# Patient Record
Sex: FEMALE | Race: WHITE | NOT HISPANIC OR LATINO | Employment: UNEMPLOYED | URBAN - METROPOLITAN AREA
[De-identification: names, ages, dates, MRNs, and addresses within clinical notes are randomized per-mention and may not be internally consistent; named-entity substitution may affect disease eponyms.]

---

## 2023-03-06 DIAGNOSIS — R91.1 PULMONARY NODULE: Primary | ICD-10-CM

## 2023-03-06 DIAGNOSIS — F17.210 CIGARETTE NICOTINE DEPENDENCE WITHOUT COMPLICATION: ICD-10-CM

## 2023-03-06 PROBLEM — F41.9 ANXIETY: Status: ACTIVE | Noted: 2023-03-06

## 2023-03-06 PROBLEM — M16.12 OSTEOARTHRITIS OF LEFT HIP: Status: ACTIVE | Noted: 2023-03-06

## 2023-03-06 PROBLEM — K58.0 IRRITABLE BOWEL SYNDROME WITH DIARRHEA: Status: ACTIVE | Noted: 2023-03-06

## 2023-03-06 PROBLEM — I10 HTN (HYPERTENSION): Status: ACTIVE | Noted: 2023-03-06

## 2023-03-06 PROBLEM — G43.909 MIGRAINE HEADACHE: Status: ACTIVE | Noted: 2023-03-06

## 2023-03-06 PROBLEM — F32.A DEPRESSION: Status: ACTIVE | Noted: 2023-03-06

## 2023-03-06 PROBLEM — E78.5 HYPERLIPIDEMIA: Status: ACTIVE | Noted: 2023-03-06

## 2023-03-06 PROBLEM — M16.11 OSTEOARTHRITIS OF RIGHT HIP: Status: ACTIVE | Noted: 2023-03-06

## 2023-03-06 PROBLEM — G62.9 PERIPHERAL NEUROPATHY: Status: ACTIVE | Noted: 2023-03-06

## 2023-03-06 PROBLEM — Z96.642 STATUS POST LEFT HIP REPLACEMENT: Status: ACTIVE | Noted: 2023-03-06

## 2023-03-06 PROBLEM — A60.00 GENITAL HERPES: Status: ACTIVE | Noted: 2023-03-06

## 2023-03-06 PROBLEM — E55.9 MILD VITAMIN D DEFICIENCY: Status: ACTIVE | Noted: 2023-03-06

## 2023-03-06 PROBLEM — M54.50 LOW BACK PAIN: Status: ACTIVE | Noted: 2023-03-06

## 2023-04-02 DIAGNOSIS — I10 PRIMARY HYPERTENSION: Primary | ICD-10-CM

## 2023-04-02 DIAGNOSIS — E55.9 MILD VITAMIN D DEFICIENCY: ICD-10-CM

## 2023-04-03 RX ORDER — RIFAXIMIN 550 MG/1
1 TABLET ORAL
COMMUNITY
Start: 2023-01-06 | End: 2023-10-30 | Stop reason: SDUPTHER

## 2023-04-03 RX ORDER — ERYTHROMYCIN 5 MG/G
OINTMENT OPHTHALMIC
COMMUNITY
Start: 2022-08-03 | End: 2023-11-15 | Stop reason: WASHOUT

## 2023-04-03 RX ORDER — HYOSCYAMINE SULFATE 0.125 MG
TABLET ORAL 4 TIMES DAILY PRN
COMMUNITY
End: 2023-11-15 | Stop reason: WASHOUT

## 2023-04-03 RX ORDER — ACETAMINOPHEN 500 MG
1000 TABLET ORAL EVERY 6 HOURS PRN
COMMUNITY
Start: 2016-03-31

## 2023-04-03 RX ORDER — ATORVASTATIN CALCIUM 80 MG/1
1 TABLET, FILM COATED ORAL DAILY
COMMUNITY
Start: 2017-09-29 | End: 2024-01-16

## 2023-04-03 RX ORDER — PROPRANOLOL HYDROCHLORIDE 120 MG/1
1 CAPSULE, EXTENDED RELEASE ORAL DAILY
COMMUNITY
Start: 2023-02-08 | End: 2023-05-08 | Stop reason: SDUPTHER

## 2023-04-03 RX ORDER — TRAMADOL HYDROCHLORIDE 50 MG/1
1 TABLET ORAL
COMMUNITY
Start: 2023-03-23 | End: 2023-10-02 | Stop reason: ALTCHOICE

## 2023-04-03 RX ORDER — LISINOPRIL AND HYDROCHLOROTHIAZIDE 12.5; 2 MG/1; MG/1
TABLET ORAL
Qty: 30 TABLET | Refills: 0 | Status: SHIPPED | OUTPATIENT
Start: 2023-04-03 | End: 2023-05-07

## 2023-04-03 RX ORDER — IBUPROFEN 800 MG/1
TABLET ORAL
COMMUNITY
Start: 2022-10-27 | End: 2023-10-16 | Stop reason: ALTCHOICE

## 2023-04-03 RX ORDER — NICOTINE 11MG/24HR
PATCH, TRANSDERMAL 24 HOURS TRANSDERMAL
Qty: 30 CAPSULE | Refills: 0 | Status: SHIPPED | OUTPATIENT
Start: 2023-04-03 | End: 2023-05-16

## 2023-04-03 RX ORDER — DICLOFENAC SODIUM 1 MG/ML
SOLUTION/ DROPS OPHTHALMIC
COMMUNITY
Start: 2023-01-23 | End: 2023-11-15 | Stop reason: WASHOUT

## 2023-04-03 RX ORDER — NICOTINE 11MG/24HR
50 PATCH, TRANSDERMAL 24 HOURS TRANSDERMAL DAILY
COMMUNITY
End: 2023-05-06 | Stop reason: SDUPTHER

## 2023-04-03 RX ORDER — RIZATRIPTAN BENZOATE 10 MG/1
TABLET ORAL
COMMUNITY
End: 2023-08-17

## 2023-04-03 RX ORDER — OMEPRAZOLE 40 MG/1
1 CAPSULE, DELAYED RELEASE ORAL DAILY
COMMUNITY
Start: 2023-01-08 | End: 2023-11-15 | Stop reason: WASHOUT

## 2023-04-03 RX ORDER — OXYCODONE AND ACETAMINOPHEN 5; 325 MG/1; MG/1
TABLET ORAL
COMMUNITY
Start: 2023-01-23 | End: 2023-10-02 | Stop reason: ALTCHOICE

## 2023-04-03 RX ORDER — TOPIRAMATE 25 MG/1
25 TABLET ORAL 2 TIMES DAILY
COMMUNITY

## 2023-04-03 RX ORDER — HYDROCODONE BITARTRATE AND ACETAMINOPHEN 5; 325 MG/1; MG/1
TABLET ORAL
COMMUNITY
Start: 2022-06-17 | End: 2023-10-02 | Stop reason: ALTCHOICE

## 2023-04-03 RX ORDER — LISINOPRIL AND HYDROCHLOROTHIAZIDE 12.5; 2 MG/1; MG/1
1 TABLET ORAL DAILY
COMMUNITY
End: 2023-05-07 | Stop reason: SDUPTHER

## 2023-04-03 RX ORDER — VALACYCLOVIR HYDROCHLORIDE 500 MG/1
500 TABLET, FILM COATED ORAL DAILY
COMMUNITY
End: 2023-06-12

## 2023-04-03 RX ORDER — CHLORHEXIDINE GLUCONATE ORAL RINSE 1.2 MG/ML
SOLUTION DENTAL
COMMUNITY
Start: 2023-01-20 | End: 2023-11-15 | Stop reason: WASHOUT

## 2023-04-03 RX ORDER — METRONIDAZOLE 500 MG/1
TABLET ORAL
COMMUNITY
Start: 2022-11-15 | End: 2023-11-15 | Stop reason: WASHOUT

## 2023-04-03 RX ORDER — AMITRIPTYLINE HYDROCHLORIDE 50 MG/1
50 TABLET, FILM COATED ORAL NIGHTLY
COMMUNITY
End: 2023-06-20

## 2023-04-03 RX ORDER — AMLODIPINE BESYLATE 5 MG/1
5 TABLET ORAL DAILY
COMMUNITY
End: 2024-01-18 | Stop reason: ALTCHOICE

## 2023-05-06 DIAGNOSIS — I10 PRIMARY HYPERTENSION: ICD-10-CM

## 2023-05-07 RX ORDER — LISINOPRIL AND HYDROCHLOROTHIAZIDE 12.5; 2 MG/1; MG/1
TABLET ORAL
Qty: 30 TABLET | Refills: 0 | Status: SHIPPED | OUTPATIENT
Start: 2023-05-07 | End: 2023-05-08 | Stop reason: SDUPTHER

## 2023-05-08 DIAGNOSIS — I10 PRIMARY HYPERTENSION: ICD-10-CM

## 2023-05-08 DIAGNOSIS — Z12.31 ENCOUNTER FOR SCREENING MAMMOGRAM FOR MALIGNANT NEOPLASM OF BREAST: Primary | ICD-10-CM

## 2023-05-08 RX ORDER — LISINOPRIL AND HYDROCHLOROTHIAZIDE 12.5; 2 MG/1; MG/1
1 TABLET ORAL DAILY
Qty: 90 TABLET | Refills: 3 | Status: SHIPPED | OUTPATIENT
Start: 2023-05-08 | End: 2024-05-02

## 2023-05-08 RX ORDER — PROPRANOLOL HYDROCHLORIDE 120 MG/1
120 CAPSULE, EXTENDED RELEASE ORAL DAILY
Qty: 90 CAPSULE | Refills: 3 | Status: SHIPPED | OUTPATIENT
Start: 2023-05-08 | End: 2024-04-25

## 2023-05-15 DIAGNOSIS — E55.9 MILD VITAMIN D DEFICIENCY: ICD-10-CM

## 2023-05-16 RX ORDER — ACETAMINOPHEN 500 MG
TABLET ORAL
Qty: 30 CAPSULE | Refills: 0 | Status: SHIPPED | OUTPATIENT
Start: 2023-05-16 | End: 2023-06-28

## 2023-06-10 DIAGNOSIS — A60.04 HERPES SIMPLEX VULVOVAGINITIS: Primary | ICD-10-CM

## 2023-06-12 RX ORDER — VALACYCLOVIR HYDROCHLORIDE 500 MG/1
TABLET, FILM COATED ORAL
Qty: 30 TABLET | Refills: 0 | Status: SHIPPED | OUTPATIENT
Start: 2023-06-12 | End: 2023-07-14

## 2023-06-19 DIAGNOSIS — G43.709 CHRONIC MIGRAINE WITHOUT AURA WITHOUT STATUS MIGRAINOSUS, NOT INTRACTABLE: Primary | ICD-10-CM

## 2023-06-20 RX ORDER — AMITRIPTYLINE HYDROCHLORIDE 50 MG/1
50 TABLET, FILM COATED ORAL NIGHTLY
Qty: 90 TABLET | Refills: 0 | Status: SHIPPED | OUTPATIENT
Start: 2023-06-20 | End: 2023-09-05

## 2023-06-20 RX ORDER — IBUPROFEN 600 MG/1
600 TABLET ORAL EVERY 6 HOURS PRN
COMMUNITY
Start: 2023-05-28 | End: 2023-10-16 | Stop reason: ALTCHOICE

## 2023-06-28 DIAGNOSIS — E55.9 MILD VITAMIN D DEFICIENCY: ICD-10-CM

## 2023-06-28 RX ORDER — ACETAMINOPHEN 500 MG
TABLET ORAL
Qty: 90 CAPSULE | Refills: 3 | Status: SHIPPED | OUTPATIENT
Start: 2023-06-28

## 2023-07-03 ENCOUNTER — TELEPHONE (OUTPATIENT)
Dept: PRIMARY CARE | Facility: CLINIC | Age: 58
End: 2023-07-03
Payer: COMMERCIAL

## 2023-07-03 NOTE — TELEPHONE ENCOUNTER
Results and recommendation left on pts vm and advised to call the office if she has any questions.

## 2023-07-03 NOTE — TELEPHONE ENCOUNTER
----- Message from Lise Jeff PA-C sent at 7/3/2023  3:03 PM EDT -----  Please call her with mammogram results.  It is negative.  Her breast tissue is dense which may hide masses and I would recommend her to have MRI breast which she is self-pay.  It is $250 out-of-pocket.  Let me know if she is interested

## 2023-07-13 DIAGNOSIS — A60.04 HERPES SIMPLEX VULVOVAGINITIS: ICD-10-CM

## 2023-07-14 RX ORDER — VALACYCLOVIR HYDROCHLORIDE 500 MG/1
TABLET, FILM COATED ORAL
Qty: 30 TABLET | Refills: 0 | Status: SHIPPED | OUTPATIENT
Start: 2023-07-14 | End: 2023-08-14

## 2023-08-14 DIAGNOSIS — A60.04 HERPES SIMPLEX VULVOVAGINITIS: ICD-10-CM

## 2023-08-14 RX ORDER — VALACYCLOVIR HYDROCHLORIDE 500 MG/1
TABLET, FILM COATED ORAL
Qty: 30 TABLET | Refills: 0 | Status: SHIPPED | OUTPATIENT
Start: 2023-08-14 | End: 2023-09-07

## 2023-08-16 DIAGNOSIS — G43.701 CHRONIC MIGRAINE WITHOUT AURA WITH STATUS MIGRAINOSUS, NOT INTRACTABLE: Primary | ICD-10-CM

## 2023-08-17 RX ORDER — RIZATRIPTAN BENZOATE 10 MG/1
TABLET ORAL
Qty: 30 TABLET | Refills: 0 | Status: SHIPPED | OUTPATIENT
Start: 2023-08-17 | End: 2023-12-05

## 2023-09-04 DIAGNOSIS — G43.709 CHRONIC MIGRAINE WITHOUT AURA WITHOUT STATUS MIGRAINOSUS, NOT INTRACTABLE: ICD-10-CM

## 2023-09-05 RX ORDER — AMITRIPTYLINE HYDROCHLORIDE 50 MG/1
50 TABLET, FILM COATED ORAL NIGHTLY
Qty: 90 TABLET | Refills: 1 | Status: SHIPPED | OUTPATIENT
Start: 2023-09-05 | End: 2024-03-05

## 2023-09-06 DIAGNOSIS — A60.04 HERPES SIMPLEX VULVOVAGINITIS: ICD-10-CM

## 2023-09-07 ENCOUNTER — HOSPITAL ENCOUNTER (OUTPATIENT)
Dept: DATA CONVERSION | Facility: HOSPITAL | Age: 58
End: 2023-09-07
Attending: ORTHOPAEDIC SURGERY | Admitting: ORTHOPAEDIC SURGERY
Payer: COMMERCIAL

## 2023-09-07 DIAGNOSIS — M18.0 BILATERAL PRIMARY OSTEOARTHRITIS OF FIRST CARPOMETACARPAL JOINTS: ICD-10-CM

## 2023-09-07 DIAGNOSIS — M19.049 PRIMARY OSTEOARTHRITIS, UNSPECIFIED HAND: ICD-10-CM

## 2023-09-07 RX ORDER — VALACYCLOVIR HYDROCHLORIDE 500 MG/1
TABLET, FILM COATED ORAL
Qty: 30 TABLET | Refills: 11 | Status: SHIPPED | OUTPATIENT
Start: 2023-09-07

## 2023-09-21 PROBLEM — L91.8 CUTANEOUS SKIN TAGS: Status: ACTIVE | Noted: 2023-09-21
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
## 2023-09-21 PROBLEM — E11.42 DIABETIC PERIPHERAL NEUROPATHY (MULTI): Status: ACTIVE | Noted: 2023-09-21

## 2023-09-21 PROBLEM — L81.1 MELASMA: Status: ACTIVE | Noted: 2023-09-21

## 2023-09-21 PROBLEM — M77.8 LEFT WRIST TENDINITIS: Status: ACTIVE | Noted: 2023-09-21

## 2023-09-21 PROBLEM — R09.89 VASOMOTOR PHENOMENON: Status: ACTIVE | Noted: 2023-09-21

## 2023-09-21 PROBLEM — M54.41 CHRONIC RIGHT-SIDED LOW BACK PAIN WITH RIGHT-SIDED SCIATICA: Status: ACTIVE | Noted: 2023-09-21

## 2023-09-21 PROBLEM — G89.29 CHRONIC RIGHT-SIDED LOW BACK PAIN WITH RIGHT-SIDED SCIATICA: Status: ACTIVE | Noted: 2023-09-21

## 2023-09-21 PROBLEM — M19.011 ARTHRITIS OF SHOULDER REGION, RIGHT: Status: ACTIVE | Noted: 2023-09-21

## 2023-09-21 PROBLEM — M51.36 DDD (DEGENERATIVE DISC DISEASE), LUMBAR: Status: ACTIVE | Noted: 2023-09-21

## 2023-09-21 PROBLEM — D22.60 MELANOCYTIC NEVI OF UNSPECIFIED UPPER LIMB, INCLUDING SHOULDER: Status: ACTIVE | Noted: 2021-09-29

## 2023-09-21 PROBLEM — D22.70 MELANOCYTIC NEVI OF UNSPECIFIED LOWER LIMB, INCLUDING HIP: Status: ACTIVE | Noted: 2021-09-29

## 2023-09-21 PROBLEM — M16.9 DEGENERATIVE JOINT DISEASE (DJD) OF HIP: Status: ACTIVE | Noted: 2023-09-21

## 2023-09-21 PROBLEM — R20.2 PARESTHESIA: Status: ACTIVE | Noted: 2023-09-21

## 2023-09-21 PROBLEM — H25.10 NUCLEAR SENILE CATARACT: Status: ACTIVE | Noted: 2023-09-21

## 2023-09-21 PROBLEM — M25.859 FEMORAL ACETABULAR IMPINGEMENT: Status: ACTIVE | Noted: 2023-09-21

## 2023-09-21 PROBLEM — I73.9 PAD (PERIPHERAL ARTERY DISEASE) (CMS-HCC): Status: ACTIVE | Noted: 2023-09-21

## 2023-09-21 PROBLEM — S43.431A TEAR OF RIGHT GLENOID LABRUM: Status: ACTIVE | Noted: 2023-09-21

## 2023-09-21 PROBLEM — L81.1 CHLOASMA: Status: ACTIVE | Noted: 2021-09-29

## 2023-09-21 PROBLEM — M54.31 RIGHT SIDED SCIATICA: Status: ACTIVE | Noted: 2023-09-21

## 2023-09-21 PROBLEM — M19.049 ARTHRITIS OF CARPOMETACARPAL JOINT: Status: ACTIVE | Noted: 2023-09-21

## 2023-09-21 PROBLEM — L81.4 OTHER MELANIN HYPERPIGMENTATION: Status: ACTIVE | Noted: 2021-09-29

## 2023-09-21 PROBLEM — I73.00 RAYNAUD'S DISEASE WITHOUT GANGRENE: Status: ACTIVE | Noted: 2023-09-21

## 2023-09-21 PROBLEM — K21.9 GERD (GASTROESOPHAGEAL REFLUX DISEASE): Status: ACTIVE | Noted: 2023-09-21

## 2023-09-21 PROBLEM — H04.129 TEAR FILM INSUFFICIENCY: Status: ACTIVE | Noted: 2023-09-21

## 2023-09-21 PROBLEM — E83.52 HYPERCALCEMIA: Status: ACTIVE | Noted: 2023-09-21

## 2023-09-21 PROBLEM — M25.50 ARTHRALGIA: Status: ACTIVE | Noted: 2023-09-21

## 2023-09-21 PROBLEM — M25.50 POLYARTHRALGIA: Status: ACTIVE | Noted: 2023-09-21

## 2023-09-21 PROBLEM — L91.8 OTHER HYPERTROPHIC DISORDERS OF THE SKIN: Status: ACTIVE | Noted: 2021-09-29

## 2023-09-21 PROBLEM — R92.8 ABNORMAL MAMMOGRAM: Status: ACTIVE | Noted: 2023-09-21

## 2023-09-21 PROBLEM — M67.911 DYSFUNCTION OF RIGHT ROTATOR CUFF: Status: ACTIVE | Noted: 2023-09-21

## 2023-09-21 PROBLEM — D22.5 MELANOCYTIC NEVI OF TRUNK: Status: ACTIVE | Noted: 2021-09-29

## 2023-09-21 PROBLEM — M62.838 MUSCLE SPASMS OF BOTH LOWER EXTREMITIES: Status: ACTIVE | Noted: 2023-09-21

## 2023-09-21 PROBLEM — M70.61 TROCHANTERIC BURSITIS OF RIGHT HIP: Status: ACTIVE | Noted: 2023-09-21

## 2023-09-21 PROBLEM — R79.89 ABNORMAL TSH: Status: ACTIVE | Noted: 2023-09-21

## 2023-09-21 PROBLEM — M46.1 SACROILIITIS (CMS-HCC): Status: ACTIVE | Noted: 2023-09-21

## 2023-09-21 PROBLEM — H52.31 ANISOMETROPIA: Status: ACTIVE | Noted: 2023-09-21

## 2023-09-21 PROBLEM — H40.003 PREGLAUCOMA, UNSPECIFIED, BILATERAL: Status: ACTIVE | Noted: 2023-09-21

## 2023-09-21 PROBLEM — R26.2 DIFFICULTY WALKING: Status: ACTIVE | Noted: 2023-09-21

## 2023-09-21 PROBLEM — H61.23 BILATERAL IMPACTED CERUMEN: Status: ACTIVE | Noted: 2023-09-21

## 2023-09-21 PROBLEM — D18.01 HEMANGIOMA OF SKIN AND SUBCUTANEOUS TISSUE: Status: ACTIVE | Noted: 2021-09-29

## 2023-09-21 PROBLEM — M54.16 LUMBAR RADICULITIS: Status: ACTIVE | Noted: 2023-09-21

## 2023-09-21 PROBLEM — B35.1 ONYCHOMYCOSIS OF TOENAIL: Status: ACTIVE | Noted: 2023-09-21

## 2023-09-21 PROBLEM — M20.5X1 HALLUX LIMITUS, RIGHT: Status: ACTIVE | Noted: 2023-09-21

## 2023-09-21 PROBLEM — N93.9 ABNORMAL VAGINAL BLEEDING: Status: ACTIVE | Noted: 2023-09-21

## 2023-09-21 PROBLEM — M51.369 DDD (DEGENERATIVE DISC DISEASE), LUMBAR: Status: ACTIVE | Noted: 2023-09-21

## 2023-09-21 PROBLEM — G60.9 IDIOPATHIC PERIPHERAL NEUROPATHY: Status: ACTIVE | Noted: 2023-09-21

## 2023-09-21 PROBLEM — L82.0 INFLAMED SEBORRHEIC KERATOSIS: Status: ACTIVE | Noted: 2021-09-29

## 2023-09-21 PROBLEM — H52.7 DISORDER OF REFRACTION: Status: ACTIVE | Noted: 2023-09-21

## 2023-09-21 PROBLEM — M16.0 OSTEOARTHRITIS, HIP, BILATERAL: Status: ACTIVE | Noted: 2023-09-21

## 2023-09-21 PROBLEM — L21.9 SEBORRHEIC DERMATITIS: Status: ACTIVE | Noted: 2023-09-21

## 2023-09-21 PROBLEM — M75.21 BICEPS TENDINITIS OF RIGHT SHOULDER: Status: ACTIVE | Noted: 2023-09-21

## 2023-09-21 PROBLEM — S29.019A STRAIN OF THORACIC REGION: Status: ACTIVE | Noted: 2023-09-21

## 2023-09-21 RX ORDER — TRETINOIN 0.5 MG/G
CREAM TOPICAL
COMMUNITY
Start: 2021-04-27 | End: 2023-11-15 | Stop reason: WASHOUT

## 2023-09-21 RX ORDER — TRETINOIN 1 MG/G
CREAM TOPICAL
COMMUNITY
Start: 2021-06-30 | End: 2023-11-15 | Stop reason: WASHOUT

## 2023-09-21 RX ORDER — AZELAIC ACID 0.15 G/G
1 GEL TOPICAL
COMMUNITY
Start: 2020-12-16 | End: 2023-11-15 | Stop reason: WASHOUT

## 2023-09-22 DIAGNOSIS — F32.5 MAJOR DEPRESSIVE DISORDER WITH SINGLE EPISODE, IN FULL REMISSION (CMS-HCC): Primary | ICD-10-CM

## 2023-09-22 RX ORDER — DOCUSATE SODIUM 100 MG/1
100 CAPSULE, LIQUID FILLED ORAL 2 TIMES DAILY PRN
COMMUNITY
Start: 2023-09-07 | End: 2023-11-15 | Stop reason: WASHOUT

## 2023-09-25 DIAGNOSIS — L65.9 HAIR LOSS: Primary | ICD-10-CM

## 2023-09-25 RX ORDER — CITALOPRAM 40 MG/1
40 TABLET, FILM COATED ORAL DAILY
Qty: 30 TABLET | Refills: 0 | Status: SHIPPED | OUTPATIENT
Start: 2023-09-25 | End: 2023-10-27

## 2023-09-29 VITALS — WEIGHT: 145.94 LBS | BODY MASS INDEX: 26.86 KG/M2 | HEIGHT: 62 IN

## 2023-09-30 NOTE — H&P
History of Present Illness:   Pregnant/Lactating:  ·  Are You Pregnant unable to answer   ·  Order Pregnancy Test order urine test   ·  Are You Currently Breastfeeding unable to answer     History Present Illness:  Reason for surgery: R CMC OA   HPI:    Pt with longstanding R CMC OA refractory to conservative management.     Allergies:        Allergies:  ·  predniSONE : Facial Swelling  ·  gabapentin : Psychosis  ·  Cymbalta : Psychosis, Other  ·  indomethacin : Unknown, Other  ·  sulfa  drugs: Rash    Home Medication Review:   Home Medications Reviewed: yes     Impression/Procedure:   ·  Impression and Planned Procedure: R CMC OA; plan for R CMC arthroplasty       ERAS (Enhanced Recovery After Surgery):  ·  ERAS Patient: no       Physical Exam Narrative:  ·  Physical Exam:    - Constitutional: No acute distress, cooperative  - Eyes: EOM grossly intact  - Head/Neck: Trachea midline  - Respiratory/Thorax: NWOB  - Cardiovascular: RRR on peripheral palpation  - Gastrointestinal: Nondistended  - Psychological: Appropriate mood/behavior  - Skin: Warm and dry. Additional findings in musculoskeletal evaluation  - Musculoskeletal: moves extremities     Physical Exam by System:    Respiratory/Thorax: - Respiratory/Thorax: NWOB   Cardiovascular: - Cardiovascular: RRR on peripheral  palpation     Consent:   COVID-19 Consent:  ·  COVID-19 Risk Consent Surgeon has reviewed key risks related to the risk of sharon COVID-19 and if they contract COVID-19 what the risks are.     Attestation:   Note Completion:  I am a:  Resident/Fellow   Attending Attestation I saw and evaluated the patient.  I personally obtained the key and critical portions of the history and physical exam or was physically present for key and  critical portions performed by the resident/fellow. I reviewed the resident/fellow?s documentation and discussed the patient with the resident/fellow.  I agree with the resident/fellow?s medical decision making as  documented in the note.     I personally evaluated the patient on 07-Sep-2023         Electronic Signatures:  Raghav Elizabeth (Resident))  (Signed 07-Sep-2023 07:58)   Authored: History of Present Illness, Allergies, Home  Medication Review, Impression/Procedure, ERAS, Physical Exam, Consent, Note Completion  Jurgen Munguia)  (Signed 07-Sep-2023 16:13)   Authored: Note Completion   Co-Signer: History of Present Illness, Allergies, Home Medication Review, Impression/Procedure, ERAS, Physical Exam, Consent, Note Completion      Last Updated: 07-Sep-2023 16:13 by Jurgen Munguia)

## 2023-10-01 NOTE — OP NOTE
PROCEDURE DETAILS    Preoperative Diagnosis:  1.  Right thumb carpometacarpal joint osteoarthritis  2.  Right scaphotrapeziotrapezoidal joint osteoarthritis   3.  Left thumb carpometacarpal joint osteoarthritis    Postoperative Diagnosis:  Same    Surgeon: Jurgen Munguia MD  Resident/Fellow/Other Assistant: Raghav Elizabeth MD PGY2    Procedure:  1. Right thumb carpometacarpal joint arthroplasty  2. Right flexor carpi radialis tendon harvest and transfer  3. Right partial trapezoidectomy  4. Left thumb carpometacarpal joint corticosteroid injection  Anesthesia: General LMA; Axillary nerve block  Estimated Blood Loss: 1cc  Blood Replaced: None  Findings: R thumb CMC and STT osteoarthritis.   Specimens(s) Collected: no,     Complications: None immediate  Drains and/or Catheters: None  Implants: None  Tourniquet Times: 56 minutes at 250mmHg  Patient Returned To/Condition: PACU in good condition                                Attestation:   Note Completion:  Attending Attestation I was present for the entire procedure    I am a: Resident/Fellow         Electronic Signatures:  Raghav Elizabeth (Resident))  (Signed 07-Sep-2023 14:31)   Authored: Post-Operative Note, Chart Review, Note Completion  Jurgen Munguia)  (Signed 07-Sep-2023 16:14)   Authored: Note Completion   Co-Signer: Post-Operative Note, Chart Review, Note Completion      Last Updated: 07-Sep-2023 16:14 by Jurgen Munguia)

## 2023-10-02 ENCOUNTER — TELEPHONE (OUTPATIENT)
Dept: PRIMARY CARE | Facility: CLINIC | Age: 58
End: 2023-10-02

## 2023-10-02 DIAGNOSIS — M19.049 CMC ARTHRITIS: Primary | ICD-10-CM

## 2023-10-02 DIAGNOSIS — N30.00 ACUTE CYSTITIS WITHOUT HEMATURIA: Primary | ICD-10-CM

## 2023-10-02 RX ORDER — CIPROFLOXACIN 500 MG/1
500 TABLET ORAL 2 TIMES DAILY
Qty: 10 TABLET | Refills: 0 | Status: SHIPPED | OUTPATIENT
Start: 2023-10-02 | End: 2023-10-07

## 2023-10-02 RX ORDER — OXYCODONE AND ACETAMINOPHEN 5; 325 MG/1; MG/1
1 TABLET ORAL EVERY 6 HOURS PRN
Qty: 28 TABLET | Refills: 0 | Status: SHIPPED | OUTPATIENT
Start: 2023-10-02 | End: 2023-10-09 | Stop reason: SDUPTHER

## 2023-10-09 RX ORDER — OXYCODONE AND ACETAMINOPHEN 5; 325 MG/1; MG/1
1 TABLET ORAL EVERY 6 HOURS PRN
Qty: 28 TABLET | Refills: 0 | Status: SHIPPED | OUTPATIENT
Start: 2023-10-09 | End: 2023-10-16

## 2023-10-16 ENCOUNTER — OFFICE VISIT (OUTPATIENT)
Dept: ORTHOPEDIC SURGERY | Facility: CLINIC | Age: 58
End: 2023-10-16
Payer: COMMERCIAL

## 2023-10-16 DIAGNOSIS — M19.049 CMC ARTHRITIS: Primary | ICD-10-CM

## 2023-10-16 PROCEDURE — L3924 HFO WITHOUT JOINTS PRE OTS: HCPCS | Performed by: ORTHOPAEDIC SURGERY

## 2023-10-16 RX ORDER — OXYCODONE AND ACETAMINOPHEN 5; 325 MG/1; MG/1
1 TABLET ORAL EVERY 6 HOURS PRN
Qty: 28 TABLET | Refills: 0 | Status: SHIPPED | OUTPATIENT
Start: 2023-10-16 | End: 2023-10-23

## 2023-10-16 RX ORDER — IBUPROFEN 600 MG/1
600 TABLET ORAL 3 TIMES DAILY
Qty: 90 TABLET | Refills: 0 | Status: SHIPPED | OUTPATIENT
Start: 2023-10-16 | End: 2023-11-15

## 2023-10-16 NOTE — PROGRESS NOTES
Date of surgery: 09/07/23          Surgery(s) performed: Right thumb CMC joint arthroplasty     Patient presents today for her second post-operative visit, performed on 09/07/23.     She bumped her hand getting out of shower over the weekend been more painful since.       Exam findings: Well healed surgical incision. Minimal swelling. Tenderness to palpation over the thumb CMC joint. Hypersensitivity over thumb metacarpal    Impression: Right thumb CMC joint arthritis    Plan: She was given a comfort cool splint to transition into. We provided her with a refill for more pain medication and ibuprofen. She was given a referral to therapy as well. She will return to see us for a repeat clinincal exam in 6 weeks.      Patient was prescribed a Comfort Cool for CMC arthritis. The patient has weakness, instability and/or deformity of their right thumb which requires stabilization from this orthosis to improve their function.      Verbal and written instructions for the use, wear schedule, cleaning and application of this item were given.  Patient was instructed that should the brace result in increased pain, decreased sensation, increased swelling, or an overall worsening of their medical condition, to please contact our office immediately.     Orthotic management and training was provided for skin care, modifications due to healing tissues, edema changes, interruption in skin integrity, and safety precautions with the orthosis.        Followup in 6 weeks.    Jurgen Munguia MD          [facility] [employer]     Department of Orthopaedic Surgery     Chief of Hand and Upper Extremity Surgery     [facility]      Scribe Attestation  By signing my name below, Shanta COBURN , Scribe   attest that this documentation has been prepared under the direction and in the presence of Jurgen Munguia MD.

## 2023-10-20 ENCOUNTER — EVALUATION (OUTPATIENT)
Dept: OCCUPATIONAL THERAPY | Facility: CLINIC | Age: 58
End: 2023-10-20
Payer: COMMERCIAL

## 2023-10-20 DIAGNOSIS — M25.531 WRIST PAIN, ACUTE, RIGHT: Primary | ICD-10-CM

## 2023-10-20 PROCEDURE — 97112 NEUROMUSCULAR REEDUCATION: CPT | Mod: GO | Performed by: OCCUPATIONAL THERAPIST

## 2023-10-20 PROCEDURE — 97165 OT EVAL LOW COMPLEX 30 MIN: CPT | Mod: GO | Performed by: OCCUPATIONAL THERAPIST

## 2023-10-20 NOTE — LETTER
October 20, 2023     Patient: Nereida Conway   YOB: 1965   Date of Visit: 10/20/2023       To Whom It May Concern:    It is my medical opinion that Nereida Conway {Work release (duty restriction):67518}.    If you have any questions or concerns, please don't hesitate to call.         Sincerely,        Jovanna Epstein OT    CC: No Recipients

## 2023-10-20 NOTE — PROGRESS NOTES
"  Occupational Therapy  Occupational Therapy Orthopedic Evaluation    Patient Name: Nereida Conway  MRN: [id number]  Today's Date: 10/20/2023  Time Calculation  Start Time: 0935  Stop Time: 1015  Time Calculation (min): 40 min    Insurance:  Visit number: Evaluation  Authorization info: required  Insurance Type: [employer]  Authorization certification  -  Start: 10/20/23  End: 12/1/23    Current Problem  1. Wrist pain, acute, right          Referred by: Dr. Munguia  Referred for: R thumb CMC LRTI  DOS: 9-7-23    Fall risk:  Precautions:     Medical History Form: Reviewed (scanned into chart)    Subjective:   Subjective        Chief Complaint: pain and decreased ROM of R wrist    Hand Dominance: Right    Pain:   3-6/10   Location: R radial wrist snuff box  Description: aching, dull, sharp, radiating  Aggravating Factors:  Active use  Relieving Factors:  rest, inactivity, immobilization    Previous Interventions/Treatments: None    Prior Level of Function (PLOF)  Patient previously independent with all ADLs/IADLs    ADL/IADL impairments  Bathing, Dressing, Hygiene/Grooming, Hair care, Sleep, Home mgt, Meal prep, and Work, and gardening    Patients Living Environment: Reviewed and no concern  Lives with: boyfriend  Primary Language: English  Patient's Goal(s) for Therapy: \"gain muscle back in my arm and get my thumb strength back and pull up my pants without thumb pain\"    Red Flags: Do you have any of the following? No  Fever/chills, unexplained weight changes, dizziness/fainting, unexplained change in bowel or bladder functions, unexplained malaise or muscle weakness, night pain/sweats, numbness or tingling      Objective   RIGHT HAND AROM (Degrees)   MCP PIP DIP DPC   Thumb 0/35      AROM  0/20    AROM    Thumb RABD 35         AROM      Thumb PABD 35         AROM        Outcome Measures:         QUICK DASH: 65.91%      Treatments:  Exercise Sets/Reps Comments   Thumb AROM all POM [id number] 5x/ day   Thumb opposition to SF P1 " 3x20 rps 5x/ day                                       Treatment Today  Neuro re-ed:  - AROM tendon gliding of all digits to re-ed proprioception and promote desensitization to dorsal thumb and wrist surgical site x 10 min    EDUCATION: Home exercise program, plan of care, activity modifications, joint protection, pain management, and injury pathology       Assessment:   Pt is a 58-year-old female who underwent surgical intervention on her right wrist and thumb on 9/7/2023 and is presenting today for evaluation with complaints of decreased strength, decreased ROM, increased pain.  As a result of these impairments pt is having difficulty with upper body/lower body bathing and dressing tasks, difficulty with hair care tasks, is experiencing loss of sleep, is having difficulty with home management and meal prep tasks, and is unable to return to work.  Without skilled intervention patient is at risk for further loss of ROM, loss of strength, reduced ADL/IADL function, and is at risk for requiring caregiver assistance for self care completion.  Patient to benefit from skilled services to address the impairments found in order to return to independent prior level of function.      Plan:     Planned Interventions include: therapeutic exercise, self-care home management, manual therapy, therapeutic activities, , neuromuscular coordination, vasopneumatic, dry needling, and orthosis fabrication.  Frequency: 1 x Week  Duration: 6 Weeks    Goals: Set and discussed today    Active       OT Goals       OT Goal 1       Start:  10/20/23    Expected End:  12/01/23       Patient will demonstrate age normative pinch strength in order to perform baking tasks without compensatory techniques for task completion         OT Goal 2       Start:  10/20/23    Expected End:  11/10/23       Patient will correctly return demonstration of entire HEP to ensure proper carryover to home         OT Goal 3       Start:  10/20/23    Expected End:   12/01/23       Patient will be able to perform all hair care/hygiene and grooming tasks without right thumb pain             Plan of care was developed with input and agreement by the patient      Jvoanna Epstein, OT

## 2023-10-20 NOTE — LETTER
October 20, 2023     Patient: Nereida Conway   YOB: 1965   Date of Visit: 10/20/2023       To Whom it May Concern:    Nereida Conway was seen in my clinic on 10/20/2023. She {Return to school/sport:70011}.    If you have any questions or concerns, please don't hesitate to call.         Sincerely,          Jovanna Epstein OT        CC: No Recipients

## 2023-10-23 DIAGNOSIS — M19.049 CMC ARTHRITIS: Primary | ICD-10-CM

## 2023-10-23 RX ORDER — TRAMADOL HYDROCHLORIDE 50 MG/1
50 TABLET ORAL EVERY 6 HOURS PRN
Qty: 50 TABLET | Refills: 0 | Status: SHIPPED | OUTPATIENT
Start: 2023-10-23 | End: 2023-11-02 | Stop reason: SDUPTHER

## 2023-10-26 DIAGNOSIS — F32.5 MAJOR DEPRESSIVE DISORDER WITH SINGLE EPISODE, IN FULL REMISSION (CMS-HCC): ICD-10-CM

## 2023-10-27 RX ORDER — CITALOPRAM 40 MG/1
40 TABLET, FILM COATED ORAL DAILY
Qty: 30 TABLET | Refills: 0 | Status: SHIPPED | OUTPATIENT
Start: 2023-10-27 | End: 2023-11-30

## 2023-10-30 DIAGNOSIS — K58.0 IRRITABLE BOWEL SYNDROME WITH DIARRHEA: Primary | ICD-10-CM

## 2023-10-30 RX ORDER — HYDROCODONE BITARTRATE AND ACETAMINOPHEN 5; 325 MG/1; MG/1
TABLET ORAL
COMMUNITY
Start: 2022-06-17 | End: 2023-11-15 | Stop reason: WASHOUT

## 2023-10-30 RX ORDER — RIFAXIMIN 550 MG/1
550 TABLET ORAL 3 TIMES DAILY
Qty: 42 TABLET | Refills: 0 | Status: SHIPPED | OUTPATIENT
Start: 2023-10-30 | End: 2023-11-15

## 2023-11-01 ENCOUNTER — TREATMENT (OUTPATIENT)
Dept: OCCUPATIONAL THERAPY | Facility: CLINIC | Age: 58
End: 2023-11-01
Payer: COMMERCIAL

## 2023-11-01 DIAGNOSIS — M25.531 WRIST PAIN, ACUTE, RIGHT: Primary | ICD-10-CM

## 2023-11-01 PROCEDURE — 97110 THERAPEUTIC EXERCISES: CPT | Mod: GO | Performed by: OCCUPATIONAL THERAPIST

## 2023-11-01 PROCEDURE — 97112 NEUROMUSCULAR REEDUCATION: CPT | Mod: GO | Performed by: OCCUPATIONAL THERAPIST

## 2023-11-01 NOTE — PROGRESS NOTES
Occupational Therapy  Occupational Therapy Orthopedic Evaluation    Patient Name: Nereida Conway  MRN: [id number]  Today's Date: 11/1/2023  Time Calculation  Start Time: 1230  Stop Time: 1313  Time Calculation (min): 43 min    Insurance:  Visit number: 1 of  Authorization info: required  Insurance Type: [employer]  Authorization certification  -  Start: 10/20/23  End: 12/1/23    Current Problem  1. Wrist pain, acute, right          Referred by: Dr. Munguia  Referred for: R thumb CMC LRTI  DOS: 9-7-23    Fall risk: none  Precautions: WBAT     Hand Dominance: Right      Subjective:  Ppt arrives reporting constant soreness of her thumb and difficulty tolerating texture however it is not causing her to lose sleep    Pain:   3-5/10   Location: R radial wrist snuff box  Description: aching, dull, sharp, radiating    Objective   RIGHT HAND AROM (Degrees)   MCP PIP DIP DPC   Thumb 0/35      AROM  0/20    AROM    Thumb RABD 35         AROM      Thumb PABD 35         AROM        Outcome Measures:         QUICK DASH: 65.91%       HEP:  Exercise Sets/Reps Comments   Thumb AROM all POM [id number] 5x/ day   Thumb opposition to SF P1 3x20 rps 5x/ day                                       Treatment Today  Therapeutic ex:  - graded clothespins to increase pinch strength x 8 min  - translating marbles palm to finger to increase ROM x 5 min  - crumble and spread paper to increase thumb coordination x 5 min  - red flexbar sup/pron to increase strength at wrist level 3x10 each  - reviewed HEP to ensure proper carryover x 5 min  Neuro re-ed:  - AROM tendon gliding of all digits to re-ed proprioception and promote desensitization to dorsal thumb and wrist surgical site x 12 min      Assessment:   Pt is 8wks post op and progressing very well overall with ROM.  She does report fatigue and soreness with gentle strength although this is expected with beginning strengthening.  Excellent understanding of HEP.  Progressing very well.      Plan:    Continue with ROM, STM, and gentle strength    Goals: Set and discussed today    Active       OT Goals       OT Goal 1       Start:  10/20/23    Expected End:  12/01/23       Patient will demonstrate age normative pinch strength in order to perform baking tasks without compensatory techniques for task completion         OT Goal 2       Start:  10/20/23    Expected End:  11/10/23       Patient will correctly return demonstration of entire HEP to ensure proper carryover to home         OT Goal 3       Start:  10/20/23    Expected End:  12/01/23       Patient will be able to perform all hair care/hygiene and grooming tasks without right thumb pain             Plan of care was developed with input and agreement by the patient      Jovanna Epstein, OT

## 2023-11-02 DIAGNOSIS — M19.049 CMC ARTHRITIS: ICD-10-CM

## 2023-11-02 RX ORDER — TRAMADOL HYDROCHLORIDE 50 MG/1
50 TABLET ORAL EVERY 6 HOURS PRN
Qty: 50 TABLET | Refills: 0 | Status: SHIPPED | OUTPATIENT
Start: 2023-11-02 | End: 2023-12-18 | Stop reason: SDUPTHER

## 2023-11-08 ENCOUNTER — TREATMENT (OUTPATIENT)
Dept: OCCUPATIONAL THERAPY | Facility: CLINIC | Age: 58
End: 2023-11-08
Payer: COMMERCIAL

## 2023-11-08 DIAGNOSIS — M25.531 WRIST PAIN, ACUTE, RIGHT: Primary | ICD-10-CM

## 2023-11-08 PROCEDURE — 97110 THERAPEUTIC EXERCISES: CPT | Mod: GO | Performed by: OCCUPATIONAL THERAPIST

## 2023-11-08 PROCEDURE — 97112 NEUROMUSCULAR REEDUCATION: CPT | Mod: GO | Performed by: OCCUPATIONAL THERAPIST

## 2023-11-08 NOTE — PROGRESS NOTES
Occupational Therapy  Occupational Therapy Orthopedic Evaluation    Patient Name: Nereida Conway  MRN: [id number]  Today's Date: 11/8/2023  Time Calculation  Start Time: 0917  Stop Time: 0959  Time Calculation (min): 42 min    Insurance:  Visit number: 2 of  Authorization info: required  Insurance Type: May  Authorization certification  -  Start: 10/20/23  End: 12/1/23    Current Problem  1. Wrist pain, acute, right          Referred by: Dr. Munguia  Referred for: R thumb CMC LRTI  DOS: 9-7-23    Fall risk: none  Precautions: WBAT     Hand Dominance: Right      Subjective:  Ppt arrives reporting constant soreness of her thumb and difficulty tolerating texture however it is not causing her to lose sleep    Pain:   3-5/10   Location: R radial wrist snuff box  Description: aching, dull    Objective   RIGHT HAND AROM (Degrees)   MCP PIP DIP DPC   Thumb 0/35      AROM  0/20    AROM    Thumb RABD 35         AROM      Thumb PABD 35         AROM          HAND STRENGTH (Lbs)   R L   Dynamometer      Lateral Pinch 7 15   3jaw Pinch 8 14      Outcome Measures:         QUICK DASH: 65.91%       HEP:  Exercise Sets/Reps Comments   Thumb AROM all POM [id number] 5x/ day   Thumb opposition to SF P1 3x20 rps 5x/ day                                       Treatment Today  Therapeutic ex:  - graded clothespins to increase pinch strength x 8 min  - translating marbles palm to finger to increase ROM x 5 min  - crumble and spread paper to increase thumb coordination x 5 min  - reviewed HEP to ensure proper carryover x 5 min  - wrist ABCs with 2lb DB to increase wrist strength x 5min    Neuro re-ed:  - AROM tendon gliding of all digits to re-ed proprioception and promote desensitization to dorsal thumb and wrist surgical site x 12 min      Assessment:   Pt is 9 wks post op and progressing very well overall with ROM.  She reports fatigue and soreness with gentle strength although this is expected still.  Pinch strength recorded with good  baseline starting point.  Progressing very well.      Plan:   Continue with ROM, STM, and gentle strength    Goals: Set and discussed today    Active       OT Goals       OT Goal 1       Start:  10/20/23    Expected End:  12/01/23       Patient will demonstrate age normative pinch strength in order to perform baking tasks without compensatory techniques for task completion         OT Goal 2       Start:  10/20/23    Expected End:  11/10/23       Patient will correctly return demonstration of entire HEP to ensure proper carryover to home         OT Goal 3       Start:  10/20/23    Expected End:  12/01/23       Patient will be able to perform all hair care/hygiene and grooming tasks without right thumb pain             Plan of care was developed with input and agreement by the patient      Jovanna Epstein OT

## 2023-11-13 DIAGNOSIS — M19.049 CMC ARTHRITIS: Primary | ICD-10-CM

## 2023-11-13 RX ORDER — TRAMADOL HYDROCHLORIDE 50 MG/1
50 TABLET ORAL 3 TIMES DAILY PRN
Qty: 15 TABLET | Refills: 0 | Status: SHIPPED | OUTPATIENT
Start: 2023-11-13 | End: 2023-11-20 | Stop reason: SDUPTHER

## 2023-11-14 ENCOUNTER — TREATMENT (OUTPATIENT)
Dept: OCCUPATIONAL THERAPY | Facility: CLINIC | Age: 58
End: 2023-11-14
Payer: COMMERCIAL

## 2023-11-14 DIAGNOSIS — M25.531 WRIST PAIN, ACUTE, RIGHT: Primary | ICD-10-CM

## 2023-11-14 PROCEDURE — 97110 THERAPEUTIC EXERCISES: CPT | Mod: GO | Performed by: OCCUPATIONAL THERAPIST

## 2023-11-14 PROCEDURE — 97112 NEUROMUSCULAR REEDUCATION: CPT | Mod: GO | Performed by: OCCUPATIONAL THERAPIST

## 2023-11-14 NOTE — PROGRESS NOTES
Occupational Therapy Treatment      Patient Name: Nereida Conway  MRN: [id number]  Today's Date: 11/14/2023  Time Calculation  Start Time: 1045  Stop Time: 1127  Time Calculation (min): 42 min    Insurance:  Visit number: 3 of  Authorization info: required  Insurance Type: [employer]  Authorization certification  -  Start: 10/20/23  End: 12/1/23    Current Problem  1. Wrist pain, acute, right          Referred by: Dr. Munguia  Referred for: R thumb CMC LRTI  DOS: 9-7-23    Fall risk: none  Precautions: WBAT     Hand Dominance: Right      Subjective:  Ppt arrives reporting constant soreness of her thumb and difficulty tolerating texture however it is not causing her to lose sleep    Pain:   3-5/10   Location: R radial wrist snuff box  Description: aching, dull    Objective   RIGHT HAND AROM (Degrees)   MCP PIP DIP DPC   Thumb 0/40      AROM  0/45    AROM    Thumb RABD 35         AROM      Thumb PABD 35         AROM          HAND STRENGTH (Lbs)   R L   Dynamometer      Lateral Pinch 7 15   3jaw Pinch 8 14      Outcome Measures:         QUICK DASH: 65.91%       HEP:  Exercise Sets/Reps Comments   Thumb AROM all POM [id number] 5x/ day   Thumb opposition to SF P1 3x20 rps 5x/ day                                       Treatment Today  Therapeutic ex:  - graded clothespins to increase pinch strength x 8 min  - translating marbles palm to finger to increase ROM x 5 min  - crumble and spread paper to increase thumb coordination x 5 min  - reviewed HEP to ensure proper carryover x 5 min  - wrist ABCs with 2lb DB to increase wrist strength x 5min    Neuro re-ed:  - AROM tendon gliding of all digits to re-ed proprioception and promote desensitization to dorsal thumb and wrist surgical site x 12 min      Assessment:   Pt is progressing very well overall with ROM.  ?she is still quite weak at the distal wrist and forearm level as she demonstrates shaking with low grade resisted flexbar.   She is appropriately wearing the Comfort Cool  orthosis with functional tasks.  Greatest complaint is scar hypersensitivity still to this point although is able to oppose her thumb to volar SF P1.      Plan:   Continue with ROM, STM, and gentle strength    Goals: Set and discussed today    Active       OT Goals       OT Goal 1       Start:  10/20/23    Expected End:  12/01/23       Patient will demonstrate age normative pinch strength in order to perform baking tasks without compensatory techniques for task completion         OT Goal 2       Start:  10/20/23    Expected End:  11/10/23       Patient will correctly return demonstration of entire HEP to ensure proper carryover to home         OT Goal 3       Start:  10/20/23    Expected End:  12/01/23       Patient will be able to perform all hair care/hygiene and grooming tasks without right thumb pain             Plan of care was developed with input and agreement by the patient      Jovanna Epstein OT

## 2023-11-15 ENCOUNTER — OFFICE VISIT (OUTPATIENT)
Dept: GASTROENTEROLOGY | Facility: CLINIC | Age: 58
End: 2023-11-15
Payer: COMMERCIAL

## 2023-11-15 VITALS — WEIGHT: 141.2 LBS | HEART RATE: 83 BPM | BODY MASS INDEX: 25.98 KG/M2 | OXYGEN SATURATION: 95 % | HEIGHT: 62 IN

## 2023-11-15 DIAGNOSIS — K58.0 IRRITABLE BOWEL SYNDROME WITH DIARRHEA: Primary | ICD-10-CM

## 2023-11-15 PROCEDURE — 1036F TOBACCO NON-USER: CPT | Performed by: INTERNAL MEDICINE

## 2023-11-15 PROCEDURE — 99215 OFFICE O/P EST HI 40 MIN: CPT | Performed by: INTERNAL MEDICINE

## 2023-11-15 ASSESSMENT — ENCOUNTER SYMPTOMS
MYALGIAS: 0
NERVOUS/ANXIOUS: 1
CHILLS: 0
DIFFICULTY URINATING: 0
ARTHRALGIAS: 0
UNEXPECTED WEIGHT CHANGE: 1
FEVER: 0
SHORTNESS OF BREATH: 0
FATIGUE: 0
HEADACHES: 0

## 2023-11-15 NOTE — ASSESSMENT & PLAN NOTE
She has longstanding IBS, however her diarrhea has become progressively worse since earlier in the year.  She now has multiple diarrhea episodes daily which is beginning to severely affect her life.  She has tried OTC medications including Imodium and Pepto Bismol (has tried them for many months), with only limited benefit.  She has responded well to Xifaxan in the past and is hoping to try this again.  There is likely a component of IBS-D (given that she improved while on vacation), however she has had multiple abdominal surgeries in the past and is at risk for SIBO as well.  She has lost weight, which would be unusual for IBS-D and could suggest another etiology such as SIBO or microscopic colitis.  - wrote script for Xifaxan 550 TID x2 weeks with one refill, can do PA if necessary for insurance  - also discussed repeating colonoscopy to biopsy for microscopic colitis; she will consider this if no improvement from Xifaxan  - can continue to use Imodium and IBgard PRN

## 2023-11-15 NOTE — PATIENT INSTRUCTIONS
Let's try to get you another course of Xifaxan since that has helped for you in the past, and since you have tried Imodium and Pepto in the past without much success.    If no improvement, consider repeating the colonoscopy to look for microscopic colitis.

## 2023-11-15 NOTE — PROGRESS NOTES
"Subjective     History of Present Illness   Nereida Conway is a 58 y.o. female with PMHx of anxiety, depression, HTN, HLD, migraines, PAD, peripheral neuropathy who presents to GI clinic for follow up.  Last seen around 10 months ago for evaluation of chronic diarrhea.  She had worsened IBS symptoms after what sounded like an infectious gastroenteritis - we checked stool studies that were normal except for borderline calprotectin (I placed orders for repeat but she did not do this).  She had normal colonoscopy in December 2020.  \"I have been really sick\"  Losing weight - has lost around 35 pounds  Has had at least 10 episodes of sick stomach and diarrhea  Last episode lasted 7 days  Doesn't matter what she's eating, will have constant diarrhea  PCP gave her samples of Xifaxan which does help, was only given 3 days' worth  Gets lasting results from Xifaxan  Has tried Imodium which only helps some times, has also tried Pepto Bismol  Went on vacation and had no problems  Had been taking Imodium but didn't want to take it every day  Starting to really wear her down, getting concerned  She does use IBgard or drinks peppermint tea which can help to settle her stomach    Past history:  Has always had stomach problems her whole life  Was on vacation around 3 months ago and stomach got much worse  Was keeping her home - cramping, diarrhea, churning sounds  Was losing weight as well, wasn't eating well, felt very lethargic  Got occasional black tarry stools but would also be normal occasionally  PCP put her on Flagyl, then Xifaxan which seemed to help somewhat  Last week had steak, woke up with severe cramping and massive diarrhea  Xifaxan was restarted, currently still on this, bowels more normal now  She takes Pepto Bismol occasionally but not every time she saw the black stools  Did notice significant improvement with IBgard but was told to stop taking it for some reason  Weight has stabilized since the initial weight " loss  Her boyfriend is present during the visit and states that she tends to worry a lot about everything  Had negative celiac panel, negative pathogen PCR and O+P (C. diff test was canceled)  Colonoscopy 12/2020 ([facility]) with only hemorrhoids and diverticulosis, 10-year f/u recommended      Review of Systems  Review of Systems   Constitutional:  Positive for unexpected weight change. Negative for chills, fatigue and fever.   Respiratory:  Negative for shortness of breath.    Cardiovascular:  Negative for chest pain.   Genitourinary:  Negative for difficulty urinating.   Musculoskeletal:  Negative for arthralgias and myalgias.   Neurological:  Negative for headaches.   Psychiatric/Behavioral:  The patient is nervous/anxious.    All other systems reviewed and are negative.      Allergies  Allergies   Allergen Reactions    Duloxetine Other and Hallucinations     severe diaphoresis    Gabapentin Hallucinations    Indomethacin Other     severe gi upset    Prednisone Swelling    Sulfamethoxazole Rash       Medications  Current Outpatient Medications   Medication Instructions    acetaminophen (Tylenol Extra Strength) 500 mg tablet oral    amitriptyline (ELAVIL) 50 mg, oral, Nightly    amLODIPine (NORVASC) 5 mg, oral, Daily    atorvastatin (Lipitor) 80 mg tablet 1 tablet, oral, Daily    cholecalciferol (Vitamin D-3) 50 mcg (2,000 unit) capsule take one capsule by mouth once a day    citalopram (CELEXA) 40 mg, oral, Daily    citalopram hydrobromide (CITALOPRAM ORAL) 40 mg, oral    ibuprofen 600 mg, oral, 3 times daily    lisinopriL-hydrochlorothiazide 20-12.5 mg tablet 1 tablet, oral, Daily    propranolol LA (INDERAL LA) 120 mg, oral, Daily    rifAXIMin (XIFAXAN) 200 mg, oral, 3 times daily    rizatriptan (Maxalt) 10 mg tablet TAKE 1 TABLET BY MOUTH AT ONSET OF HEADACHE. MAY REPEAT EVERY 2 HOURS AS NEEDED. MAX 3 TABLETS IN 24 HOURS.    topiramate (TOPAMAX) 25 mg, oral, 2 times daily    traMADol (ULTRAM) 50 mg, oral, Every 6  "hours PRN    traMADol (ULTRAM) 50 mg, oral, 3 times daily PRN    valACYclovir (Valtrex) 500 mg tablet TAKE ONE TABLET BY MOUTH EVERY DAY        Objective     Visit Vitals  Pulse 83   Ht 1.574 m (5' 1.97\")   Wt 64 kg (141 lb 3.2 oz)   SpO2 95%   BMI 25.85 kg/m²   Smoking Status Never   BSA 1.67 m²       Physical Exam  Constitutional:       General: She is not in acute distress.  Eyes:      Extraocular Movements: Extraocular movements intact.   Abdominal:      General: Bowel sounds are normal. There is no distension.      Palpations: Abdomen is soft.      Tenderness: There is no abdominal tenderness. There is no guarding or rebound.   Skin:     General: Skin is warm and dry.   Neurological:      General: No focal deficit present.      Mental Status: She is alert.   Psychiatric:         Mood and Affect: Mood normal.         Behavior: Behavior normal.           Lab Results   Component Value Date    WBC 8.8 07/16/2019    WBC 5.6 07/01/2019    HGB 11.0 (L) 07/16/2019    HGB 12.3 07/01/2019    HCT 33.6 (L) 07/16/2019    HCT 36.4 07/01/2019    MCV 99 07/16/2019    MCV 97 07/01/2019     (L) 07/16/2019     07/01/2019     Lab Results   Component Value Date     07/16/2019     07/01/2019    K 4.3 07/16/2019    K 4.3 07/01/2019     07/16/2019     07/01/2019    CO2 28 07/16/2019    CO2 31 07/01/2019    BUN 11 07/16/2019    BUN 10 07/01/2019    CREATININE 0.79 07/16/2019    CREATININE 0.74 07/01/2019    CALCIUM 8.8 07/16/2019    CALCIUM 9.6 07/01/2019    PROT 6.9 01/31/2022    PROT 7.0 09/10/2018    BILITOT 0.4 09/10/2018    BILITOT 0.3 06/13/2018    ALKPHOS 82 09/10/2018    ALKPHOS 81 06/13/2018    ALT 31 09/10/2018    ALT 37 06/13/2018    AST 16 09/10/2018    AST 20 06/13/2018    GLUCOSE 126 (H) 07/16/2019    GLUCOSE 118 (H) 07/01/2019       Recent Imaging  No results found.    Assessment/Plan    Nereida Conway is a 58 y.o. female who presents to GI clinic for follow up for chronic " diarrhea.    Irritable bowel syndrome with diarrhea  She has longstanding IBS, however her diarrhea has become progressively worse since earlier in the year.  She now has multiple diarrhea episodes daily which is beginning to severely affect her life.  She has tried OTC medications including Imodium and Pepto Bismol (has tried them for many months), with only limited benefit.  She has responded well to Xifaxan in the past and is hoping to try this again.  There is likely a component of IBS-D (given that she improved while on vacation), however she has had multiple abdominal surgeries in the past and is at risk for SIBO as well.  She has lost weight, which would be unusual for IBS-D and could suggest another etiology such as SIBO or microscopic colitis.  - wrote script for Xifaxan 550 TID x2 weeks with one refill, can do PA if necessary for insurance  - also discussed repeating colonoscopy to biopsy for microscopic colitis; she will consider this if no improvement from Xifaxan  - can continue to use Imodium and IBgard PRN       Pedro Durant MD    Time Spent  Prep time on day of patient encounter: 5 minutes  Time spent directly with patient, family or caregiver: 28 minutes  Additional Time Spent on Patient Care Activities: 0 minutes  Documentation Time: 10 minutes  Other Time Spent: 0 minutes  Total: 43 minutes

## 2023-11-15 NOTE — LETTER
November 15, 2023     Lise Jeff PA-C  [address] [id number]    Patient: Nereida Conway   YOB: 1965   Date of Visit: 11/15/2023       Dear Dr. Lise Jeff PA-C:    Thank you for referring Nereida Conway to me for evaluation. Below are the relevant portions of my assessment and plan of care.    Assessment / Plan:    Irritable bowel syndrome with diarrhea  She has longstanding IBS, however her diarrhea has become progressively worse since earlier in the year.  She now has multiple diarrhea episodes daily which is beginning to severely affect her life.  She has tried OTC medications including Imodium and Pepto Bismol (has tried them for many months), with only limited benefit.  She has responded well to Xifaxan in the past and is hoping to try this again.  There is likely a component of IBS-D (given that she improved while on vacation), however she has had multiple abdominal surgeries in the past and is at risk for SIBO as well.  She has lost weight, which would be unusual for IBS-D and could suggest another etiology such as SIBO or microscopic colitis.  - wrote script for Xifaxan 550 TID x2 weeks with one refill, can do PA if necessary for insurance  - also discussed repeating colonoscopy to biopsy for microscopic colitis; she will consider this if no improvement from Xifaxan  - can continue to use Imodium and IBgard PRN     If you have questions, please do not hesitate to reach out to me. I look forward to following Nereida along with you.         Sincerely,        Pedro Durant MD        CC: No Recipients

## 2023-11-16 ENCOUNTER — TELEPHONE (OUTPATIENT)
Dept: GASTROENTEROLOGY | Facility: CLINIC | Age: 58
End: 2023-11-16
Payer: COMMERCIAL

## 2023-11-17 ENCOUNTER — TELEPHONE (OUTPATIENT)
Dept: GASTROENTEROLOGY | Facility: CLINIC | Age: 58
End: 2023-11-17
Payer: COMMERCIAL

## 2023-11-17 NOTE — TELEPHONE ENCOUNTER
Pt wants to know if there is something else you could send to pharmacy in replace of the Xifaxan. She states the insurance will not cover it.

## 2023-11-20 DIAGNOSIS — M19.049 CMC ARTHRITIS: ICD-10-CM

## 2023-11-20 RX ORDER — TRAMADOL HYDROCHLORIDE 50 MG/1
50 TABLET ORAL 3 TIMES DAILY PRN
Qty: 50 TABLET | Refills: 0 | Status: SHIPPED | OUTPATIENT
Start: 2023-11-20 | End: 2023-12-05 | Stop reason: SDUPTHER

## 2023-11-21 ENCOUNTER — TREATMENT (OUTPATIENT)
Dept: OCCUPATIONAL THERAPY | Facility: CLINIC | Age: 58
End: 2023-11-21
Payer: COMMERCIAL

## 2023-11-21 DIAGNOSIS — M25.531 WRIST PAIN, ACUTE, RIGHT: Primary | ICD-10-CM

## 2023-11-21 PROCEDURE — 97110 THERAPEUTIC EXERCISES: CPT | Mod: GO | Performed by: OCCUPATIONAL THERAPIST

## 2023-11-21 PROCEDURE — 97112 NEUROMUSCULAR REEDUCATION: CPT | Mod: GO | Performed by: OCCUPATIONAL THERAPIST

## 2023-11-21 NOTE — PROGRESS NOTES
Occupational Therapy Treatment      Patient Name: Nereida Conway  MRN: [id number]  Today's Date: 11/21/2023  Time Calculation  Start Time: 0915  Stop Time: 0955  Time Calculation (min): 40 min    Insurance:  Visit number: 4 of  Authorization info: required  Insurance Type: [employer]  Authorization certification  -  Start: 10/20/23  End: 12/1/23    Current Problem  1. Wrist pain, acute, right            Referred by: Dr. Munguia  Referred for: R thumb CMC LRTI  DOS: 9-7-23    Fall risk: none  Precautions: WBAT     Hand Dominance: Right      Subjective:  Ppt arrives reporting constant soreness of her thumb and difficulty tolerating texture however it is not causing her to lose sleep    Pain:   3-5/10   Location: R radial wrist snuff box  Description: aching, dull    Objective   RIGHT HAND AROM (Degrees)   MCP PIP DIP DPC   Thumb 0/40      AROM  0/45    AROM    Thumb RABD 35         AROM      Thumb PABD 35         AROM          HAND STRENGTH (Lbs)   R L   Dynamometer      Lateral Pinch 7 15   3jaw Pinch 8 14      Outcome Measures:         QUICK DASH: 65.91%       HEP:  Exercise Sets/Reps Comments   Thumb AROM all POM [id number] 5x/ day   Thumb opposition to SF P1 3x20 rps 5x/ day                                       Treatment Today  Therapeutic ex:  - graded clothespins to increase pinch strength x 8 min  - translating marbles palm to finger to increase ROM x 5 min  - crumble and spread paper to increase thumb coordination x 5 min  - reviewed HEP to ensure proper carryover x 5 min  - wrist ABCs with 2lb DB to increase wrist strength x 5min      Neuro re-ed:  - AROM tendon gliding of all digits to re-ed proprioception and promote desensitization to dorsal thumb and wrist surgical site x 12 min      Assessment:   Pt is progressing very well overall with ROM.  Improving strength at the distal wrist and forearm level as she demonstrates reduced shaking with low grade resistive flexbar.       Plan:   Continue with ROM, STM,  and gentle strength    Goals: Set and discussed today    Active       OT Goals       OT Goal 1       Start:  10/20/23    Expected End:  12/01/23       Patient will demonstrate age normative pinch strength in order to perform baking tasks without compensatory techniques for task completion         OT Goal 2       Start:  10/20/23    Expected End:  11/10/23       Patient will correctly return demonstration of entire HEP to ensure proper carryover to home         OT Goal 3       Start:  10/20/23    Expected End:  12/01/23       Patient will be able to perform all hair care/hygiene and grooming tasks without right thumb pain             Plan of care was developed with input and agreement by the patient      Jovanna Epstein, OT

## 2023-11-27 ENCOUNTER — OFFICE VISIT (OUTPATIENT)
Dept: ORTHOPEDIC SURGERY | Facility: CLINIC | Age: 58
End: 2023-11-27
Payer: COMMERCIAL

## 2023-11-27 VITALS — BODY MASS INDEX: 26.62 KG/M2 | WEIGHT: 141 LBS | HEIGHT: 61 IN

## 2023-11-27 DIAGNOSIS — M19.049 CMC ARTHRITIS: Primary | ICD-10-CM

## 2023-11-27 PROCEDURE — 20600 DRAIN/INJ JOINT/BURSA W/O US: CPT | Performed by: ORTHOPAEDIC SURGERY

## 2023-11-27 PROCEDURE — 99024 POSTOP FOLLOW-UP VISIT: CPT | Performed by: ORTHOPAEDIC SURGERY

## 2023-11-27 PROCEDURE — 1036F TOBACCO NON-USER: CPT | Performed by: ORTHOPAEDIC SURGERY

## 2023-11-27 RX ORDER — TRIAMCINOLONE ACETONIDE 40 MG/ML
20 INJECTION, SUSPENSION INTRA-ARTICULAR; INTRAMUSCULAR
Status: COMPLETED | OUTPATIENT
Start: 2023-11-27 | End: 2023-11-27

## 2023-11-27 RX ORDER — LIDOCAINE HYDROCHLORIDE 10 MG/ML
0.5 INJECTION INFILTRATION; PERINEURAL
Status: COMPLETED | OUTPATIENT
Start: 2023-11-27 | End: 2023-11-27

## 2023-11-27 RX ADMIN — LIDOCAINE HYDROCHLORIDE 0.5 ML: 10 INJECTION INFILTRATION; PERINEURAL at 09:11

## 2023-11-27 RX ADMIN — TRIAMCINOLONE ACETONIDE 20 MG: 40 INJECTION, SUSPENSION INTRA-ARTICULAR; INTRAMUSCULAR at 09:11

## 2023-11-27 ASSESSMENT — PAIN DESCRIPTION - DESCRIPTORS: DESCRIPTORS: ACHING

## 2023-11-27 ASSESSMENT — PAIN - FUNCTIONAL ASSESSMENT: PAIN_FUNCTIONAL_ASSESSMENT: 0-10

## 2023-11-27 ASSESSMENT — PAIN SCALES - GENERAL: PAINLEVEL_OUTOF10: 5 - MODERATE PAIN

## 2023-11-27 NOTE — PROGRESS NOTES
Postoperative follow-up after right thumb CMC arthroplasty.  Date of surgery September 7.  Making good progress in therapy.  Pain is improving.  We have been able to wean her back on her tramadol prescriptions.  Her left thumb is becoming more bothersome because of overuse and she is requesting injection for her left thumb today.    Examination of the right hand reveals healed surgical incision.  She has near symmetric thumb range of motion bilaterally.  Persistent tenderness to palpation right thumb CMC joint.  On the left hand has a positive thumb CMC grind test.    Impression: Bilateral thumb arthritis.    Plan: We are going to have her continue her therapy.  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  I am going to give her an injection into her left thumb today.  She will follow-up with me in 6 weeks.    S Inj/Asp: L thumb CMC on 11/27/2023 9:11 AM  Indications: pain  Details: 25 G needle, dorsal approach  Medications: 20 mg triamcinolone acetonide 40 mg/mL; 0.5 mL lidocaine 10 mg/mL (1 %)  Outcome: tolerated well, no immediate complications  Procedure, treatment alternatives, risks and benefits explained, specific risks discussed. Consent was given by the patient. Immediately prior to procedure a time out was called to verify the correct patient, procedure, equipment, support staff and site/side marked as required. Patient was prepped and draped in the usual sterile fashion.       Jurgen Munguia MD    [employer]  Department of Orthopaedic Surgery  Chief of Hand and Upper Extremity Surgery  [facility]    Dictation performed with the use of voice recognition software. Syntax and grammatical errors may exist.

## 2023-11-28 ENCOUNTER — TELEPHONE (OUTPATIENT)
Dept: GASTROENTEROLOGY | Facility: CLINIC | Age: 58
End: 2023-11-28

## 2023-11-28 ENCOUNTER — APPOINTMENT (OUTPATIENT)
Dept: OCCUPATIONAL THERAPY | Facility: CLINIC | Age: 58
End: 2023-11-28
Payer: COMMERCIAL

## 2023-11-29 ENCOUNTER — OFFICE VISIT (OUTPATIENT)
Dept: OPHTHALMOLOGY | Facility: CLINIC | Age: 58
End: 2023-11-29
Payer: COMMERCIAL

## 2023-11-29 DIAGNOSIS — H04.123 INSUFFICIENCY OF TEAR FILM OF BOTH EYES: ICD-10-CM

## 2023-11-29 DIAGNOSIS — H52.31 ANISOMETROPIA: ICD-10-CM

## 2023-11-29 DIAGNOSIS — H25.811 COMBINED FORMS OF AGE-RELATED CATARACT OF RIGHT EYE: Primary | ICD-10-CM

## 2023-11-29 DIAGNOSIS — H40.003 PREGLAUCOMA, UNSPECIFIED, BILATERAL: ICD-10-CM

## 2023-11-29 PROCEDURE — 99214 OFFICE O/P EST MOD 30 MIN: CPT | Performed by: OPHTHALMOLOGY

## 2023-11-29 ASSESSMENT — VISUAL ACUITY
CORRECTION_TYPE: GLASSES
OD_CC+: +1
OS_CC: 20/25
METHOD: SNELLEN - LINEAR
OS_CC+: -2
OD_PH_CC: 20/60
OD_PH_CC+: +1
OD_CC: 20/100
OD_BAT_HIGH: 20/150

## 2023-11-29 ASSESSMENT — ENCOUNTER SYMPTOMS
HEMATOLOGIC/LYMPHATIC NEGATIVE: 0
LOSS OF SENSATION IN FEET: 0
PSYCHIATRIC NEGATIVE: 0
CONSTITUTIONAL NEGATIVE: 0
RESPIRATORY NEGATIVE: 0
OCCASIONAL FEELINGS OF UNSTEADINESS: 0
MUSCULOSKELETAL NEGATIVE: 1
CARDIOVASCULAR NEGATIVE: 0
GASTROINTESTINAL NEGATIVE: 0
ENDOCRINE NEGATIVE: 0
DEPRESSION: 0
EYES NEGATIVE: 0
ALLERGIC/IMMUNOLOGIC NEGATIVE: 0
NEUROLOGICAL NEGATIVE: 0

## 2023-11-29 ASSESSMENT — SLIT LAMP EXAM - LIDS
COMMENTS: 1+ BLEPHARITIS, 1+ DERMATOCHALASIS - UPPER LID
COMMENTS: 1+ BLEPHARITIS, 1+ DERMATOCHALASIS - UPPER LID

## 2023-11-29 ASSESSMENT — EXTERNAL EXAM - LEFT EYE: OS_EXAM: NORMAL

## 2023-11-29 ASSESSMENT — PAIN SCALES - GENERAL: PAINLEVEL: 0-NO PAIN

## 2023-11-29 ASSESSMENT — TONOMETRY
OD_IOP_MMHG: 16
IOP_METHOD: GOLDMANN APPLANATION
OS_IOP_MMHG: 16

## 2023-11-29 ASSESSMENT — REFRACTION_WEARINGRX
OD_AXIS: 11
OD_SPHERE: -5.75
OD_ADD: 2.50
OS_ADD: 2.50
SPECS_TYPE: PROGRESSIVE
OD_CYLINDER: -0.50
OS_SPHERE: -3.25

## 2023-11-29 ASSESSMENT — PATIENT HEALTH QUESTIONNAIRE - PHQ9
SUM OF ALL RESPONSES TO PHQ9 QUESTIONS 1 AND 2: 0
2. FEELING DOWN, DEPRESSED OR HOPELESS: NOT AT ALL
1. LITTLE INTEREST OR PLEASURE IN DOING THINGS: NOT AT ALL

## 2023-11-29 ASSESSMENT — EXTERNAL EXAM - RIGHT EYE: OD_EXAM: NORMAL

## 2023-11-29 ASSESSMENT — PACHYMETRY
OD_CT(UM): 550
OS_CT(UM): 531

## 2023-11-29 ASSESSMENT — CUP TO DISC RATIO
OS_RATIO: 0.4
OD_RATIO: 0.5

## 2023-11-29 NOTE — PROGRESS NOTES
Subjective   Patient ID: Nereida Conway is a 58 y.o. female.    Chief Complaint    Follow-up       HPI    Pt says vision right eye (OD) is still bothering her and her eye strain is getting worse.    Had thumb joint replacement recently.  No other changes in health history or meds.  Vision right eye (OD) is worsening.  Left eye (OS) is still fine.    Last edited by Angelito Ashby MD on 11/29/2023 11:05 AM.        No current outpatient medications on file. (Ophthalmology pharm classes)       Current Outpatient Medications (Other)   Medication Sig Dispense Refill    acetaminophen (Tylenol Extra Strength) 500 mg tablet Take by mouth.      amitriptyline (Elavil) 50 mg tablet Take 1 tablet (50 mg) by mouth once daily at bedtime. 90 tablet 1    amLODIPine (Norvasc) 5 mg tablet Take 1 tablet (5 mg) by mouth once daily.      atorvastatin (Lipitor) 80 mg tablet Take 1 tablet (80 mg) by mouth once daily.      cholecalciferol (Vitamin D-3) 50 mcg (2,000 unit) capsule take one capsule by mouth once a day 90 capsule 3    citalopram (CeleXA) 40 mg tablet TAKE 1 TABLET BY MOUTH EVERY DAY 30 tablet 0    citalopram hydrobromide (CITALOPRAM ORAL) Take 40 mg by mouth.      lisinopriL-hydrochlorothiazide 20-12.5 mg tablet Take 1 tablet by mouth once daily. 90 tablet 3    propranolol LA (Inderal LA) 120 mg 24 hr capsule Take 1 capsule (120 mg) by mouth once daily. 90 capsule 3    rizatriptan (Maxalt) 10 mg tablet TAKE 1 TABLET BY MOUTH AT ONSET OF HEADACHE. MAY REPEAT EVERY 2 HOURS AS NEEDED. MAX 3 TABLETS IN 24 HOURS. 30 tablet 0    topiramate (Topamax) 25 mg tablet Take 1 tablet (25 mg) by mouth 2 times a day.      traMADol (Ultram) 50 mg tablet Take 1 tablet (50 mg) by mouth every 6 hours if needed for severe pain (7 - 10) for up to 50 doses. 50 tablet 0    traMADol (Ultram) 50 mg tablet Take 1 tablet (50 mg) by mouth 3 times a day as needed for severe pain (7 - 10) for up to 50 doses. 50 tablet 0    valACYclovir (Valtrex) 500 mg  tablet TAKE ONE TABLET BY MOUTH EVERY DAY 30 tablet 11    rifAXIMin (Xifaxan) 200 mg tablet Take 1 tablet (200 mg) by mouth 3 times a day for 28 days. 42 tablet 1       Objective   Base Eye Exam       Visual Acuity (Snellen - Linear)         Right Left    Dist cc 20/100 +1 20/25 -2    Dist ph cc 20/60 +1       Correction: Glasses              Tonometry (Goldmann Applanation, 9:44 AM)         Right Left    Pressure 16 16              Pupils         Dark Shape React APD    Right 5 Round 2 None    Left 5 Round 2 None              Extraocular Movement         Right Left     Full Full              Dilation       Both eyes: 1% Tropic 2.5% Phen @ 9:44 AM                  Additional Tests       Glare Testing         High    Right 20/150    Left                   Slit Lamp and Fundus Exam       External Exam         Right Left    External Normal Normal              Slit Lamp Exam         Right Left    Lids/Lashes 1+ Blepharitis, 1+ Dermatochalasis - upper lid 1+ Blepharitis, 1+ Dermatochalasis - upper lid    Conjunctiva/Sclera normal bulbar and palepbral conjunctiva normal bulbar and palepbral conjunctiva    Cornea normal epi/stroma/endo and tear film normal epi/stroma/endo and tear film    Anterior Chamber ant. chamber deep and quiet ant. chamber deep and quiet    Iris iris normal iris normal    Lens 3+ Nuclear sclerosis, 2+ Cortical cataract 1+ Nuclear sclerosis, 1+ Cortical cataract    Anterior Vitreous vitreous clear and normal vitreous clear and normal              Fundus Exam         Right Left    Disc normal optic nerve normal optic nerve    C/D Ratio 0.5 0.4    Macula normal macula normal macula    Vessels normal retinal vessels normal retinal vessels    Periphery normal retinal periphery normal retinal periphery                  Refraction       Wearing Rx         Sphere Cylinder Axis Add    Right -5.75 -0.50 11 2.50    Left -3.25   2.50      Type: progressive                    Assessment/Plan   Problem List Items  Addressed This Visit          Eye/Vision problems    Anisometropia    Preglaucoma, unspecified, bilateral    Tear film insufficiency    Combined forms of age-related cataract of right eye - Primary     Discussed R, B, and A to cats and surgery right eye (OD) and will proceed.  Warned of anisometropia after surgery and right eye (OD) needing reading rx.

## 2023-11-29 NOTE — ASSESSMENT & PLAN NOTE
Discussed R, B, and A to cats and surgery right eye (OD) and will proceed.  Warned of anisometropia after surgery and right eye (OD) needing reading rx.

## 2023-11-30 DIAGNOSIS — F32.5 MAJOR DEPRESSIVE DISORDER WITH SINGLE EPISODE, IN FULL REMISSION (CMS-HCC): ICD-10-CM

## 2023-11-30 RX ORDER — CITALOPRAM 40 MG/1
40 TABLET, FILM COATED ORAL DAILY
Qty: 30 TABLET | Refills: 0 | Status: SHIPPED | OUTPATIENT
Start: 2023-11-30 | End: 2023-12-29

## 2023-12-01 ENCOUNTER — APPOINTMENT (OUTPATIENT)
Dept: GASTROENTEROLOGY | Facility: CLINIC | Age: 58
End: 2023-12-01
Payer: COMMERCIAL

## 2023-12-01 DIAGNOSIS — K58.0 IRRITABLE BOWEL SYNDROME WITH DIARRHEA: Primary | ICD-10-CM

## 2023-12-04 ENCOUNTER — APPOINTMENT (OUTPATIENT)
Dept: ORTHOPEDIC SURGERY | Facility: CLINIC | Age: 58
End: 2023-12-04
Payer: COMMERCIAL

## 2023-12-05 DIAGNOSIS — G43.701 CHRONIC MIGRAINE WITHOUT AURA WITH STATUS MIGRAINOSUS, NOT INTRACTABLE: ICD-10-CM

## 2023-12-05 DIAGNOSIS — M19.049 CMC ARTHRITIS: ICD-10-CM

## 2023-12-05 RX ORDER — RIZATRIPTAN BENZOATE 10 MG/1
TABLET ORAL
Qty: 30 TABLET | Refills: 0 | Status: SHIPPED | OUTPATIENT
Start: 2023-12-05 | End: 2024-05-28

## 2023-12-05 RX ORDER — TRAMADOL HYDROCHLORIDE 50 MG/1
50 TABLET ORAL 3 TIMES DAILY PRN
Qty: 50 TABLET | Refills: 0 | Status: SHIPPED | OUTPATIENT
Start: 2023-12-05 | End: 2024-01-18 | Stop reason: SDUPTHER

## 2023-12-08 ENCOUNTER — TREATMENT (OUTPATIENT)
Dept: OCCUPATIONAL THERAPY | Facility: CLINIC | Age: 58
End: 2023-12-08
Payer: COMMERCIAL

## 2023-12-08 DIAGNOSIS — M25.531 WRIST PAIN, ACUTE, RIGHT: Primary | ICD-10-CM

## 2023-12-08 PROCEDURE — 97110 THERAPEUTIC EXERCISES: CPT | Mod: GO | Performed by: OCCUPATIONAL THERAPIST

## 2023-12-08 PROCEDURE — 97112 NEUROMUSCULAR REEDUCATION: CPT | Mod: GO | Performed by: OCCUPATIONAL THERAPIST

## 2023-12-08 NOTE — PROGRESS NOTES
Occupational Therapy Treatment      Patient Name: Nereida Cnoway  MRN: [id number]  Today's Date: 12/8/2023  Time Calculation  Start Time: 0115  Stop Time: 0155  Time Calculation (min): 40 min    Insurance:  Visit number: 4 of  Authorization info: required  Insurance Type: [employer]  Authorization certification  -  Start: 10/20/23  End: 12/1/23    Current Problem  1. Wrist pain, acute, right              Referred by: Dr. Munguia  Referred for: R thumb CMC LRTI  DOS: 9-7-23    Fall risk: none  Precautions: WBAT     Hand Dominance: Right      Subjective:  Ppt arrives reporting constant soreness of her thumb and difficulty tolerating texture however it is not causing her to lose sleep    Pain:   3-5/10   Location: R radial wrist snuff box  Description: aching, dull    Objective   RIGHT HAND AROM (Degrees)   MCP PIP DIP DPC   Thumb 0/40      AROM  0/45    AROM    Thumb RABD 35         AROM      Thumb PABD 35         AROM          HAND STRENGTH (Lbs)   R L   Dynamometer      Lateral Pinch 7 15   3jaw Pinch 8 14      Outcome Measures:         QUICK DASH: 65.91%       HEP:  Exercise Sets/Reps Comments   Thumb AROM all POM [id number] 5x/ day   Thumb opposition to SF P1 3x20 rps 5x/ day                                       Treatment Today  Therapeutic ex:  - graded clothespins to increase pinch strength x 8 min  - translating marbles palm to finger to increase ROM x 5 min  - crumble and spread paper to increase thumb coordination x 5 min  - wrist ABCs with 1lb DB to increase wrist strength x 5min  - red flexbar sup/pront o increase strength of distal wrist 3x12  - reviewed HEP to ensure proper carryover x 5 min    Neuro re-ed:  - AROM tendon gliding of all digits to re-ed proprioception and promote desensitization to dorsal thumb and wrist surgical site x 12 min      Assessment:   Pt is progressing very well overall with ROM.  Improving strength at the distal wrist and forearm level as she demonstrates reduced shaking with low  grade resistive flexbar.   Much less reactivity today with gentle resistance.  Good indication of progressive healing.    Plan:   Continue with ROM, STM, and gentle strength    Goals: Set and discussed today    Active       OT Goals       OT Goal 1 (Progressing)       Start:  10/20/23    Expected End:  01/12/24       Patient will demonstrate age normative pinch strength in order to perform baking tasks without compensatory techniques for task completion         OT Goal 2 (Met)       Start:  10/20/23    Expected End:  12/08/23    Resolved:  12/08/23    Patient will correctly return demonstration of entire HEP to ensure proper carryover to home         OT Goal 3 (Progressing)       Start:  10/20/23    Expected End:  01/12/24       Patient will be able to perform all hair care/hygiene and grooming tasks without right thumb pain             Plan of care was developed with input and agreement by the patient      Jovanna Epstein OT

## 2023-12-11 ENCOUNTER — APPOINTMENT (OUTPATIENT)
Dept: OCCUPATIONAL THERAPY | Facility: CLINIC | Age: 58
End: 2023-12-11
Payer: COMMERCIAL

## 2023-12-12 DIAGNOSIS — K58.0 IRRITABLE BOWEL SYNDROME WITH DIARRHEA: Primary | ICD-10-CM

## 2023-12-13 ENCOUNTER — OFFICE VISIT (OUTPATIENT)
Dept: OPHTHALMOLOGY | Facility: CLINIC | Age: 58
End: 2023-12-13
Payer: COMMERCIAL

## 2023-12-13 ENCOUNTER — CLINICAL SUPPORT (OUTPATIENT)
Dept: OPHTHALMOLOGY | Facility: CLINIC | Age: 58
End: 2023-12-13
Payer: COMMERCIAL

## 2023-12-13 ENCOUNTER — PREP FOR PROCEDURE (OUTPATIENT)
Dept: OPHTHALMOLOGY | Facility: CLINIC | Age: 58
End: 2023-12-13

## 2023-12-13 DIAGNOSIS — H25.11 AGE-RELATED NUCLEAR CATARACT OF RIGHT EYE: Primary | ICD-10-CM

## 2023-12-13 DIAGNOSIS — H25.811 COMBINED FORMS OF AGE-RELATED CATARACT OF RIGHT EYE: Primary | ICD-10-CM

## 2023-12-13 DIAGNOSIS — H25.811 COMBINED FORMS OF AGE-RELATED CATARACT OF RIGHT EYE: ICD-10-CM

## 2023-12-13 PROCEDURE — 92136 OPHTHALMIC BIOMETRY: CPT | Performed by: OPHTHALMOLOGY

## 2023-12-13 PROCEDURE — 92136 OPHTHALMIC BIOMETRY: CPT | Mod: RIGHT SIDE | Performed by: OPHTHALMOLOGY

## 2023-12-13 PROCEDURE — 1036F TOBACCO NON-USER: CPT | Performed by: OPHTHALMOLOGY

## 2023-12-13 PROCEDURE — 76513 OPH US DX ANT SGM US UNI/BI: CPT | Mod: RIGHT SIDE | Performed by: OPHTHALMOLOGY

## 2023-12-13 RX ORDER — CYCLOPENTOLATE HYDROCHLORIDE 10 MG/ML
1 SOLUTION/ DROPS OPHTHALMIC
Status: CANCELLED | OUTPATIENT
Start: 2023-12-13 | End: 2023-12-13

## 2023-12-13 RX ORDER — PHENYLEPHRINE HYDROCHLORIDE 25 MG/ML
1 SOLUTION/ DROPS OPHTHALMIC
Status: CANCELLED | OUTPATIENT
Start: 2023-12-13 | End: 2023-12-13

## 2023-12-13 RX ORDER — SODIUM CHLORIDE, SODIUM LACTATE, POTASSIUM CHLORIDE, CALCIUM CHLORIDE 600; 310; 30; 20 MG/100ML; MG/100ML; MG/100ML; MG/100ML
50 INJECTION, SOLUTION INTRAVENOUS CONTINUOUS
Status: CANCELLED | OUTPATIENT
Start: 2024-01-25

## 2023-12-13 RX ORDER — FLURBIPROFEN SODIUM 0.3 MG/ML
1 SOLUTION/ DROPS OPHTHALMIC
Status: CANCELLED | OUTPATIENT
Start: 2023-12-13 | End: 2023-12-13

## 2023-12-13 ASSESSMENT — ENCOUNTER SYMPTOMS
OCCASIONAL FEELINGS OF UNSTEADINESS: 0
DEPRESSION: 0
LOSS OF SENSATION IN FEET: 0

## 2023-12-13 ASSESSMENT — PATIENT HEALTH QUESTIONNAIRE - PHQ9
2. FEELING DOWN, DEPRESSED OR HOPELESS: NOT AT ALL
1. LITTLE INTEREST OR PLEASURE IN DOING THINGS: NOT AT ALL
SUM OF ALL RESPONSES TO PHQ9 QUESTIONS 1 AND 2: 0

## 2023-12-13 ASSESSMENT — PACHYMETRY
OD_CT(UM): 550
OS_CT(UM): 531

## 2023-12-13 ASSESSMENT — REFRACTION_WEARINGRX
OS_SPHERE: -3.25
OD_AXIS: 11
OS_ADD: 2.50
SPECS_TYPE: PROGRESSIVE
OD_ADD: 2.50
OD_SPHERE: -5.75
OD_CYLINDER: -0.50

## 2023-12-13 ASSESSMENT — PAIN SCALES - GENERAL: PAINLEVEL: 0-NO PAIN

## 2023-12-13 NOTE — PROGRESS NOTES
Subjective   Patient ID: Nereida Conway is a 58 y.o. female.    Chief Complaint    iol measurements         No current outpatient medications on file. (Ophthalmology pharm classes)       Current Outpatient Medications (Other)   Medication Sig Dispense Refill    acetaminophen (Tylenol Extra Strength) 500 mg tablet Take by mouth.      amitriptyline (Elavil) 50 mg tablet Take 1 tablet (50 mg) by mouth once daily at bedtime. 90 tablet 1    amLODIPine (Norvasc) 5 mg tablet Take 1 tablet (5 mg) by mouth once daily.      atorvastatin (Lipitor) 80 mg tablet Take 1 tablet (80 mg) by mouth once daily.      cholecalciferol (Vitamin D-3) 50 mcg (2,000 unit) capsule take one capsule by mouth once a day 90 capsule 3    citalopram (CeleXA) 40 mg tablet TAKE ONE TABLET BY MOUTH EVERY DAY 30 tablet 0    citalopram hydrobromide (CITALOPRAM ORAL) Take 40 mg by mouth.      lisinopriL-hydrochlorothiazide 20-12.5 mg tablet Take 1 tablet by mouth once daily. 90 tablet 3    propranolol LA (Inderal LA) 120 mg 24 hr capsule Take 1 capsule (120 mg) by mouth once daily. 90 capsule 3    rifAXIMin (Xifaxan) 550 mg tablet Take 1 tablet (550 mg) by mouth 3 times a day. 42 tablet 3    rizatriptan (Maxalt) 10 mg tablet TAKE 1 TABLET BY MOUTH AT ONSET OF HEADACHE. MAY REPEAT EVERY 2 HOURS AS NEEDED. MAXIMUM OF 3 TABLETS IN 24 HOURS 30 tablet 0    topiramate (Topamax) 25 mg tablet Take 1 tablet (25 mg) by mouth 2 times a day.      traMADol (Ultram) 50 mg tablet Take 1 tablet (50 mg) by mouth every 6 hours if needed for severe pain (7 - 10) for up to 50 doses. 50 tablet 0    traMADol (Ultram) 50 mg tablet Take 1 tablet (50 mg) by mouth 3 times a day as needed for severe pain (7 - 10) for up to 50 doses. 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

## 2023-12-13 NOTE — PROGRESS NOTES
Subjective   Patient ID: Nereida Conway is a 58 y.o. female.    Chief Complaint    iol measurements         No current outpatient medications on file. (Ophthalmology pharm classes)       Current Outpatient Medications (Other)   Medication Sig Dispense Refill    acetaminophen (Tylenol Extra Strength) 500 mg tablet Take by mouth.      amitriptyline (Elavil) 50 mg tablet Take 1 tablet (50 mg) by mouth once daily at bedtime. 90 tablet 1    amLODIPine (Norvasc) 5 mg tablet Take 1 tablet (5 mg) by mouth once daily.      atorvastatin (Lipitor) 80 mg tablet Take 1 tablet (80 mg) by mouth once daily.      cholecalciferol (Vitamin D-3) 50 mcg (2,000 unit) capsule take one capsule by mouth once a day 90 capsule 3    citalopram (CeleXA) 40 mg tablet TAKE ONE TABLET BY MOUTH EVERY DAY 30 tablet 0    citalopram hydrobromide (CITALOPRAM ORAL) Take 40 mg by mouth.      lisinopriL-hydrochlorothiazide 20-12.5 mg tablet Take 1 tablet by mouth once daily. 90 tablet 3    propranolol LA (Inderal LA) 120 mg 24 hr capsule Take 1 capsule (120 mg) by mouth once daily. 90 capsule 3    rifAXIMin (Xifaxan) 550 mg tablet Take 1 tablet (550 mg) by mouth 3 times a day. 42 tablet 3    rizatriptan (Maxalt) 10 mg tablet TAKE 1 TABLET BY MOUTH AT ONSET OF HEADACHE. MAY REPEAT EVERY 2 HOURS AS NEEDED. MAXIMUM OF 3 TABLETS IN 24 HOURS 30 tablet 0    topiramate (Topamax) 25 mg tablet Take 1 tablet (25 mg) by mouth 2 times a day.      traMADol (Ultram) 50 mg tablet Take 1 tablet (50 mg) by mouth every 6 hours if needed for severe pain (7 - 10) for up to 50 doses. 50 tablet 0    traMADol (Ultram) 50 mg tablet Take 1 tablet (50 mg) by mouth 3 times a day as needed for severe pain (7 - 10) for up to 50 doses. 50 tablet 0    valACYclovir (Valtrex) 500 mg tablet TAKE ONE TABLET BY MOUTH EVERY DAY 30 tablet 11       Objective   Refraction       Wearing Rx         Sphere Cylinder Axis Add    Right -5.75 -0.50 11 2.50    Left -3.25   2.50      Type: progressive                     Assessment/Plan

## 2023-12-14 ENCOUNTER — TELEPHONE (OUTPATIENT)
Dept: OPHTHALMOLOGY | Facility: CLINIC | Age: 58
End: 2023-12-14
Payer: COMMERCIAL

## 2023-12-18 DIAGNOSIS — M19.049 CMC ARTHRITIS: ICD-10-CM

## 2023-12-18 DIAGNOSIS — A60.04 HERPES SIMPLEX VULVOVAGINITIS: ICD-10-CM

## 2023-12-18 RX ORDER — TRAMADOL HYDROCHLORIDE 50 MG/1
50 TABLET ORAL EVERY 6 HOURS PRN
Qty: 50 TABLET | Refills: 1 | Status: SHIPPED | OUTPATIENT
Start: 2023-12-18 | End: 2024-01-15 | Stop reason: SDUPTHER

## 2023-12-19 ENCOUNTER — TREATMENT (OUTPATIENT)
Dept: OCCUPATIONAL THERAPY | Facility: CLINIC | Age: 58
End: 2023-12-19
Payer: COMMERCIAL

## 2023-12-19 DIAGNOSIS — M25.531 WRIST PAIN, ACUTE, RIGHT: Primary | ICD-10-CM

## 2023-12-19 PROCEDURE — 97112 NEUROMUSCULAR REEDUCATION: CPT | Mod: GO | Performed by: OCCUPATIONAL THERAPIST

## 2023-12-19 PROCEDURE — 97110 THERAPEUTIC EXERCISES: CPT | Mod: GO | Performed by: OCCUPATIONAL THERAPIST

## 2023-12-19 NOTE — PROGRESS NOTES
Occupational Therapy Treatment & Re-check      Patient Name: Nereida Conway  MRN: [id number]  Today's Date: 12/19/2023  Time Calculation  Start Time: 1100  Stop Time: 1145  Time Calculation (min): 45 min    Insurance:  Visit number: 5 of 30  Authorization info: required  Insurance Type: [place]  Authorization certification  -  Start: 12/19/23  End: 1/19/24    Current Problem  1. Wrist pain, acute, right  Follow Up In Occupational Therapy          Referred by: Dr. Munguia  Referred for: R thumb CMC LRTI  DOS: 9-7-23    Fall risk: none  Precautions: WBAT     Hand Dominance: Right      Subjective:  Ppt arrives reporting constant soreness of her thumb and difficulty tolerating texture however it is not causing her to lose sleep    Pain:   3-5/10   Location: R radial wrist snuff box  Description: aching, dull    Objective   RIGHT HAND AROM (Degrees)   MCP PIP DIP DPC   Thumb 0/40      AROM  0/45    AROM    Thumb RABD 35         AROM      Thumb PABD 35         AROM          HAND STRENGTH (Lbs)   R L   Dynamometer      Lateral Pinch 12lb 15   3jaw Pinch 12lb 14      Outcome Measures:         QUICK DASH: 29.55%               Treatment Today  Therapeutic ex:  - graded clothespins to increase pinch strength x 8 min  - translating marbles palm to finger to increase ROM x 5 min  - crumble and spread paper to increase thumb coordination x 5 min  - wrist ABCs with 1lb DB to increase wrist strength x 5min  - red flexbar sup/pront o increase strength of distal wrist 3x12  - reviewed HEP to ensure proper carryover x 5 min    Neuro re-ed:  - AROM tendon gliding of all digits to re-ed proprioception and promote desensitization to dorsal thumb and wrist surgical site x 12 min      Assessment:   Pt is progressing very well overall with strength.  Exhibits improved QuickDash score from 66% to 29%.    Much less reactivity today with gentle resistance.  Good indication of progressive healing. Patient is making excellent progress towards  achieving all goals and would benefit from receiving skilled services to further progress her  and pinch strength required to compelte her ADL/IADL tasks.    Plan:   Continue with ROM, STM, and gentle strength    Goals: Set and discussed today    Active       OT Goals       OT Goal 1 (Progressing)       Start:  10/20/23    Expected End:  01/12/24       Patient will demonstrate age normative pinch strength in order to perform baking tasks without compensatory techniques for task completion         OT Goal 2 (Met)       Start:  10/20/23    Expected End:  12/08/23    Resolved:  12/08/23    Patient will correctly return demonstration of entire HEP to ensure proper carryover to home         OT Goal 3 (Progressing)       Start:  10/20/23    Expected End:  01/12/24       Patient will be able to perform all hair care/hygiene and grooming tasks without right thumb pain             Plan of care was developed with input and agreement by the patient      Jovanna Epstein OT

## 2023-12-27 ENCOUNTER — TELEPHONE (OUTPATIENT)
Dept: OPHTHALMOLOGY | Facility: CLINIC | Age: 58
End: 2023-12-27
Payer: COMMERCIAL

## 2023-12-27 DIAGNOSIS — H25.819 COMBINED FORM OF SENILE CATARACT, UNSPECIFIED LATERALITY: Primary | ICD-10-CM

## 2023-12-27 RX ORDER — CIPROFLOXACIN HYDROCHLORIDE 3 MG/ML
1 SOLUTION/ DROPS OPHTHALMIC 4 TIMES DAILY
Qty: 5 ML | Refills: 1 | Status: SHIPPED | OUTPATIENT
Start: 2024-01-24

## 2023-12-27 RX ORDER — PREDNISOLONE ACETATE 10 MG/ML
1 SUSPENSION/ DROPS OPHTHALMIC 4 TIMES DAILY
Qty: 5 ML | Refills: 1 | Status: SHIPPED | OUTPATIENT
Start: 2024-01-26

## 2023-12-27 NOTE — TELEPHONE ENCOUNTER
LM FOR PT TO CONFIRM. PENDING CATARACT SURGERY DATE right eye (OD) ONLY 1/25/24-KEANE      CONFIRMED DATE-KEANE

## 2023-12-28 ENCOUNTER — TREATMENT (OUTPATIENT)
Dept: OCCUPATIONAL THERAPY | Facility: CLINIC | Age: 58
End: 2023-12-28
Payer: COMMERCIAL

## 2023-12-28 DIAGNOSIS — F32.5 MAJOR DEPRESSIVE DISORDER WITH SINGLE EPISODE, IN FULL REMISSION (CMS-HCC): ICD-10-CM

## 2023-12-28 DIAGNOSIS — M25.531 WRIST PAIN, ACUTE, RIGHT: Primary | ICD-10-CM

## 2023-12-28 PROCEDURE — 97110 THERAPEUTIC EXERCISES: CPT | Mod: GO | Performed by: OCCUPATIONAL THERAPIST

## 2023-12-28 PROCEDURE — 97112 NEUROMUSCULAR REEDUCATION: CPT | Mod: GO | Performed by: OCCUPATIONAL THERAPIST

## 2023-12-28 NOTE — PROGRESS NOTES
"  Occupational Therapy Treatment      Patient Name: Nereida Conway  MRN: [id number]  Today's Date: 12/28/2023  Time Calculation  Start Time: 1145  Stop Time: 1227  Time Calculation (min): 42 min    Insurance:  Visit number: 5 of 30  Authorization info: required  Insurance Type: [employer]  Authorization certification  -  Start: 12/19/23  End: 1/19/24    Current Problem  1. Wrist pain, acute, right  Follow Up In Occupational Therapy          Referred by: Dr. Munguia  Referred for: R thumb CMC LRTI  DOS: 9-7-23    Fall risk: none  Precautions: WBAT     Hand Dominance: Right      Subjective:  \"I have seen a lot of improvement in my R hand.  Sleeping so much better and wearing the brace less inside the house.\"    Pain:   2/10   Location: R radial wrist snuff box  Description: aching, dull    Objective   RIGHT HAND AROM (Degrees)   MCP PIP DIP DPC   Thumb 0/40      AROM  0/45    AROM    Thumb RABD 35         AROM      Thumb PABD 35         AROM          HAND STRENGTH (Lbs)   R L   Dynamometer      Lateral Pinch 12lb 15   3jaw Pinch 12lb 14      Outcome Measures:         QUICK DASH: 29.55%               Treatment Today  Therapeutic ex:  - graded clothespins to increase pinch strength x 8 min  - translating marbles palm to finger to increase ROM x 5 min  - crumble and spread paper to increase thumb coordination x 5 min  - wrist ABCs with 1lb DB to increase wrist strength x 5min  - red flexbar sup/pront o increase strength of distal wrist 3x12  - red putty finger ADD and MCP flexion to increase strength x 8 min  - reviewed HEP to ensure proper carryover x 5 min    Neuro re-ed:  - AROM tendon gliding of all digits to re-ed proprioception and promote desensitization to dorsal thumb and wrist surgical site x 12 min      Assessment:   Pt is progressing very well overall with strength.  Exhibits improved QuickDash score from 66% to 29%.    Much less reactivity today with gentle resistance.  Good indication of progressive healing. " Patient is making excellent progress towards achieving all goals and would benefit from receiving skilled services to further progress her  and pinch strength required to compelte her ADL/IADL tasks.      Plan:   Continue with ROM, STM, and gentle strength    Goals: Set and discussed today    Active       OT Goals       OT Goal 1 (Progressing)       Start:  10/20/23    Expected End:  01/12/24       Patient will demonstrate age normative pinch strength in order to perform baking tasks without compensatory techniques for task completion         OT Goal 2 (Met)       Start:  10/20/23    Expected End:  12/08/23    Resolved:  12/08/23    Patient will correctly return demonstration of entire HEP to ensure proper carryover to home         OT Goal 3 (Progressing)       Start:  10/20/23    Expected End:  01/12/24       Patient will be able to perform all hair care/hygiene and grooming tasks without right thumb pain             Plan of care was developed with input and agreement by the patient      Jovanna Epstein OT

## 2023-12-28 NOTE — H&P (VIEW-ONLY)
"  Occupational Therapy Treatment      Patient Name: Nereida Conway  MRN: [id number]  Today's Date: 12/28/2023  Time Calculation  Start Time: 1145  Stop Time: 1227  Time Calculation (min): 42 min    Insurance:  Visit number: 5 of 30  Authorization info: required  Insurance Type: [employer]  Authorization certification  -  Start: 12/19/23  End: 1/19/24    Current Problem  1. Wrist pain, acute, right  Follow Up In Occupational Therapy          Referred by: Dr. Munguia  Referred for: R thumb CMC LRTI  DOS: 9-7-23    Fall risk: none  Precautions: WBAT     Hand Dominance: Right      Subjective:  \"I have seen a lot of improvement in my R hand.  Sleeping so much better and wearing the brace less inside the house.\"    Pain:   2/10   Location: R radial wrist snuff box  Description: aching, dull    Objective   RIGHT HAND AROM (Degrees)   MCP PIP DIP DPC   Thumb 0/40      AROM  0/45    AROM    Thumb RABD 35         AROM      Thumb PABD 35         AROM          HAND STRENGTH (Lbs)   R L   Dynamometer      Lateral Pinch 12lb 15   3jaw Pinch 12lb 14      Outcome Measures:         QUICK DASH: 29.55%               Treatment Today  Therapeutic ex:  - graded clothespins to increase pinch strength x 8 min  - translating marbles palm to finger to increase ROM x 5 min  - crumble and spread paper to increase thumb coordination x 5 min  - wrist ABCs with 1lb DB to increase wrist strength x 5min  - red flexbar sup/pront o increase strength of distal wrist 3x12  - red putty finger ADD and MCP flexion to increase strength x 8 min  - reviewed HEP to ensure proper carryover x 5 min    Neuro re-ed:  - AROM tendon gliding of all digits to re-ed proprioception and promote desensitization to dorsal thumb and wrist surgical site x 12 min      Assessment:   Pt is progressing very well overall with strength.  Exhibits improved QuickDash score from 66% to 29%.    Much less reactivity today with gentle resistance.  Good indication of progressive healing. " Patient is making excellent progress towards achieving all goals and would benefit from receiving skilled services to further progress her  and pinch strength required to compelte her ADL/IADL tasks.      Plan:   Continue with ROM, STM, and gentle strength    Goals: Set and discussed today    Active       OT Goals       OT Goal 1 (Progressing)       Start:  10/20/23    Expected End:  01/12/24       Patient will demonstrate age normative pinch strength in order to perform baking tasks without compensatory techniques for task completion         OT Goal 2 (Met)       Start:  10/20/23    Expected End:  12/08/23    Resolved:  12/08/23    Patient will correctly return demonstration of entire HEP to ensure proper carryover to home         OT Goal 3 (Progressing)       Start:  10/20/23    Expected End:  01/12/24       Patient will be able to perform all hair care/hygiene and grooming tasks without right thumb pain             Plan of care was developed with input and agreement by the patient      Jovanna Epstein OT

## 2023-12-29 RX ORDER — CITALOPRAM 40 MG/1
40 TABLET, FILM COATED ORAL DAILY
Qty: 30 TABLET | Refills: 0 | Status: SHIPPED | OUTPATIENT
Start: 2023-12-29 | End: 2024-01-29

## 2024-01-04 ENCOUNTER — APPOINTMENT (OUTPATIENT)
Dept: OCCUPATIONAL THERAPY | Facility: CLINIC | Age: 59
End: 2024-01-04
Payer: COMMERCIAL

## 2024-01-08 ENCOUNTER — APPOINTMENT (OUTPATIENT)
Dept: ORTHOPEDIC SURGERY | Facility: CLINIC | Age: 59
End: 2024-01-08
Payer: COMMERCIAL

## 2024-01-08 ENCOUNTER — APPOINTMENT (OUTPATIENT)
Dept: OCCUPATIONAL THERAPY | Facility: CLINIC | Age: 59
End: 2024-01-08
Payer: COMMERCIAL

## 2024-01-08 DIAGNOSIS — U07.1 COVID-19 VIRUS INFECTION: Primary | ICD-10-CM

## 2024-01-08 RX ORDER — NIRMATRELVIR AND RITONAVIR 300-100 MG
3 KIT ORAL 2 TIMES DAILY
Qty: 30 TABLET | Refills: 0 | Status: SHIPPED | OUTPATIENT
Start: 2024-01-08 | End: 2024-01-13

## 2024-01-15 ENCOUNTER — TREATMENT (OUTPATIENT)
Dept: OCCUPATIONAL THERAPY | Facility: CLINIC | Age: 59
End: 2024-01-15
Payer: COMMERCIAL

## 2024-01-15 ENCOUNTER — OFFICE VISIT (OUTPATIENT)
Dept: ORTHOPEDIC SURGERY | Facility: CLINIC | Age: 59
End: 2024-01-15
Payer: COMMERCIAL

## 2024-01-15 VITALS — BODY MASS INDEX: 26.62 KG/M2 | HEIGHT: 61 IN | WEIGHT: 141 LBS

## 2024-01-15 DIAGNOSIS — M25.531 WRIST PAIN, ACUTE, RIGHT: ICD-10-CM

## 2024-01-15 DIAGNOSIS — E78.00 PURE HYPERCHOLESTEROLEMIA: Primary | ICD-10-CM

## 2024-01-15 DIAGNOSIS — M19.049 CMC ARTHRITIS: Primary | ICD-10-CM

## 2024-01-15 PROCEDURE — 99213 OFFICE O/P EST LOW 20 MIN: CPT | Performed by: ORTHOPAEDIC SURGERY

## 2024-01-15 PROCEDURE — 97110 THERAPEUTIC EXERCISES: CPT | Mod: GO | Performed by: OCCUPATIONAL THERAPIST

## 2024-01-15 PROCEDURE — 1036F TOBACCO NON-USER: CPT | Performed by: ORTHOPAEDIC SURGERY

## 2024-01-15 PROCEDURE — L3924 HFO WITHOUT JOINTS PRE OTS: HCPCS | Performed by: ORTHOPAEDIC SURGERY

## 2024-01-15 PROCEDURE — 20600 DRAIN/INJ JOINT/BURSA W/O US: CPT | Performed by: ORTHOPAEDIC SURGERY

## 2024-01-15 RX ORDER — TRIAMCINOLONE ACETONIDE 40 MG/ML
20 INJECTION, SUSPENSION INTRA-ARTICULAR; INTRAMUSCULAR
Status: COMPLETED | OUTPATIENT
Start: 2024-01-15 | End: 2024-01-15

## 2024-01-15 RX ORDER — TRAMADOL HYDROCHLORIDE 50 MG/1
50 TABLET ORAL 3 TIMES DAILY
Qty: 50 TABLET | Refills: 2 | Status: SHIPPED | OUTPATIENT
Start: 2024-01-15 | End: 2024-02-18

## 2024-01-15 RX ORDER — LIDOCAINE HYDROCHLORIDE 10 MG/ML
0.5 INJECTION INFILTRATION; PERINEURAL
Status: COMPLETED | OUTPATIENT
Start: 2024-01-15 | End: 2024-01-15

## 2024-01-15 RX ADMIN — TRIAMCINOLONE ACETONIDE 20 MG: 40 INJECTION, SUSPENSION INTRA-ARTICULAR; INTRAMUSCULAR at 18:19

## 2024-01-15 RX ADMIN — LIDOCAINE HYDROCHLORIDE 0.5 ML: 10 INJECTION INFILTRATION; PERINEURAL at 18:19

## 2024-01-15 ASSESSMENT — PAIN - FUNCTIONAL ASSESSMENT: PAIN_FUNCTIONAL_ASSESSMENT: NO/DENIES PAIN

## 2024-01-15 NOTE — LETTER
January 15, 2024     Patient: Nereida Conway   YOB: 1965   Date of Visit: 1/15/2024       To Whom It May Concern:    It is my medical opinion that Nereida Conway can return to work on 1/17/2024 part time with limitations involving the use of the right hand for pinch / squeeze / twist / lifting.  She can progressively increase the use of her hand as tolerated.    If you have any questions or concerns, please don't hesitate to call.         Sincerely,        Jurgen Munguia MD    CC: No Recipients

## 2024-01-15 NOTE — PROGRESS NOTES
Nereida returns to clinic today for a follow up visit regarding her bilateral thumbs. She was last seen 11/27/23 for a post operative follow up. We also gave her a left thumb CMC joint injection.      Continues to do well following her arthroplasty in September. She is ready  to return to work now She is here today primarily for her left hand. She is requesting an injection and new comfort cool splints for her hands as her previous no longer have functioning Velcro.       Past medical history, medications, allergies, surgical history and review of systems are reviewed and otherwise unchanged when compared to last visit on 11/27/23.          Examination:     Constitutional: Oriented to person, place, and time.     Appears well-developed and well-nourished.     Head: Normocephalic and atraumatic.     Eyes: Pupils are equal, round, and reactive to light.     Cardiovascular: Intact distal pulses.     Pulmonary/Chest/Breast: Effort normal. No respiratory distress.     Neurological: Alert and oriented to person, place, and time.     Skin: Skin is warm and dry.     Psychiatric: normal mood and affect. Behavior is normal.     Musculoskeletal: Examination of the right hand reveals surgical incision well healed. Excellent digital range of motion. Good early pinch and  strength. Subjective numbness along the ulnar border of the forearm that I am not able to explain following surgery.     Examination of the left hand reveals tenderness to palpation over the thumb CMC joint. Positive CMC grind test         No new imaging taken today.          Impression: Bilateral thumb CMC arthritis         Plan: We did a repeat injection today and provided her with bilateral comfort cool splints. She will continue therapy and follow up as needed.     Risks and benefits of steroid injection discussed with patient. Risks include but are not limited to: pain, infection, allergic reaction to injected medications, changes in the color or  appearance of the skin near the location site and along lymphatic drainage pathway, lack of response, cartilage damage, ligament / tendon rupture, and glycemic control issues in diabetic patients. Patient expresses understanding of risks and elects to proceed. Verbal consent was provided and a time out procedure was performed to confirm the appropriate injection location. Using aseptic technique 20 mg triamcinolone and 0.5 cc 1% lidocaine were injected into the left thumb CMC joint. The patient tolerated the injection well. Post injection instructions were provided.      S Inj/Asp: L thumb CMC on 1/15/2024 6:19 PM  Indications: pain  Details: 25 G needle, dorsal approach  Medications: 20 mg triamcinolone acetonide 40 mg/mL; 0.5 mL lidocaine 10 mg/mL (1 %)  Outcome: tolerated well, no immediate complications  Procedure, treatment alternatives, risks and benefits explained, specific risks discussed. Consent was given by the patient. Immediately prior to procedure a time out was called to verify the correct patient, procedure, equipment, support staff and site/side marked as required. Patient was prepped and draped in the usual sterile fashion.                Jurgen Munguia MD          [facility]     Department of Orthopaedic Surgery     Chief of Hand and Upper Extremity Surgery     [facility]      Scribe Attestation  By signing my name below, Shanta COBURN Scribe   attest that this documentation has been prepared under the direction and in the presence of Jurgen Munguia MD.

## 2024-01-15 NOTE — H&P (VIEW-ONLY)
Nereida returns to clinic today for a follow up visit regarding her bilateral thumbs. She was last seen 11/27/23 for a post operative follow up. We also gave her a left thumb CMC joint injection.      Continues to do well following her arthroplasty in September. She is ready  to return to work now She is here today primarily for her left hand. She is requesting an injection and new comfort cool splints for her hands as her previous no longer have functioning Velcro.       Past medical history, medications, allergies, surgical history and review of systems are reviewed and otherwise unchanged when compared to last visit on 11/27/23.          Examination:     Constitutional: Oriented to person, place, and time.     Appears well-developed and well-nourished.     Head: Normocephalic and atraumatic.     Eyes: Pupils are equal, round, and reactive to light.     Cardiovascular: Intact distal pulses.     Pulmonary/Chest/Breast: Effort normal. No respiratory distress.     Neurological: Alert and oriented to person, place, and time.     Skin: Skin is warm and dry.     Psychiatric: normal mood and affect. Behavior is normal.     Musculoskeletal: Examination of the right hand reveals surgical incision well healed. Excellent digital range of motion. Good early pinch and  strength. Subjective numbness along the ulnar border of the forearm that I am not able to explain following surgery.     Examination of the left hand reveals tenderness to palpation over the thumb CMC joint. Positive CMC grind test         No new imaging taken today.          Impression: Bilateral thumb CMC arthritis         Plan: We did a repeat injection today and provided her with bilateral comfort cool splints. She will continue therapy and follow up as needed.     Risks and benefits of steroid injection discussed with patient. Risks include but are not limited to: pain, infection, allergic reaction to injected medications, changes in the color or  appearance of the skin near the location site and along lymphatic drainage pathway, lack of response, cartilage damage, ligament / tendon rupture, and glycemic control issues in diabetic patients. Patient expresses understanding of risks and elects to proceed. Verbal consent was provided and a time out procedure was performed to confirm the appropriate injection location. Using aseptic technique 20 mg triamcinolone and 0.5 cc 1% lidocaine were injected into the left thumb CMC joint. The patient tolerated the injection well. Post injection instructions were provided.      S Inj/Asp: L thumb CMC on 1/15/2024 6:19 PM  Indications: pain  Details: 25 G needle, dorsal approach  Medications: 20 mg triamcinolone acetonide 40 mg/mL; 0.5 mL lidocaine 10 mg/mL (1 %)  Outcome: tolerated well, no immediate complications  Procedure, treatment alternatives, risks and benefits explained, specific risks discussed. Consent was given by the patient. Immediately prior to procedure a time out was called to verify the correct patient, procedure, equipment, support staff and site/side marked as required. Patient was prepped and draped in the usual sterile fashion.                Jurgen Munguia MD          [facility]     Department of Orthopaedic Surgery     Chief of Hand and Upper Extremity Surgery     [facility]      Scribe Attestation  By signing my name below, Shanta COBURN Scribe   attest that this documentation has been prepared under the direction and in the presence of Jurgen Munguia MD.

## 2024-01-15 NOTE — H&P (VIEW-ONLY)
"  Occupational Therapy Treatment      Patient Name: Nereida Conway  MRN: [id number]  Today's Date: 1/15/2024  Time Calculation  Start Time: 0950  Stop Time: 1032  Time Calculation (min): 42 min    Insurance:  Visit number: 6 of 30  Authorization info: required  Insurance Type: [place]  Authorization certification  -  Start: 12/19/23  End: 1/19/24    Current Problem  1. Wrist pain, acute, right  Follow Up In Occupational Therapy          Referred by: Dr. Munguia  Referred for: R thumb CMC LRTI  DOS: 9-7-23    Fall risk: none  Precautions: WBAT     Hand Dominance: Right      Subjective:  \"I saw my surgeon this morning and he is really pleased with my progress and says to finish out my therapy as long as its needed and he only needs to see me if I have any unforseen issues.\"    Pain:   2/10   Location: R radial wrist snuff box  Description: aching, dull    Objective   RIGHT HAND AROM (Degrees)   MCP PIP DIP DPC   Thumb 0/40      AROM  0/45    AROM    Thumb RABD 35         AROM      Thumb PABD 35         AROM          HAND STRENGTH (Lbs)   R L   Dynamometer      Lateral Pinch 12lb 15   3jaw Pinch 12lb 14      Outcome Measures:         QUICK DASH: 29.55%               Treatment Today  Therapeutic ex:  - graded clothespins to increase pinch strength x 8 min  - wrist ABCs with 1lb DB to increase wrist strength x 5min  - 1ln hammer sup/pron to increase wrist stabilization strength  - red putty finger ADD and MCP flexion to increase strength x 8 min  - reviewed HEP to ensure proper carryover     Neuro re-ed:  - AROM tendon gliding of all digits to re-ed proprioception and promote desensitization to dorsal thumb and wrist surgical site x 12 min      Assessment:   Pt is progressing very well overall with strength.  Tolerated upgraded black resisted clothespins which are the hardest level of resistance without pain.  Also tolerated wrist stabilization strength well today with hammer and without pain. Patient is making excellent " progress towards achieving all goals and would benefit from receiving skilled services to further progress her  and pinch strength required to compelte her ADL/IADL tasks.      Plan:   Continue with ROM, STM, and gentle strength    Goals: Set and discussed today    Active       OT Goals       OT Goal 1 (Progressing)       Start:  10/20/23    Expected End:  01/12/24       Patient will demonstrate age normative pinch strength in order to perform baking tasks without compensatory techniques for task completion         OT Goal 2 (Met)       Start:  10/20/23    Expected End:  12/08/23    Resolved:  12/08/23    Patient will correctly return demonstration of entire HEP to ensure proper carryover to home         OT Goal 3 (Progressing)       Start:  10/20/23    Expected End:  01/12/24       Patient will be able to perform all hair care/hygiene and grooming tasks without right thumb pain             Plan of care was developed with input and agreement by the patient      Jovanna Epstein OT

## 2024-01-15 NOTE — PROGRESS NOTES
"  Occupational Therapy Treatment      Patient Name: Nereida Conway  MRN: [id number]  Today's Date: 1/15/2024  Time Calculation  Start Time: 0950  Stop Time: 1032  Time Calculation (min): 42 min    Insurance:  Visit number: 6 of 30  Authorization info: required  Insurance Type: [employer]  Authorization certification  -  Start: 12/19/23  End: 1/19/24    Current Problem  1. Wrist pain, acute, right  Follow Up In Occupational Therapy          Referred by: Dr. Munguia  Referred for: R thumb [facility] LRTI  DOS: 9-7-23    Fall risk: none  Precautions: WBAT     Hand Dominance: Right      Subjective:  \"I saw my surgeon this morning and he is really pleased with my progress and says to finish out my therapy as long as its needed and he only needs to see me if I have any unforseen issues.\"    Pain:   2/10   Location: R radial wrist snuff box  Description: aching, dull    Objective   RIGHT HAND AROM (Degrees)   MCP PIP DIP DPC   Thumb 0/40      AROM  0/45    AROM    Thumb RABD 35         AROM      Thumb PABD 35         AROM          HAND STRENGTH (Lbs)   R L   Dynamometer      Lateral Pinch 12lb 15   3jaw Pinch 12lb 14      Outcome Measures:         QUICK DASH: 29.55%               Treatment Today  Therapeutic ex:  - graded clothespins to increase pinch strength x 8 min  - wrist ABCs with 1lb DB to increase wrist strength x 5min  - 1ln hammer sup/pron to increase wrist stabilization strength  - red putty finger ADD and MCP flexion to increase strength x 8 min  - reviewed HEP to ensure proper carryover     Neuro re-ed:  - AROM tendon gliding of all digits to re-ed proprioception and promote desensitization to dorsal thumb and wrist surgical site x 12 min      Assessment:   Pt is progressing very well overall with strength.  Tolerated upgraded black resisted clothespins which are the hardest level of resistance without pain.  Also tolerated wrist stabilization strength well today with hammer and without pain. Patient is making excellent " progress towards achieving all goals and would benefit from receiving skilled services to further progress her  and pinch strength required to compelte her ADL/IADL tasks.      Plan:   Continue with ROM, STM, and gentle strength    Goals: Set and discussed today    Active       OT Goals       OT Goal 1 (Progressing)       Start:  10/20/23    Expected End:  01/12/24       Patient will demonstrate age normative pinch strength in order to perform baking tasks without compensatory techniques for task completion         OT Goal 2 (Met)       Start:  10/20/23    Expected End:  12/08/23    Resolved:  12/08/23    Patient will correctly return demonstration of entire HEP to ensure proper carryover to home         OT Goal 3 (Progressing)       Start:  10/20/23    Expected End:  01/12/24       Patient will be able to perform all hair care/hygiene and grooming tasks without right thumb pain             Plan of care was developed with input and agreement by the patient      Jovanna Epstein OT

## 2024-01-16 ENCOUNTER — TELEPHONE (OUTPATIENT)
Dept: OPHTHALMOLOGY | Facility: CLINIC | Age: 59
End: 2024-01-16
Payer: COMMERCIAL

## 2024-01-16 RX ORDER — ATORVASTATIN CALCIUM 80 MG/1
80 TABLET, FILM COATED ORAL DAILY
Qty: 90 TABLET | Refills: 0 | Status: SHIPPED | OUTPATIENT
Start: 2024-01-16 | End: 2024-04-25

## 2024-01-16 NOTE — TELEPHONE ENCOUNTER
SPOKE WITH PT. CHIP SENT TO Monae LOWERY 1/26/24 @ 10:00-IN OFFICE AND 1/31/24 @ 1:30-IN OFFICE-[facility]

## 2024-01-18 ENCOUNTER — PRE-ADMISSION TESTING (OUTPATIENT)
Dept: PREADMISSION TESTING | Facility: HOSPITAL | Age: 59
End: 2024-01-18
Payer: COMMERCIAL

## 2024-01-18 VITALS
TEMPERATURE: 96.8 F | OXYGEN SATURATION: 100 % | HEIGHT: 62 IN | BODY MASS INDEX: 25.72 KG/M2 | HEART RATE: 70 BPM | DIASTOLIC BLOOD PRESSURE: 84 MMHG | SYSTOLIC BLOOD PRESSURE: 131 MMHG | WEIGHT: 139.77 LBS

## 2024-01-18 DIAGNOSIS — H25.811 COMBINED FORMS OF AGE-RELATED CATARACT OF RIGHT EYE: Primary | ICD-10-CM

## 2024-01-18 PROCEDURE — 99203 OFFICE O/P NEW LOW 30 MIN: CPT | Performed by: PHYSICIAN ASSISTANT

## 2024-01-18 ASSESSMENT — CHADS2 SCORE
PRIOR STROKE OR TIA OR THROMBOEMBOLISM: NO
AGE GREATER THAN OR EQUAL TO 75: NO
DIABETES: NO
CHADS2 SCORE: 1
HYPERTENSION: YES
CHF: NO

## 2024-01-18 ASSESSMENT — LIFESTYLE VARIABLES: SMOKING_STATUS: NONSMOKER

## 2024-01-18 ASSESSMENT — PAIN - FUNCTIONAL ASSESSMENT: PAIN_FUNCTIONAL_ASSESSMENT: 0-10

## 2024-01-18 ASSESSMENT — PAIN SCALES - GENERAL: PAINLEVEL_OUTOF10: 0 - NO PAIN

## 2024-01-18 NOTE — CPM/PAT H&P
CPM/PAT Evaluation       Name: Nereida Conway (Nereida Conway)  /Age: 1965/58 y.o.     In-Person       Chief Complaint: Cataract right eye  HPI: 58-year-old female complains of blurry vision right eye for many years.  Patient states that vision has gotten progressively worse over the past few months.  Patient states she is now avoiding night driving.  Patient has a history of hypertension.  Patient states she tested positive for COVID New Year's Chanda.  She received Paxlovid.  She did not is asymptomatic.  She is just under our 30-day window.  Discussed with Dr. Ashby and Dr. Hernández from anesthesia who are both okay to proceed.  Patient is scheduled for cataract extraction with intraocular lens implant RIGHT eye 2024      Past Medical History:   Diagnosis Date    Alopecia     Encounter for immunization 2016    Need for Tdap vaccination    Personal history of other diseases of the respiratory system 2016    History of sinusitis    Personal history of other diseases of the respiratory system 2016    History of laryngitis    Personal history of other diseases of the respiratory system 2016    History of acute bronchitis    Personal history of other diseases of the respiratory system 2016    History of acute sinusitis    Personal history of other specified conditions 2016    History of abdominal pain    Personal history of other specified conditions 2016    History of diarrhea    Right lower quadrant pain 2016    RLQ abdominal pain       Past Surgical History:   Procedure Laterality Date     SECTION, LOW TRANSVERSE      DILATION AND CURETTAGE OF UTERUS  10/09/2014    LAPAROSCOPY DIAGNOSTIC / BIOPSY / ASPIRATION / LYSIS  10/09/2014    Laparoscopy (Diagnostic)    ROTATOR CUFF REPAIR      TONSILLECTOMY  10/09/2014    Tonsillectomy    TOTAL HIP ARTHROPLASTY         Patient  has no history on file for sexual activity.    No family history on  file.    Allergies   Allergen Reactions    Duloxetine Other and Hallucinations     severe diaphoresis    Gabapentin Hallucinations    Indomethacin Other     severe gi upset    Prednisone Swelling    Sulfamethoxazole Rash       Current Outpatient Medications   Medication Instructions    acetaminophen (TYLENOL EXTRA STRENGTH) 1,000 mg, oral, Every 6 hours PRN    amitriptyline (ELAVIL) 50 mg, oral, Nightly    atorvastatin (LIPITOR) 80 mg, oral, Daily    cholecalciferol (Vitamin D-3) 50 mcg (2,000 unit) capsule take one capsule by mouth once a day    [START ON 1/24/2024] ciprofloxacin (Ciloxan) 0.3 % ophthalmic solution 1 drop, Right Eye, 4 times daily, STARTING THE DAY BEFORE SURGERY    citalopram (CELEXA) 40 mg, oral, Daily    lisinopriL-hydrochlorothiazide 20-12.5 mg tablet 1 tablet, oral, Daily    [START ON 1/26/2024] prednisoLONE acetate (Pred-Forte) 1 % ophthalmic suspension 1 drop, Right Eye, 4 times daily, STARTING THE DAY AFTER SURGERY    propranolol LA (INDERAL LA) 120 mg, oral, Daily    rifAXIMin (XIFAXAN) 550 mg, oral, 3 times daily    rizatriptan (Maxalt) 10 mg tablet TAKE 1 TABLET BY MOUTH AT ONSET OF HEADACHE. MAY REPEAT EVERY 2 HOURS AS NEEDED. MAXIMUM OF 3 TABLETS IN 24 HOURS    topiramate (TOPAMAX) 25 mg, oral, 2 times daily, 1 TABLET EVERY MORNING AND 2 TABLETS AT BEDTIME    traMADol (ULTRAM) 50 mg, oral, 3 times daily    valACYclovir (Valtrex) 500 mg tablet TAKE ONE TABLET BY MOUTH EVERY DAY     Review of Systems   HENT:          Patient wears glasses   All other systems reviewed and are negative.       Physical Exam  Vitals reviewed. Physical exam within normal limits.   Constitutional:       Appearance: Normal appearance.   HENT:      Head: Normocephalic and atraumatic.      Mouth/Throat:      Mouth: Mucous membranes are moist.   Eyes:      Extraocular Movements: Extraocular movements intact.      Pupils: Pupils are equal, round, and reactive to light.   Cardiovascular:      Rate and Rhythm: Normal  "rate and regular rhythm.      Pulses: Normal pulses.      Heart sounds: Normal heart sounds.   Pulmonary:      Effort: Pulmonary effort is normal.      Breath sounds: Normal breath sounds.   Abdominal:      General: Bowel sounds are normal.      Palpations: Abdomen is soft.   Musculoskeletal:         General: Normal range of motion.      Cervical back: Normal range of motion.      Comments: Right thumb/wrist brace intact   Skin:     General: Skin is warm and dry.   Neurological:      General: No focal deficit present.      Mental Status: She is alert and oriented to person, place, and time.   Psychiatric:         Mood and Affect: Mood normal.         Thought Content: Thought content normal.         Judgment: Judgment normal.          PAT AIRWAY:   Airway:     Mallampati::  I    Neck ROM::  Full      Vitals  Heart Rate:  [70]   Temp:  [36 °C (96.8 °F)]   BP: (131)/(84)   Height:  [157.5 cm (5' 2\")]   Weight:  [63.4 kg (139 lb 12.4 oz)]   SpO2:  [100 %]        DASI Risk Score    No data to display       Caprini DVT Assessment    No data to display       Modified Frailty Index    No data to display       CHADS2 Stroke Risk  Current as of 11 minutes ago        N/A 3 - 100%: High Risk   2 - 3%: Medium Risk   0 - 2%: Low Risk     Last Change: N/A          This score determines the patient's risk of having a stroke if the patient has atrial fibrillation.        This score is not applicable to this patient. Components are not calculated.          Revised Cardiac Risk Index    No data to display       Apfel Simplified Score    No data to display       Risk Analysis Index Results This Encounter    No data found in the last 1 encounters.       Stop Bang Score      Flowsheet Row Most Recent Value   Do you snore loudly? 0   Do you often feel tired or fatigued after your sleep? 0   Has anyone ever observed you stop breathing in your sleep? 0   Do you have or are you being treated for high blood pressure? 1   Recent BMI " (Calculated) 25.6   Is BMI greater than 35 kg/m2? 0=No   Age older than 50 years old? 1=Yes   Is your neck circumference greater than 17 inches (Male) or 16 inches (Female)? 0   Gender - Male 0=No   STOP-BANG Total Score 2            Assessment and Plan:   1.  Combined forms age-related cataract right eye   Plan: Cataract extraction with intraocular lens implant right eye             01/25/2024  2.  Hypertension  3.  Migraine headaches  4.  Irritable bowel syndrome  5.  Pulmonary nodule  6.  Arthritis  7.  Anxiety and depression  8.  CHADS2 score 1= 2.8%  9.  ASA 2  10.  Patient COVID-positive 12/31/2023 okay to proceed with procedure per Dr. Hernández and anesthesia and Dr. Ashby

## 2024-01-18 NOTE — PREPROCEDURE INSTRUCTIONS
Medication List            Accurate as of January 18, 2024  8:44 AM. Always use your most recent med list.                amitriptyline 50 mg tablet  Commonly known as: Elavil  Take 1 tablet (50 mg) by mouth once daily at bedtime.     atorvastatin 80 mg tablet  Commonly known as: Lipitor  TAKE ONE TABLET BY MOUTH EVERY DAY     cholecalciferol 50 mcg (2,000 unit) capsule  Commonly known as: Vitamin D-3  take one capsule by mouth once a day  Medication Adjustments for Surgery: Stop 7 days before surgery     ciprofloxacin 0.3 % ophthalmic solution  Commonly known as: Ciloxan  Administer 1 drop into the right eye 4 times a day. STARTING THE DAY BEFORE SURGERY Do not start before January 24, 2024.  Start taking on: January 24, 2024     citalopram 40 mg tablet  Commonly known as: CeleXA  TAKE ONE TABLET BY MOUTH EVERY DAY     lisinopriL-hydrochlorothiazide 20-12.5 mg tablet  Take 1 tablet by mouth once daily.  Notes to patient: DO NOT TAKE MORNING OF SURGERY     prednisoLONE acetate 1 % ophthalmic suspension  Commonly known as: Pred-Forte  Administer 1 drop into the right eye 4 times a day. STARTING THE DAY AFTER SURGERY Do not start before January 26, 2024.  Start taking on: January 26, 2024     propranolol  mg 24 hr capsule  Commonly known as: Inderal LA  Take 1 capsule (120 mg) by mouth once daily.     rifAXIMin 550 mg tablet  Commonly known as: Xifaxan  Take 1 tablet (550 mg) by mouth 3 times a day.     rizatriptan 10 mg tablet  Commonly known as: Maxalt  TAKE 1 TABLET BY MOUTH AT ONSET OF HEADACHE. MAY REPEAT EVERY 2 HOURS AS NEEDED. MAXIMUM OF 3 TABLETS IN 24 HOURS     topiramate 25 mg tablet  Commonly known as: Topamax  Medication Adjustments for Surgery: Take morning of surgery with sip of water, no other fluids     traMADol 50 mg tablet  Commonly known as: Ultram  Take 1 tablet (50 mg) by mouth 3 times a day for 100 doses.     Tylenol Extra Strength 500 mg tablet  Generic drug: acetaminophen  Medication  Adjustments for Surgery: Take morning of surgery with sip of water, no other fluids  Notes to patient: MAY TAKE MORNING OF SURGERY OF NEEDED     valACYclovir 500 mg tablet  Commonly known as: Valtrex  TAKE ONE TABLET BY MOUTH EVERY DAY                 MAY TAKE EVENING MEDICATIONS DAY BEFORE SURGERY             NPO Instructions:    Do not eat any food after midnight the night before your surgery/procedure.    Additional Instructions:     Day of Surgery:  Review your medication instructions, take indicated medications  Wear  comfortable loose fitting clothing    PAT DISCHARGE INSTRUCTIONS    Please call the Same Day Surgery (SDS) Department of the hospital where your procedure will be performed after 2:00 PM the day before your surgery. If you are scheduled on a Monday, or a Tuesday following a Monday holiday, you will need to call on the last business day prior to your surgery.    [facility]  [id number] [place] [address], [address] [phone]    [facility]  [address] [place] [address] [phone]    [facility]  [address] [address] [place] [address]  [phone]    Please let your surgeon know if:      You develop any open sores, shingles, burning or painful urination as these may increase your risk of an infection.   You no longer wish to have the surgery.   Any other personal circumstances change that may lead to the need to cancel or defer this surgery-such as being sick or getting admitted to any hospital within one week of your planned procedure.    Your contact details change, such as a change of address or phone number.    Starting now:     Please DO NOT drink alcohol or smoke for 24 hours before surgery. It is well known that quitting smoking can make a huge difference to your health and recovery from surgery. The longer you abstain from smoking, the better your chances of a healthy  recovery. If you need help with quitting, call 2-322-QUIT-NOW to be connected to a trained counselor who will discuss the best methods to help you quit.     Before your surgery:    Please stop all supplements 7 days prior to surgery. Or as directed by your surgeon.   Please stop taking NSAID pain medicine such as Advil and Motrin 7 days before surgery.    If you develop any fever, cough, cold, rashes, cuts, scratches, scrapes, urinary symptoms or infection anywhere on your body (including teeth and gums) prior to surgery, please call your surgeon’s office as soon as possible. This may require treatment to reduce the chance of cancellation on the day of surgery.    The day before your surgery:   DIET- Do not eat any food after MIDNIGHT. May have 10 ounces of CLEAR LIQUIDS until TWO HOURS before your arrival time. This includes water, black tea or coffee (no milk ir cream), apple juice and electrolyte drinks (Gatorade). May chew gum until TWO hours before your surgery time.   Get a good night’s rest.  Use the special soap for bathing if you have been instructed to use one.    Scheduled surgery times may change and you will be notified if this occurs - please check your personal voicemail for any updates.     On the morning of surgery:   Wear comfortable, loose fitting clothes which open in the front. Please do not wear moisturizers, creams, lotions, makeup or perfume.    Please bring with you to surgery:   Photo ID and insurance card   Current list of medicines and allergies   Pacemaker/ Defibrillator/Heart stent cards   CPAP machine and mask    Slings/ splints/ crutches   A copy of your complete advanced directive/DHPOA.    Please do NOT bring with you to surgery:   All jewelry and valuables should be left at home.   Prosthetic devices such as contact lenses, hearing aids, dentures, eyelash extensions, hairpins and body piercings must be removed prior to going in to the surgical suite.    After outpatient surgery:    A responsible adult MUST accompany you at the time of discharge and stay with you for 24 hours after your surgery. You may NOT drive yourself home after surgery.    Do not drive, operate machinery, make critical decisions or do activities that require co-ordination or balance until after a night’s sleep.   Do not drink alcoholic beverages for 24 hours.   Instructions for resuming your medications will be provided by your surgeon.    CALL YOUR DOCTOR AFTER SURGERY IF YOU HAVE:     Chills and/or a fever of 101° F or higher.    Redness, swelling, pus or drainage from your surgical wound or a bad smell from the wound.    Lightheadedness, fainting or confusion.    Persistent vomiting (throwing up) and are not able to eat or drink for 12 hours.    Three or more loose, watery bowel movements in 24 hours (diarrhea).   Difficulty or pain while urinating( after non-urological surgery)    Pain and swelling in your legs, especially if it is only on one side.    Difficulty breathing or are breathing faster than normal.    Any new concerning symptoms.

## 2024-01-22 ENCOUNTER — APPOINTMENT (OUTPATIENT)
Dept: OCCUPATIONAL THERAPY | Facility: CLINIC | Age: 59
End: 2024-01-22
Payer: COMMERCIAL

## 2024-01-24 ENCOUNTER — PREP FOR PROCEDURE (OUTPATIENT)
Dept: OPHTHALMOLOGY | Facility: CLINIC | Age: 59
End: 2024-01-24
Payer: COMMERCIAL

## 2024-01-24 RX ORDER — SODIUM CHLORIDE, SODIUM LACTATE, POTASSIUM CHLORIDE, CALCIUM CHLORIDE 600; 310; 30; 20 MG/100ML; MG/100ML; MG/100ML; MG/100ML
50 INJECTION, SOLUTION INTRAVENOUS CONTINUOUS
Status: CANCELLED | OUTPATIENT
Start: 2024-01-25

## 2024-01-24 RX ORDER — FLURBIPROFEN SODIUM 0.3 MG/ML
1 SOLUTION/ DROPS OPHTHALMIC
Status: CANCELLED | OUTPATIENT
Start: 2024-01-24 | End: 2024-01-24

## 2024-01-24 RX ORDER — CYCLOPENTOLATE HYDROCHLORIDE 10 MG/ML
1 SOLUTION/ DROPS OPHTHALMIC
Status: CANCELLED | OUTPATIENT
Start: 2024-01-24 | End: 2024-01-24

## 2024-01-24 RX ORDER — PHENYLEPHRINE HYDROCHLORIDE 25 MG/ML
1 SOLUTION/ DROPS OPHTHALMIC
Status: CANCELLED | OUTPATIENT
Start: 2024-01-24 | End: 2024-01-24

## 2024-01-25 ENCOUNTER — ANESTHESIA (OUTPATIENT)
Dept: OPERATING ROOM | Facility: HOSPITAL | Age: 59
End: 2024-01-25
Payer: COMMERCIAL

## 2024-01-25 ENCOUNTER — HOSPITAL ENCOUNTER (OUTPATIENT)
Facility: HOSPITAL | Age: 59
Setting detail: OUTPATIENT SURGERY
Discharge: HOME | End: 2024-01-25
Attending: OPHTHALMOLOGY | Admitting: OPHTHALMOLOGY
Payer: COMMERCIAL

## 2024-01-25 ENCOUNTER — ANESTHESIA EVENT (OUTPATIENT)
Dept: OPERATING ROOM | Facility: HOSPITAL | Age: 59
End: 2024-01-25
Payer: COMMERCIAL

## 2024-01-25 VITALS
OXYGEN SATURATION: 99 % | HEART RATE: 57 BPM | SYSTOLIC BLOOD PRESSURE: 89 MMHG | DIASTOLIC BLOOD PRESSURE: 43 MMHG | TEMPERATURE: 97.9 F | RESPIRATION RATE: 16 BRPM

## 2024-01-25 DIAGNOSIS — H25.811 COMBINED FORMS OF AGE-RELATED CATARACT OF RIGHT EYE: Primary | ICD-10-CM

## 2024-01-25 PROCEDURE — C1780 LENS, INTRAOCULAR (NEW TECH): HCPCS | Performed by: OPHTHALMOLOGY

## 2024-01-25 PROCEDURE — 3600000008 HC OR TIME - EACH INCREMENTAL 1 MINUTE - PROCEDURE LEVEL THREE: Performed by: OPHTHALMOLOGY

## 2024-01-25 PROCEDURE — 2760000001 HC OR 276 NO HCPCS: Performed by: OPHTHALMOLOGY

## 2024-01-25 PROCEDURE — 7100000010 HC PHASE TWO TIME - EACH INCREMENTAL 1 MINUTE: Performed by: OPHTHALMOLOGY

## 2024-01-25 PROCEDURE — 66984 XCAPSL CTRC RMVL W/O ECP: CPT | Performed by: OPHTHALMOLOGY

## 2024-01-25 PROCEDURE — 3600000003 HC OR TIME - INITIAL BASE CHARGE - PROCEDURE LEVEL THREE: Performed by: OPHTHALMOLOGY

## 2024-01-25 PROCEDURE — 2500000004 HC RX 250 GENERAL PHARMACY W/ HCPCS (ALT 636 FOR OP/ED): Performed by: ANESTHESIOLOGIST ASSISTANT

## 2024-01-25 PROCEDURE — 7100000009 HC PHASE TWO TIME - INITIAL BASE CHARGE: Performed by: OPHTHALMOLOGY

## 2024-01-25 PROCEDURE — A4649 SURGICAL SUPPLIES: HCPCS | Performed by: OPHTHALMOLOGY

## 2024-01-25 PROCEDURE — 3700000001 HC GENERAL ANESTHESIA TIME - INITIAL BASE CHARGE: Performed by: OPHTHALMOLOGY

## 2024-01-25 PROCEDURE — 3700000002 HC GENERAL ANESTHESIA TIME - EACH INCREMENTAL 1 MINUTE: Performed by: OPHTHALMOLOGY

## 2024-01-25 PROCEDURE — 2500000005 HC RX 250 GENERAL PHARMACY W/O HCPCS: Performed by: OPHTHALMOLOGY

## 2024-01-25 PROCEDURE — A66984 PR REMV CATARACT EXTRACAP,INSERT LENS: Performed by: ANESTHESIOLOGIST ASSISTANT

## 2024-01-25 PROCEDURE — 2720000007 HC OR 272 NO HCPCS: Performed by: OPHTHALMOLOGY

## 2024-01-25 PROCEDURE — A66984 PR REMV CATARACT EXTRACAP,INSERT LENS: Performed by: STUDENT IN AN ORGANIZED HEALTH CARE EDUCATION/TRAINING PROGRAM

## 2024-01-25 PROCEDURE — 2500000004 HC RX 250 GENERAL PHARMACY W/ HCPCS (ALT 636 FOR OP/ED): Performed by: OPHTHALMOLOGY

## 2024-01-25 PROCEDURE — 2500000001 HC RX 250 WO HCPCS SELF ADMINISTERED DRUGS (ALT 637 FOR MEDICARE OP): Performed by: OPHTHALMOLOGY

## 2024-01-25 DEVICE — ACRYSOF(R) IQ ASPHERIC IOL SP ACRYLIC FOLDABLELENS WULTRASERT(TM) DELIVERY SYSTEM UV WBLUE LIGHT FILTER. 13.0MM LENGTH 6.0MM ANTERIORASYMMETRIC BICONVEX OPTIC PLANAR HAPTICS.
Type: IMPLANTABLE DEVICE | Site: EYE | Status: FUNCTIONAL
Brand: ACRYSOF ULTRASERT

## 2024-01-25 RX ORDER — SODIUM CHLORIDE, SODIUM LACTATE, POTASSIUM CHLORIDE, CALCIUM CHLORIDE 600; 310; 30; 20 MG/100ML; MG/100ML; MG/100ML; MG/100ML
50 INJECTION, SOLUTION INTRAVENOUS CONTINUOUS
Status: DISCONTINUED | OUTPATIENT
Start: 2024-01-25 | End: 2024-01-25 | Stop reason: HOSPADM

## 2024-01-25 RX ORDER — EPINEPHRINE 1 MG/ML
INJECTION, SOLUTION, CONCENTRATE INTRAVENOUS AS NEEDED
Status: DISCONTINUED | OUTPATIENT
Start: 2024-01-25 | End: 2024-01-25 | Stop reason: HOSPADM

## 2024-01-25 RX ORDER — MIDAZOLAM HYDROCHLORIDE 1 MG/ML
INJECTION, SOLUTION INTRAMUSCULAR; INTRAVENOUS AS NEEDED
Status: DISCONTINUED | OUTPATIENT
Start: 2024-01-25 | End: 2024-01-25

## 2024-01-25 RX ORDER — FLURBIPROFEN SODIUM 0.3 MG/ML
1 SOLUTION/ DROPS OPHTHALMIC
Status: COMPLETED | OUTPATIENT
Start: 2024-01-25 | End: 2024-01-25

## 2024-01-25 RX ORDER — PHENYLEPHRINE HYDROCHLORIDE 25 MG/ML
1 SOLUTION/ DROPS OPHTHALMIC
Status: COMPLETED | OUTPATIENT
Start: 2024-01-25 | End: 2024-01-25

## 2024-01-25 RX ORDER — FENTANYL CITRATE 50 UG/ML
INJECTION, SOLUTION INTRAMUSCULAR; INTRAVENOUS AS NEEDED
Status: DISCONTINUED | OUTPATIENT
Start: 2024-01-25 | End: 2024-01-25

## 2024-01-25 RX ORDER — CYCLOPENTOLATE HYDROCHLORIDE 10 MG/ML
1 SOLUTION/ DROPS OPHTHALMIC
Status: COMPLETED | OUTPATIENT
Start: 2024-01-25 | End: 2024-01-25

## 2024-01-25 RX ORDER — PROPOFOL 10 MG/ML
INJECTION, EMULSION INTRAVENOUS AS NEEDED
Status: DISCONTINUED | OUTPATIENT
Start: 2024-01-25 | End: 2024-01-25

## 2024-01-25 RX ADMIN — CYCLOPENTOLATE HYDROCHLORIDE 1 DROP: 10 SOLUTION/ DROPS OPHTHALMIC at 09:04

## 2024-01-25 RX ADMIN — FENTANYL CITRATE 50 MCG: 0.05 INJECTION, SOLUTION INTRAMUSCULAR; INTRAVENOUS at 10:49

## 2024-01-25 RX ADMIN — PHENYLEPHRINE HYDROCHLORIDE 1 DROP: 25 SOLUTION/ DROPS OPHTHALMIC at 09:35

## 2024-01-25 RX ADMIN — FLURBIPROFEN SODIUM 1 DROP: 0.3 SOLUTION/ DROPS OPHTHALMIC at 09:35

## 2024-01-25 RX ADMIN — CYCLOPENTOLATE HYDROCHLORIDE 1 DROP: 10 SOLUTION/ DROPS OPHTHALMIC at 09:18

## 2024-01-25 RX ADMIN — PROPOFOL 80 MG: 10 INJECTION, EMULSION INTRAVENOUS at 10:27

## 2024-01-25 RX ADMIN — MIDAZOLAM 2 MG: 1 INJECTION INTRAMUSCULAR; INTRAVENOUS at 10:08

## 2024-01-25 RX ADMIN — PHENYLEPHRINE HYDROCHLORIDE 1 DROP: 25 SOLUTION/ DROPS OPHTHALMIC at 09:06

## 2024-01-25 RX ADMIN — FLURBIPROFEN SODIUM 1 DROP: 0.3 SOLUTION/ DROPS OPHTHALMIC at 09:19

## 2024-01-25 RX ADMIN — SODIUM CHLORIDE, SODIUM LACTATE, POTASSIUM CHLORIDE, AND CALCIUM CHLORIDE 50 ML/HR: 600; 310; 30; 20 INJECTION, SOLUTION INTRAVENOUS at 09:50

## 2024-01-25 RX ADMIN — CYCLOPENTOLATE HYDROCHLORIDE 1 DROP: 10 SOLUTION/ DROPS OPHTHALMIC at 09:35

## 2024-01-25 RX ADMIN — FLURBIPROFEN SODIUM 1 DROP: 0.3 SOLUTION/ DROPS OPHTHALMIC at 09:06

## 2024-01-25 RX ADMIN — PHENYLEPHRINE HYDROCHLORIDE 1 DROP: 25 SOLUTION/ DROPS OPHTHALMIC at 09:19

## 2024-01-25 SDOH — HEALTH STABILITY: MENTAL HEALTH: CURRENT SMOKER: 0

## 2024-01-25 ASSESSMENT — PAIN SCALES - GENERAL
PAINLEVEL_OUTOF10: 0 - NO PAIN

## 2024-01-25 ASSESSMENT — PAIN - FUNCTIONAL ASSESSMENT
PAIN_FUNCTIONAL_ASSESSMENT: 0-10

## 2024-01-25 NOTE — DISCHARGE INSTRUCTIONS
Dr. Angelito Silva [facility]  [address] [place] [address]    Discharge Instructions for Cataract Extraction/Intraocular Lens Implant    No bending or heavy lifting.  DO NOT lie on the side with the bandage/shield.  DO NOT disturb your eye bandage/shield.  DO NOT use Aspirin or Ibuprofen unless instructed by your doctor.   No driving until approved by your doctor.   Keep your follow-up appointments with your doctor.  Resume preoperative medications.  Resume diet as tolerated.     Call your doctor if:    You have a severe headache.  You have sudden sharp pain in your eye that doesn't go away.  You have nausea or vomiting.

## 2024-01-25 NOTE — ANESTHESIA POSTPROCEDURE EVALUATION
Patient: Nereida Conway    Procedure Summary       Date: 01/25/24 Room / Location: [facility] OR 03 / Virtual [facility] OR    Anesthesia Start: 1008 Anesthesia Stop: 1126    Procedure: Phacoemulsification Cataract with Insertion Intraocular Lens (Right: Eye) Diagnosis:       Combined forms of age-related cataract of right eye      (Combined forms of age-related cataract of right eye [H25.811])    Surgeons: Angelito Ashby MD Responsible Provider: Ricardo Peng DO    Anesthesia Type: MAC ASA Status: 2            Anesthesia Type: MAC    Vitals Value Taken Time   BP 97/71 01/25/24 1129   Temp 36 °C (96.8 °F) 01/25/24 1129   Pulse 60 01/25/24 1129   Resp 16 01/25/24 1129   SpO2 97 % 01/25/24 1129       Anesthesia Post Evaluation    Patient location during evaluation: bedside  Patient participation: complete - patient participated  Level of consciousness: awake and alert  Pain management: adequate  Multimodal analgesia pain management approach  Airway patency: patent  Two or more strategies used to mitigate risk of obstructive sleep apnea  Cardiovascular status: acceptable  Respiratory status: acceptable  Hydration status: acceptable  Postoperative Nausea and Vomiting: none        There were no known notable events for this encounter.

## 2024-01-25 NOTE — OP NOTE
Operative Note    Preoperative Diagnosis: Visually significant cataract right eye.    Postoperative Diagnosis: Same    Procedure Date/Time: 01/25/24  11:39 AM     Procedure Performed: Procedure(s):  Phacoemulsification Cataract with Insertion Intraocular Lens     Surgeon: Angelito Ashby MD     Assisted By: None    Anesthesia: MAC    Estimated Blood Loss: None     Specimen (s) Removed: None     Drain(s): None     Implant(s): Eric [id number] no intra-ocular lens implant    Complications: None     Indications: The patient has impairment of visual function due to cataracts and decreased ability to carry out activities of daily living including but not limited to reading, watching television, driving, or meeting occupational or vocational expectations. The patient has a best corrected visual acuity of 20/50 or worse at distance or near or additional test showing 1 of the following: consensual light testing decreases visual acuity by 2 lines or glare testing decreases visual acuity by 2 lines.  The patient has determined that they are no longer able to function adequately with the current visual function.  Other eye diseases including but not limited to macular degeneration or diabetic retinopathy, have been ruled out as the primary cause of decreased visual function.  Significant improvement in visual function can be expected as a result of cataract extraction.  The patient has been educated about the risks and benefits of cataract surgery and the alternatives to surgery.  The patient has undergone an appropriate pre operative ophthalmologic evaluation that generally includes a comprehensive ophthalmologic exam and ophthalmic biometry.    Description of Procedure: After informed consent had previously been obtained, the patient was brought back to the operating room and placed on the operating room table.  Monitored anesthesia care was administered.  Prior to this, the patient's operative eye had been dilated with 1%  Mydriacyl, 2% Cyclogyl or cyclopentolate, 2.5% Adrian-Synephrine and ketorolac.  At this point, a 1:1 mixture of 0.5% Marcaine and 2% lidocaine with epinephrine was given.  Approximately 3 mL was given in a retroauricular fashion using a short 25-gauge needle for a facial nerve block.  Then, approximately 2 to 3 ml of the same mixture was given in a peribulbar fashion inferotemporally below the globe using again a short 25-gauge needle.    The patient was then prepped and draped in the usual sterile fashion.  Attention was directed to the patient's operative eye where a lid speculum was placed.  A conjunctival flap was dissected using Sana scissors and a 0.12 mm forceps.  The scleral bed was then cauterized and marked.  Appropriate hemostasis was achieved using electrocautery.  A bent crescent knife was then used to make a scleral incision approximately 0.75 mm to 1 mm back from the corneoscleral limbus.  The incision was a half-thickness incision and was approximately 2.5 to 3 mm in length.  The bent crescent knife was then used to make a tunnel up into clear cornea.  A Super-Sharp blade was then used to make a temporal stab incision.  Viscoat/viscoelastic solution was then placed in the anterior chamber.  A 2.8 mm keratome was then used to dimple into the anterior chamber.  A bent needle was then used to perform a capsulotomy.  The Utrata forceps was then used to perform a continuous curvilinear capsulorrhexis.  BSS or balanced salt solution on an irrigation cannula tip was then used to perform hydrodissection and hydrodelineation.  The nucleus was moved around to ensure that it was loose.  It was then phacoemulsified in a Greek cross or cruciate pattern. The CDE was 8.01.  Viscoat/viscoelastic was placed in the grooves of the cross and the nucleus was cracked into four quadrants.  Each quadrant was then phacoemulsified.  The remaining cortical material was then irrigated and aspirated out of the capsular bag  using the irrigation/aspiration handpiece.  The capsular bag was cleaned posteriorly to ensure that it was crystal clear.  It was then deepened with Provisc/viscoelastic solution.  At this point, an Eric  intraocular lens was implanted into the capsular bag using the injection system.  The haptics were inserted into the capsular bag.  The power was 13.0 diopters, and the serial number was [id number].  The irrigation/aspiration handpiece was then placed in the anterior chamber and was used to remove the remaining viscoelastic from the anterior chamber and the capsular bag.  Miostat solution was then placed in the anterior chamber and the pupil miosed appropriately.    The integrity of the wound was checked with Weck-terry and found to be appropriate.  At this point, the conjunctiva was closed with forceps cautery.  A TobraDex soaked 12-hour collagen corneal shield was placed on the patient's  operative eye.  Monitored anesthesia care was then discontinued.  The lid speculum was removed from the patient's operative eye and was double tight patched and shielded.  The patient returned to the recovery room in stable condition.  The patient was stable on discharge.  The patient was to follow up in my office on postoperative day #1.  There was no blood loss.

## 2024-01-25 NOTE — ANESTHESIA PREPROCEDURE EVALUATION
Patient: Nereida Conway    Procedure Information       Date/Time: 01/25/24 0945    Procedure: Phacoemulsification Cataract with Insertion Intraocular Lens (Right)    Location: [facility] / [facility]    Surgeons: Angelito Ashby MD            Relevant Problems   Anesthesia (within normal limits)      Cardiovascular   (+) HTN (hypertension)   (+) Hyperlipidemia   (+) PAD (peripheral artery disease) (CMS/HCC)      Endocrine   (+) Diabetic peripheral neuropathy (CMS/HCC)      GI   (+) GERD (gastroesophageal reflux disease)   (+) Irritable bowel syndrome with diarrhea      Neuro/Psych   (+) Anxiety   (+) Depression   (+) Diabetic peripheral neuropathy (CMS/HCC)   (+) Idiopathic peripheral neuropathy   (+) Lumbar radiculitis   (+) Peripheral neuropathy   (+) Right sided sciatica      Musculoskeletal   (+) Chronic right-sided low back pain with right-sided sciatica   (+) DDD (degenerative disc disease), lumbar   (+) Degenerative joint disease (DJD) of hip   (+) Osteoarthritis, hip, bilateral      Infectious Disease   (+) Genital herpes   (+) Onychomycosis of toenail      Other   (+) Sacroiliitis (CMS/HCC)       Clinical information reviewed:    Allergies                NPO Detail:  No data recorded     Physical Exam    Airway  Mallampati: II  TM distance: >3 FB  Neck ROM: full     Cardiovascular    Dental    Pulmonary    Abdominal            Anesthesia Plan    History of general anesthesia?: yes  History of complications of general anesthesia?: no    ASA 2     MAC     The patient is not a current smoker.  Patient was not previously instructed to abstain from smoking on day of procedure.  Patient did not smoke on day of procedure.  Education provided regarding risk of obstructive sleep apnea.  intravenous induction   Postoperative administration of opioids is intended.  Anesthetic plan and risks discussed with patient.  Use of blood products discussed with patient who consented to blood products.    Plan discussed with  CRNA and CAA.

## 2024-01-26 ENCOUNTER — OFFICE VISIT (OUTPATIENT)
Dept: OPHTHALMOLOGY | Facility: CLINIC | Age: 59
End: 2024-01-26
Payer: COMMERCIAL

## 2024-01-26 DIAGNOSIS — Z96.1 PSEUDOPHAKIA, RIGHT EYE: ICD-10-CM

## 2024-01-26 DIAGNOSIS — Z98.49 POSTOPERATIVE CARE FOR CATARACT: Primary | ICD-10-CM

## 2024-01-26 DIAGNOSIS — H18.231 SECONDARY CORNEAL EDEMA OF RIGHT EYE: ICD-10-CM

## 2024-01-26 DIAGNOSIS — Z48.810 POSTOPERATIVE CARE FOR CATARACT: Primary | ICD-10-CM

## 2024-01-26 PROBLEM — H18.239 CORNEAL EDEMA, SECONDARY: Status: ACTIVE | Noted: 2024-01-26

## 2024-01-26 PROBLEM — E11.42 DIABETIC PERIPHERAL NEUROPATHY (MULTI): Status: RESOLVED | Noted: 2023-09-21 | Resolved: 2024-01-26

## 2024-01-26 PROBLEM — H25.811 COMBINED FORMS OF AGE-RELATED CATARACT OF RIGHT EYE: Status: RESOLVED | Noted: 2023-11-29 | Resolved: 2024-01-26

## 2024-01-26 PROCEDURE — 99024 POSTOP FOLLOW-UP VISIT: CPT | Performed by: OPHTHALMOLOGY

## 2024-01-26 ASSESSMENT — PATIENT HEALTH QUESTIONNAIRE - PHQ9
SUM OF ALL RESPONSES TO PHQ9 QUESTIONS 1 AND 2: 0
1. LITTLE INTEREST OR PLEASURE IN DOING THINGS: NOT AT ALL
2. FEELING DOWN, DEPRESSED OR HOPELESS: NOT AT ALL

## 2024-01-26 ASSESSMENT — REFRACTION_WEARINGRX
OD_AXIS: 11
SPECS_TYPE: PROGRESSIVE
OD_ADD: 2.50
OD_CYLINDER: -0.50
OS_SPHERE: -3.25
OD_SPHERE: -5.75
OS_ADD: 2.50

## 2024-01-26 ASSESSMENT — VISUAL ACUITY
OS_CC: 20/25
OD_SC: 20/60
OD_SC+: +2
OS_CC+: +2
METHOD: SNELLEN - LINEAR

## 2024-01-26 ASSESSMENT — ENCOUNTER SYMPTOMS
NEUROLOGICAL NEGATIVE: 0
EYES NEGATIVE: 0
LOSS OF SENSATION IN FEET: 0
CARDIOVASCULAR NEGATIVE: 0
ALLERGIC/IMMUNOLOGIC NEGATIVE: 0
RESPIRATORY NEGATIVE: 0
MUSCULOSKELETAL NEGATIVE: 0
GASTROINTESTINAL NEGATIVE: 0
PSYCHIATRIC NEGATIVE: 0
HEMATOLOGIC/LYMPHATIC NEGATIVE: 0
ENDOCRINE NEGATIVE: 0
CONSTITUTIONAL NEGATIVE: 0
OCCASIONAL FEELINGS OF UNSTEADINESS: 0
DEPRESSION: 0

## 2024-01-26 ASSESSMENT — EXTERNAL EXAM - LEFT EYE: OS_EXAM: NORMAL

## 2024-01-26 ASSESSMENT — TONOMETRY
OS_IOP_MMHG: 18
IOP_METHOD: GOLDMANN APPLANATION
OD_IOP_MMHG: 16

## 2024-01-26 ASSESSMENT — PACHYMETRY
OS_CT(UM): 531
OD_CT(UM): 550

## 2024-01-26 ASSESSMENT — PAIN SCALES - GENERAL: PAINLEVEL: 0-NO PAIN

## 2024-01-26 ASSESSMENT — EXTERNAL EXAM - RIGHT EYE: OD_EXAM: NORMAL

## 2024-01-26 NOTE — ASSESSMENT & PLAN NOTE
Use drops (gtts) as directed.  Can use pf 6x/day through weekend and then qid starting Monday.  Cipro qid.  F/u one week pov.

## 2024-01-26 NOTE — PROGRESS NOTES
Subjective   Patient ID: Nereida Conway is a 58 y.o. female.    Chief Complaint    Post-op Follow-up       HPI       Post-op Follow-up    In right eye.             Comments    Headache, took Tylenol still not helping, foreign body sensation OD, No nausea    POV OD one day.  No new changes in health history or meds.  Mostly comfortable evening.              Last edited by Angelito Ashby MD on 1/26/2024 10:51 AM.        Current Outpatient Medications (Ophthalmology pharm classes)   Medication Sig Dispense Refill    ciprofloxacin (Ciloxan) 0.3 % ophthalmic solution Administer 1 drop into the right eye 4 times a day. STARTING THE DAY BEFORE SURGERY Do not start before January 24, 2024. 5 mL 1    prednisoLONE acetate (Pred-Forte) 1 % ophthalmic suspension Administer 1 drop into the right eye 4 times a day. STARTING THE DAY AFTER SURGERY Do not start before January 26, 2024. 5 mL 1     Current Outpatient Medications (Other)   Medication Sig Dispense Refill    acetaminophen (Tylenol Extra Strength) 500 mg tablet Take 2 tablets (1,000 mg) by mouth every 6 hours if needed for moderate pain (4 - 6).      amitriptyline (Elavil) 50 mg tablet Take 1 tablet (50 mg) by mouth once daily at bedtime. 90 tablet 1    atorvastatin (Lipitor) 80 mg tablet TAKE ONE TABLET BY MOUTH EVERY DAY 90 tablet 0    cholecalciferol (Vitamin D-3) 50 mcg (2,000 unit) capsule take one capsule by mouth once a day (Patient taking differently: Take 1 capsule (50 mcg) by mouth early in the morning..) 90 capsule 3    citalopram (CeleXA) 40 mg tablet TAKE ONE TABLET BY MOUTH EVERY DAY 30 tablet 0    lisinopriL-hydrochlorothiazide 20-12.5 mg tablet Take 1 tablet by mouth once daily. 90 tablet 3    propranolol LA (Inderal LA) 120 mg 24 hr capsule Take 1 capsule (120 mg) by mouth once daily. (Patient taking differently: Take 1 capsule (120 mg) by mouth once daily. HEADACHES) 90 capsule 3    rifAXIMin (Xifaxan) 550 mg tablet Take 1 tablet (550 mg) by mouth 3  times a day. (Patient taking differently: Take 1 tablet (550 mg) by mouth 3 times a day as needed (GI UPSET, IBS).) 42 tablet 3    rizatriptan (Maxalt) 10 mg tablet TAKE 1 TABLET BY MOUTH AT ONSET OF HEADACHE. MAY REPEAT EVERY 2 HOURS AS NEEDED. MAXIMUM OF 3 TABLETS IN 24 HOURS (Patient taking differently: Take 1 tablet (10 mg) by mouth 1 time if needed for migraine.) 30 tablet 0    topiramate (Topamax) 25 mg tablet Take 1 tablet (25 mg) by mouth 2 times a day. 1 TABLET EVERY MORNING AND 2 TABLETS AT BEDTIME      traMADol (Ultram) 50 mg tablet Take 1 tablet (50 mg) by mouth 3 times a day for 100 doses. 50 tablet 2    valACYclovir (Valtrex) 500 mg tablet TAKE ONE TABLET BY MOUTH EVERY DAY (Patient taking differently: Take 1 tablet (500 mg) by mouth once daily.) 30 tablet 11       Objective   Base Eye Exam       Visual Acuity (Snellen - Linear)         Right Left    Dist sc 20/60 +2     Dist cc  20/25 +2    Dist ph sc NI               Tonometry (Goldmann Applanation, 10:58 AM)         Right Left    Pressure 16 18              Pupils         Dark Shape React APD    Right 1 Miotic Minimal None    Left 3 Round 1 None              Extraocular Movement         Right Left     Full Full                  Slit Lamp and Fundus Exam       External Exam         Right Left    External Normal Normal              Slit Lamp Exam         Right Left    Lids/Lashes 1+ Blepharitis, 1+ Dermatochalasis - upper lid 1+ Blepharitis, 1+ Dermatochalasis - upper lid    Conjunctiva/Sclera 1+ Injection normal bulbar and palepbral conjunctiva    Cornea Epithelial edema, 1+ Descemet's folds normal epi/stroma/endo and tear film    Anterior Chamber 2+ Cell, 2+ Flare ant. chamber deep and quiet    Iris iris normal iris normal    Lens Centered posterior chamber intraocular lens 1+ Nuclear sclerosis, 1+ Cortical cataract    Anterior Vitreous vitreous clear and normal vitreous clear and normal                  Refraction       Wearing Rx         Sphere  Cylinder Axis Add    Right -5.75 -0.50 11 2.50    Left -3.25   2.50      Type: progressive                    Assessment/Plan   Problem List Items Addressed This Visit          Eye/Vision problems    Pseudophakia, right eye    Corneal edema, secondary       Other    Postoperative care for cataract - Primary     Use drops (gtts) as directed.  Can use pf 6x/day through weekend and then qid starting Monday.  Cipro qid.  F/u one week pov.

## 2024-01-26 NOTE — PATIENT INSTRUCTIONS
POST OPERATIVE INSTRUCTIONS FOR CATARACT SURGERY      RESTRICTIONS: DO NOT RUB YOUR EYE. NO HEAVY LIFTING OR BENDING OVER FOR 1 WEEK AFTER SURGERY.      WEAR YOUR PLASTIC SHIELD WHILE SLEEPING ONLY, FOR 1 WEEK AFTER SURGERY.      CARE INSTRUCTIONS:  CLEAN EYE GENTLY WITH A WASHCLOTH MOISTENED WITH WARM WATER AFTER REMOVING PLASTIC   SHIELD.  AVOID PUTTING WATER DIRECTLY INTO YOUR EYE AND DRY FACE GENTLY.  AVOID PUTTING  PRESSURE ON YOUR EYE. YOU MAY SHOWER AND/OR BATHE AS NORMAL, AVOID GETTING WATER DIRECTLY INTO   OPERATIVE EYE BY KEEPING CLOSED TIGHT AND COVERING WITH A WASHCLOTH.    APPLYING PLASTIC SHIELD:  IN THE POST-OPERATIVE KIT YOU RECEIVED A ROLL OF TAPE, SUNGLASSES (IN CASE YOU ARE   SENSITIVE TO LIGHT) AND A PLASTIC SHIELD.  USE 2-3 PIECES OF TAPE APPLIED DIAGONALLY FROM  THE FOREHEAD ACROSS THE SHIELD TO YOUR CHEEK.  THIS IS ONLY NEEDED WHILE SLEEPING.      DO NOT PUT ANYTHING UNDER THE SHIELD       PLEASE USE THE FOLLOWING MEDICATIONS:    CILOXAN (CIPROFLOXACIN)-RESTART THIS EYE DROP-: 1 DROP 4 TIMES PER DAY IN THE OPERATIVE EYE.  USE THE DROPS UNTIL THEY ARE GONE UNLESS OTHERWISE INSTRUCTED. DO NOT DISCARD UNTIL EMPTY.        WAIT 5 MINUTES IN BETWEEN DROPS     PRED FORTE (PREDNISOLONE)-SHAKE THIS BOTTLE-: 1 DROP 4 TIMES PER DAY IN THE OPERATIVE EYE.   USE THE DROPS UNTIL THEY ARE GONE UNLESS OTHERWISE INSTRUCTED. DO NOT DISCARD UNTIL EMPTY.      YOU MAY RESUME ANY MEDICATIONS YOU MAY HAVE STOPPED PRIOR TO SURGERY.  IF YOU HAVE ANY QUESTIONS PLEASE CALL OUR OFFICE @ [phone]

## 2024-01-27 DIAGNOSIS — F32.5 MAJOR DEPRESSIVE DISORDER WITH SINGLE EPISODE, IN FULL REMISSION (CMS-HCC): ICD-10-CM

## 2024-01-29 ENCOUNTER — TELEPHONE (OUTPATIENT)
Dept: OPHTHALMOLOGY | Facility: CLINIC | Age: 59
End: 2024-01-29
Payer: COMMERCIAL

## 2024-01-29 RX ORDER — CITALOPRAM 40 MG/1
40 TABLET, FILM COATED ORAL DAILY
Qty: 30 TABLET | Refills: 0 | Status: SHIPPED | OUTPATIENT
Start: 2024-01-29 | End: 2024-03-05

## 2024-01-29 NOTE — TELEPHONE ENCOUNTER
Spoke with pt and returned call.  Slowly improving but concerned about vision.  Told to continue drops (gtts) as directed and f/u on Wednesday as scheduled.  She agreed and thanked me.

## 2024-01-31 ENCOUNTER — OFFICE VISIT (OUTPATIENT)
Dept: OPHTHALMOLOGY | Facility: CLINIC | Age: 59
End: 2024-01-31
Payer: COMMERCIAL

## 2024-01-31 DIAGNOSIS — Z98.49 POSTOPERATIVE CARE FOR CATARACT: Primary | ICD-10-CM

## 2024-01-31 DIAGNOSIS — Z96.1 PSEUDOPHAKIA, RIGHT EYE: ICD-10-CM

## 2024-01-31 DIAGNOSIS — Z48.810 POSTOPERATIVE CARE FOR CATARACT: Primary | ICD-10-CM

## 2024-01-31 DIAGNOSIS — H18.231 SECONDARY CORNEAL EDEMA OF RIGHT EYE: ICD-10-CM

## 2024-01-31 PROCEDURE — 99024 POSTOP FOLLOW-UP VISIT: CPT | Performed by: OPHTHALMOLOGY

## 2024-01-31 ASSESSMENT — REFRACTION_WEARINGRX
OD_AXIS: 11
OD_ADD: 2.50
SPECS_TYPE: PROGRESSIVE
OD_SPHERE: -5.75
OS_ADD: 2.50
OD_CYLINDER: -0.50
OS_SPHERE: -3.25

## 2024-01-31 ASSESSMENT — PATIENT HEALTH QUESTIONNAIRE - PHQ9
1. LITTLE INTEREST OR PLEASURE IN DOING THINGS: NOT AT ALL
SUM OF ALL RESPONSES TO PHQ9 QUESTIONS 1 AND 2: 0
2. FEELING DOWN, DEPRESSED OR HOPELESS: NOT AT ALL

## 2024-01-31 ASSESSMENT — PACHYMETRY
OS_CT(UM): 531
OD_CT(UM): 550

## 2024-01-31 ASSESSMENT — VISUAL ACUITY
OS_CC+: -2
OD_SC: 20/40
METHOD: SNELLEN - SINGLE
OS_CC: 20/20

## 2024-01-31 ASSESSMENT — ENCOUNTER SYMPTOMS
RESPIRATORY NEGATIVE: 0
CARDIOVASCULAR NEGATIVE: 0
ENDOCRINE NEGATIVE: 0
OCCASIONAL FEELINGS OF UNSTEADINESS: 0
LOSS OF SENSATION IN FEET: 0
MUSCULOSKELETAL NEGATIVE: 0
HEMATOLOGIC/LYMPHATIC NEGATIVE: 0
EYES NEGATIVE: 0
PSYCHIATRIC NEGATIVE: 0
GASTROINTESTINAL NEGATIVE: 0
NEUROLOGICAL NEGATIVE: 0
ALLERGIC/IMMUNOLOGIC NEGATIVE: 0
CONSTITUTIONAL NEGATIVE: 0
DEPRESSION: 0

## 2024-01-31 ASSESSMENT — EXTERNAL EXAM - RIGHT EYE: OD_EXAM: NORMAL

## 2024-01-31 ASSESSMENT — TONOMETRY
IOP_METHOD: GOLDMANN APPLANATION
OD_IOP_MMHG: 13
OS_IOP_MMHG: 16

## 2024-01-31 ASSESSMENT — EXTERNAL EXAM - LEFT EYE: OS_EXAM: NORMAL

## 2024-01-31 ASSESSMENT — PAIN SCALES - GENERAL: PAINLEVEL: 0-NO PAIN

## 2024-01-31 NOTE — PATIENT INSTRUCTIONS
Use both pink and tan top drops four times a day until empty.  Can use artificial tears for lubrication.  Follow up in about one month for final visit for cataract surgery.

## 2024-01-31 NOTE — PROGRESS NOTES
Subjective   Patient ID: Nereida Conway is a 58 y.o. female.    Chief Complaint    Post-op Follow-up       HPI       Post-op Follow-up    In right eye.  Vision is blurred at distance and is blurred at near.             Comments    POV OD 6 days.  No new changes in health history or meds.  Vision is improving.  Using drops (gtts) as directed.            Last edited by Angelito Ashby MD on 1/31/2024  2:12 PM.        Current Outpatient Medications (Ophthalmology pharm classes)   Medication Sig Dispense Refill    ciprofloxacin (Ciloxan) 0.3 % ophthalmic solution Administer 1 drop into the right eye 4 times a day. STARTING THE DAY BEFORE SURGERY Do not start before January 24, 2024. 5 mL 1    prednisoLONE acetate (Pred-Forte) 1 % ophthalmic suspension Administer 1 drop into the right eye 4 times a day. STARTING THE DAY AFTER SURGERY Do not start before January 26, 2024. 5 mL 1     Current Outpatient Medications (Other)   Medication Sig Dispense Refill    acetaminophen (Tylenol Extra Strength) 500 mg tablet Take 2 tablets (1,000 mg) by mouth every 6 hours if needed for moderate pain (4 - 6).      amitriptyline (Elavil) 50 mg tablet Take 1 tablet (50 mg) by mouth once daily at bedtime. 90 tablet 1    atorvastatin (Lipitor) 80 mg tablet TAKE ONE TABLET BY MOUTH EVERY DAY 90 tablet 0    cholecalciferol (Vitamin D-3) 50 mcg (2,000 unit) capsule take one capsule by mouth once a day (Patient taking differently: Take 1 capsule (50 mcg) by mouth early in the morning..) 90 capsule 3    citalopram (CeleXA) 40 mg tablet TAKE ONE TABLET BY MOUTH EVERY DAY 30 tablet 0    lisinopriL-hydrochlorothiazide 20-12.5 mg tablet Take 1 tablet by mouth once daily. 90 tablet 3    propranolol LA (Inderal LA) 120 mg 24 hr capsule Take 1 capsule (120 mg) by mouth once daily. (Patient taking differently: Take 1 capsule (120 mg) by mouth once daily. HEADACHES) 90 capsule 3    rifAXIMin (Xifaxan) 550 mg tablet Take 1 tablet (550 mg) by mouth 3 times a  day. (Patient taking differently: Take 1 tablet (550 mg) by mouth 3 times a day as needed (GI UPSET, IBS).) 42 tablet 3    rizatriptan (Maxalt) 10 mg tablet TAKE 1 TABLET BY MOUTH AT ONSET OF HEADACHE. MAY REPEAT EVERY 2 HOURS AS NEEDED. MAXIMUM OF 3 TABLETS IN 24 HOURS (Patient taking differently: Take 1 tablet (10 mg) by mouth 1 time if needed for migraine.) 30 tablet 0    topiramate (Topamax) 25 mg tablet Take 1 tablet (25 mg) by mouth 2 times a day. 1 TABLET EVERY MORNING AND 2 TABLETS AT BEDTIME      traMADol (Ultram) 50 mg tablet Take 1 tablet (50 mg) by mouth 3 times a day for 100 doses. 50 tablet 2    valACYclovir (Valtrex) 500 mg tablet TAKE ONE TABLET BY MOUTH EVERY DAY (Patient taking differently: Take 1 tablet (500 mg) by mouth once daily.) 30 tablet 11       Objective   Base Eye Exam       Visual Acuity (Snellen - Single)         Right Left    Dist sc 20/40     Dist cc  20/20 -2              Tonometry (Goldmann Applanation, 2:17 PM)         Right Left    Pressure 13 16   Holding lids.              Pupils         Dark Shape React APD    Right 3 Round 1 None    Left 4 Round 1 None              Extraocular Movement         Right Left     Full Full                  Slit Lamp and Fundus Exam       External Exam         Right Left    External Normal Normal              Slit Lamp Exam         Right Left    Lids/Lashes 1+ Blepharitis, 1+ Dermatochalasis - upper lid 1+ Blepharitis, 1+ Dermatochalasis - upper lid    Conjunctiva/Sclera White and quiet normal bulbar and palepbral conjunctiva    Cornea Superior 1+ Edema, Superior 1+ Descemet's folds normal epi/stroma/endo and tear film    Anterior Chamber 1+ Cell, Trace Flare ant. chamber deep and quiet    Iris iris normal iris normal    Lens Centered posterior chamber intraocular lens 1+ Nuclear sclerosis, 1+ Cortical cataract    Anterior Vitreous vitreous clear and normal vitreous clear and normal                  Refraction       Wearing Rx         Sphere  Cylinder Axis Add    Right -5.75 -0.50 11 2.50    Left -3.25   2.50      Type: progressive                    Assessment/Plan   Problem List Items Addressed This Visit          Eye/Vision problems    Pseudophakia, right eye    Corneal edema, secondary       Other    Postoperative care for cataract - Primary     Use pf and cipro qid until finished.  F/u 3-4 wks pov final rx.  Reassure.

## 2024-02-02 ENCOUNTER — APPOINTMENT (OUTPATIENT)
Dept: OCCUPATIONAL THERAPY | Facility: CLINIC | Age: 59
End: 2024-02-02
Payer: COMMERCIAL

## 2024-02-06 ENCOUNTER — OFFICE VISIT (OUTPATIENT)
Dept: GASTROENTEROLOGY | Facility: CLINIC | Age: 59
End: 2024-02-06
Payer: COMMERCIAL

## 2024-02-06 VITALS — WEIGHT: 135 LBS | BODY MASS INDEX: 24.84 KG/M2 | HEART RATE: 100 BPM | OXYGEN SATURATION: 98 % | HEIGHT: 62 IN

## 2024-02-06 DIAGNOSIS — R63.4 WEIGHT LOSS: ICD-10-CM

## 2024-02-06 DIAGNOSIS — K58.0 IRRITABLE BOWEL SYNDROME WITH DIARRHEA: ICD-10-CM

## 2024-02-06 DIAGNOSIS — R63.4 LOSS OF WEIGHT: ICD-10-CM

## 2024-02-06 DIAGNOSIS — K52.9 CHRONIC DIARRHEA: Primary | ICD-10-CM

## 2024-02-06 PROCEDURE — 1036F TOBACCO NON-USER: CPT | Performed by: INTERNAL MEDICINE

## 2024-02-06 PROCEDURE — 99215 OFFICE O/P EST HI 40 MIN: CPT | Performed by: INTERNAL MEDICINE

## 2024-02-06 RX ORDER — POLYETHYLENE GLYCOL 3350, SODIUM SULFATE ANHYDROUS, SODIUM BICARBONATE, SODIUM CHLORIDE, POTASSIUM CHLORIDE 236; 22.74; 6.74; 5.86; 2.97 G/4L; G/4L; G/4L; G/4L; G/4L
4000 POWDER, FOR SOLUTION ORAL ONCE
Qty: 4000 ML | Refills: 0 | Status: SHIPPED | OUTPATIENT
Start: 2024-02-06 | End: 2024-02-06

## 2024-02-06 ASSESSMENT — ENCOUNTER SYMPTOMS
NERVOUS/ANXIOUS: 1
HEADACHES: 0
FEVER: 0
FATIGUE: 0
CHILLS: 0
SHORTNESS OF BREATH: 0
ARTHRALGIAS: 0
MYALGIAS: 0
UNEXPECTED WEIGHT CHANGE: 1
DIFFICULTY URINATING: 0

## 2024-02-06 NOTE — PROGRESS NOTES
Assessment/Plan    Nereida Conway is a 58 y.o. female anxiety, depression, HTN, HLD, migraines, PAD, peripheral neuropathy who presents to GI clinic for follow up of chronic diarrhea and weight loss.    Chronic diarrhea  She has had chronic diarrhea for quite a while now, and also continues to lose weight.  She has poor appetite, early satiety, and significant anxiety which are all leading to her weight loss.  She has been taking Xifaxan regularly and will notice that food goes right through her if she doesn't take it.  She is also taking Imodium, IBgard, and a strong probiotic.  She is quite concerned that there is something wrong, especially with the ongoing weight loss.  We did stool testing last year that showed mildly abnormal calprotectin, which can sometimes be seen in the setting of microscopic colitis (other stool tests like elastase and infectious studies were normal).  She had unremarkable colonoscopy in 2020, however no biopsies were done at that time.  Would want to evaluate for microscopic colitis, IBD, given weight loss other possibilities include celiac disease, eosinophilic gastroenteritis, hormone-secreting tumors.  - schedule for EGD/colonoscopy, will add on urgently next week  - ordered CT to evaluate for other occult causes of weight loss  - ordered blood work including BMP, CBC, TSH  - OK to continue Xifaxan for now  - if above testing is not helpful, would also consider workup for hormone-secreting tumors such as gastrinoma, VIPoma, carcinoid, etc.    Weight loss  See A/P for chronic diarrhea.         Subjective     History of Present Illness   Nereida Conway is a 58 y.o. female with PMHx of anxiety, depression, HTN, HLD, migraines, PAD, peripheral neuropathy who presents to GI clinic for follow up.  Last seen around 3 months ago for follow up of ongoing diarrhea and weight loss.  Had tried Imodium, Pepto Bismol which only partially helped, did notice Xifaxan helped with symptoms so we tried  "to get her Xifaxan.  We also discussed repeat colonoscopy to look for microscopic colitis if Xifaxan didn't help.  Of note, her symptoms did resolve when she went on vacation, suggesting an element of IBS-D.  Still taking Xifaxan - if misses it everything goes through her  While on Xifaxan she does OK most of the time but can still have diarrhea  She is not eating much, both because she is afraid to have diarrhea but also because she has a poor appetite and can get full quickly  Weight started at 165, continues to lose weight, now 135  Family is getting concerned  She is becoming very anxious about the situation and strongly feels there is something wrong, is tearful during times of the interview    Past history:  \"I have been really sick\"  Losing weight - has lost around 35 pounds  Has had at least 10 episodes of sick stomach and diarrhea  Last episode lasted 7 days  Doesn't matter what she's eating, will have constant diarrhea  PCP gave her samples of Xifaxan which does help, was only given 3 days' worth  Gets lasting results from Xifaxan  Has tried Imodium which only helps some times, has also tried Pepto Bismol  Went on vacation and had no problems  Had been taking Imodium but didn't want to take it every day  Starting to really wear her down, getting concerned  She does use IBgard or drinks peppermint tea which can help to settle her stomach  Has always had stomach problems her whole life  Was on vacation around 3 months ago and stomach got much worse  Was keeping her home - cramping, diarrhea, churning sounds  Was losing weight as well, wasn't eating well, felt very lethargic  Got occasional black tarry stools but would also be normal occasionally  PCP put her on Flagyl, then Xifaxan which seemed to help somewhat  Last week had steak, woke up with severe cramping and massive diarrhea  Xifaxan was restarted, currently still on this, bowels more normal now  She takes Pepto Bismol occasionally but not every time " she saw the black stools  Did notice significant improvement with IBgard but was told to stop taking it for some reason  Weight has stabilized since the initial weight loss  Her boyfriend is present during the visit and states that she tends to worry a lot about everything  Had negative celiac panel, negative pathogen PCR and O+P (C. diff test was canceled)  Colonoscopy 12/2020 ([facility]) with only hemorrhoids and diverticulosis, 10-year f/u recommended    Review of Systems  Review of Systems   Constitutional:  Positive for unexpected weight change. Negative for chills, fatigue and fever.   Respiratory:  Negative for shortness of breath.    Cardiovascular:  Negative for chest pain.   Genitourinary:  Negative for difficulty urinating.   Musculoskeletal:  Negative for arthralgias and myalgias.   Neurological:  Negative for headaches.   Psychiatric/Behavioral:  The patient is nervous/anxious.    All other systems reviewed and are negative.      Allergies  Allergies   Allergen Reactions    Duloxetine Other and Hallucinations     severe diaphoresis    Gabapentin Hallucinations    Indomethacin GI Upset     severe gi upset    Prednisone Swelling    Sulfamethoxazole Rash       Medications  Current Outpatient Medications   Medication Instructions    acetaminophen (TYLENOL EXTRA STRENGTH) 1,000 mg, oral, Every 6 hours PRN    amitriptyline (ELAVIL) 50 mg, oral, Nightly    atorvastatin (LIPITOR) 80 mg, oral, Daily    cholecalciferol (Vitamin D-3) 50 mcg (2,000 unit) capsule take one capsule by mouth once a day    ciprofloxacin (Ciloxan) 0.3 % ophthalmic solution 1 drop, Right Eye, 4 times daily, STARTING THE DAY BEFORE SURGERY    citalopram (CELEXA) 40 mg, oral, Daily    lisinopriL-hydrochlorothiazide 20-12.5 mg tablet 1 tablet, oral, Daily    prednisoLONE acetate (Pred-Forte) 1 % ophthalmic suspension 1 drop, Right Eye, 4 times daily, STARTING THE DAY AFTER SURGERY    propranolol LA (INDERAL LA) 120 mg, oral, Daily    rifAXIMin  "(XIFAXAN) 550 mg, oral, 3 times daily    rizatriptan (Maxalt) 10 mg tablet TAKE 1 TABLET BY MOUTH AT ONSET OF HEADACHE. MAY REPEAT EVERY 2 HOURS AS NEEDED. MAXIMUM OF 3 TABLETS IN 24 HOURS    topiramate (TOPAMAX) 25 mg, oral, 2 times daily, 1 TABLET EVERY MORNING AND 2 TABLETS AT BEDTIME    traMADol (ULTRAM) 50 mg, oral, 3 times daily    valACYclovir (Valtrex) 500 mg tablet TAKE ONE TABLET BY MOUTH EVERY DAY        Objective     Visit Vitals  Pulse 100   Ht 1.575 m (5' 2\")   Wt 61.2 kg (135 lb)   SpO2 98%   BMI 24.69 kg/m²   OB Status Hysterectomy   Smoking Status Former   BSA 1.64 m²       Physical Exam  Constitutional:       General: She is not in acute distress.  Eyes:      Extraocular Movements: Extraocular movements intact.   Abdominal:      General: Bowel sounds are normal. There is no distension.      Palpations: Abdomen is soft.      Tenderness: There is no abdominal tenderness. There is no guarding or rebound.   Skin:     General: Skin is warm and dry.   Neurological:      General: No focal deficit present.      Mental Status: She is alert.   Psychiatric:         Behavior: Behavior normal.      Comments: Tearful at times           Lab Results   Component Value Date    WBC 8.8 07/16/2019    WBC 5.6 07/01/2019    HGB 11.0 (L) 07/16/2019    HGB 12.3 07/01/2019    HCT 33.6 (L) 07/16/2019    HCT 36.4 07/01/2019    MCV 99 07/16/2019    MCV 97 07/01/2019     (L) 07/16/2019     07/01/2019     Lab Results   Component Value Date     07/16/2019     07/01/2019    K 4.3 07/16/2019    K 4.3 07/01/2019     07/16/2019     07/01/2019    CO2 28 07/16/2019    CO2 31 07/01/2019    BUN 11 07/16/2019    BUN 10 07/01/2019    CREATININE 0.79 07/16/2019    CREATININE 0.74 07/01/2019    CALCIUM 8.8 07/16/2019    CALCIUM 9.6 07/01/2019    PROT 6.9 01/31/2022    PROT 7.0 09/10/2018    BILITOT 0.4 09/10/2018    BILITOT 0.3 06/13/2018    ALKPHOS 82 09/10/2018    ALKPHOS 81 06/13/2018    ALT 31 " 09/10/2018    ALT 37 06/13/2018    AST 16 09/10/2018    AST 20 06/13/2018    GLUCOSE 126 (H) 07/16/2019    GLUCOSE 118 (H) 07/01/2019       Recent Imaging  No results found.      Pedro Durant MD      Time Spent  Prep time on day of patient encounter: 5 minutes  Time spent directly with patient, family or caregiver: 27 minutes  Additional Time Spent on Patient Care Activities: 0 minutes  Documentation Time: 10 minutes  Other Time Spent: 0 minutes  Total: 42 minutes

## 2024-02-06 NOTE — ASSESSMENT & PLAN NOTE
She has had chronic diarrhea for quite a while now, and also continues to lose weight.  She has poor appetite, early satiety, and significant anxiety which are all leading to her weight loss.  She has been taking Xifaxan regularly and will notice that food goes right through her if she doesn't take it.  She is also taking Imodium, IBgard, and a strong probiotic.  She is quite concerned that there is something wrong, especially with the ongoing weight loss.  We did stool testing last year that showed mildly abnormal calprotectin, which can sometimes be seen in the setting of microscopic colitis (other stool tests like elastase and infectious studies were normal).  She had unremarkable colonoscopy in 2020, however no biopsies were done at that time.  Would want to evaluate for microscopic colitis, IBD, given weight loss other possibilities include celiac disease, eosinophilic gastroenteritis, hormone-secreting tumors.  - schedule for EGD/colonoscopy, will add on urgently next week  - ordered CT to evaluate for other occult causes of weight loss  - ordered blood work including BMP, CBC, TSH  - OK to continue Xifaxan for now  - if above testing is not helpful, would also consider workup for hormone-secreting tumors such as gastrinoma, VIPoma, carcinoid, etc.

## 2024-02-06 NOTE — LETTER
February 6, 2024     Lise Jeff PA-C  [address] [facility] [address]    Patient: Nereida Conway   YOB: 1965   Date of Visit: 2/6/2024       Dear Dr. Lise Jeff PA-C:    I saw Nereida Conway in follow up today. Below are the relevant portions of my assessment and plan of care.    Assessment / Plan:    Chronic diarrhea  She has had chronic diarrhea for quite a while now, and also continues to lose weight.  She has poor appetite, early satiety, and significant anxiety which are all leading to her weight loss.  She has been taking Xifaxan regularly and will notice that food goes right through her if she doesn't take it.  She is also taking Imodium, IBgard, and a strong probiotic.  She is quite concerned that there is something wrong, especially with the ongoing weight loss.  We did stool testing last year that showed mildly abnormal calprotectin, which can sometimes be seen in the setting of microscopic colitis (other stool tests like elastase and infectious studies were normal).  She had unremarkable colonoscopy in 2020, however no biopsies were done at that time.  Would want to evaluate for microscopic colitis, IBD, given weight loss other possibilities include celiac disease, eosinophilic gastroenteritis, hormone-secreting tumors.  - schedule for EGD/colonoscopy, will add on urgently next week  - ordered CT to evaluate for other occult causes of weight loss  - ordered blood work including BMP, CBC, TSH  - OK to continue Xifaxan for now  - if above testing is not helpful, would also consider workup for hormone-secreting tumors such as gastrinoma, VIPoma, carcinoid, etc.    Weight loss  See A/P for chronic diarrhea.     If you have questions, please do not hesitate to reach out to me. I look forward to following Nereida along with you.         Sincerely,        Pedro Durant MD        CC: No Recipients

## 2024-02-11 ASSESSMENT — DERMATOLOGY QUALITY OF LIFE (QOL) ASSESSMENT
RATE HOW BOTHERED YOU ARE BY SYMPTOMS OF YOUR SKIN PROBLEM (EG, ITCHING, STINGING BURNING, HURTING OR SKIN IRRITATION): 0 - NEVER BOTHERED
RATE HOW EMOTIONALLY BOTHERED YOU ARE BY YOUR SKIN PROBLEM (FOR EXAMPLE, WORRY, EMBARRASSMENT, FRUSTRATION): 0 - NEVER BOTHERED
WHAT SINGLE SKIN CONDITION LISTED BELOW IS THE PATIENT ANSWERING THE QUALITY-OF-LIFE ASSESSMENT QUESTIONS ABOUT: NONE OF THE ABOVE
RATE HOW EMOTIONALLY BOTHERED YOU ARE BY YOUR SKIN PROBLEM (FOR EXAMPLE, WORRY, EMBARRASSMENT, FRUSTRATION): 0 - NEVER BOTHERED
RATE HOW BOTHERED YOU ARE BY SYMPTOMS OF YOUR SKIN PROBLEM (EG, ITCHING, STINGING BURNING, HURTING OR SKIN IRRITATION): 0 - NEVER BOTHERED
WHAT SINGLE SKIN CONDITION LISTED BELOW IS THE PATIENT ANSWERING THE QUALITY-OF-LIFE ASSESSMENT QUESTIONS ABOUT: NONE OF THE ABOVE

## 2024-02-12 ENCOUNTER — TELEPHONE (OUTPATIENT)
Dept: OPHTHALMOLOGY | Facility: CLINIC | Age: 59
End: 2024-02-12

## 2024-02-12 ENCOUNTER — OFFICE VISIT (OUTPATIENT)
Dept: DERMATOLOGY | Facility: CLINIC | Age: 59
End: 2024-02-12
Payer: COMMERCIAL

## 2024-02-12 ENCOUNTER — LAB (OUTPATIENT)
Dept: LAB | Facility: LAB | Age: 59
End: 2024-02-12
Payer: COMMERCIAL

## 2024-02-12 DIAGNOSIS — D22.5 MELANOCYTIC NEVUS OF TRUNK: ICD-10-CM

## 2024-02-12 DIAGNOSIS — K52.9 CHRONIC DIARRHEA: ICD-10-CM

## 2024-02-12 DIAGNOSIS — L81.1 MELASMA: ICD-10-CM

## 2024-02-12 DIAGNOSIS — D48.5 NEOPLASM OF UNCERTAIN BEHAVIOR OF SKIN: Primary | ICD-10-CM

## 2024-02-12 DIAGNOSIS — L65.9 HAIR LOSS: ICD-10-CM

## 2024-02-12 DIAGNOSIS — R63.4 WEIGHT LOSS: ICD-10-CM

## 2024-02-12 DIAGNOSIS — L82.1 SEBORRHEIC KERATOSIS: ICD-10-CM

## 2024-02-12 DIAGNOSIS — L21.9 SEBORRHEIC DERMATITIS: ICD-10-CM

## 2024-02-12 DIAGNOSIS — L57.8 DIFFUSE PHOTODAMAGE OF SKIN: ICD-10-CM

## 2024-02-12 LAB
ANION GAP SERPL CALC-SCNC: 12 MMOL/L (ref 10–20)
BUN SERPL-MCNC: 15 MG/DL (ref 6–23)
CALCIUM SERPL-MCNC: 11.2 MG/DL (ref 8.6–10.6)
CHLORIDE SERPL-SCNC: 109 MMOL/L (ref 98–107)
CO2 SERPL-SCNC: 29 MMOL/L (ref 21–32)
CREAT SERPL-MCNC: 1.1 MG/DL (ref 0.5–1.05)
EGFRCR SERPLBLD CKD-EPI 2021: 58 ML/MIN/1.73M*2
ERYTHROCYTE [DISTWIDTH] IN BLOOD BY AUTOMATED COUNT: 12.6 % (ref 11.5–14.5)
GLUCOSE SERPL-MCNC: 107 MG/DL (ref 74–99)
HCT VFR BLD AUTO: 42 % (ref 36–46)
HGB BLD-MCNC: 14 G/DL (ref 12–16)
MCH RBC QN AUTO: 33.3 PG (ref 26–34)
MCHC RBC AUTO-ENTMCNC: 33.3 G/DL (ref 32–36)
MCV RBC AUTO: 100 FL (ref 80–100)
NRBC BLD-RTO: 0 /100 WBCS (ref 0–0)
PLATELET # BLD AUTO: 184 X10*3/UL (ref 150–450)
POTASSIUM SERPL-SCNC: 5.1 MMOL/L (ref 3.5–5.3)
RBC # BLD AUTO: 4.2 X10*6/UL (ref 4–5.2)
SODIUM SERPL-SCNC: 145 MMOL/L (ref 136–145)
TSH SERPL-ACNC: 2.32 MIU/L (ref 0.44–3.98)
WBC # BLD AUTO: 6.2 X10*3/UL (ref 4.4–11.3)

## 2024-02-12 PROCEDURE — 85027 COMPLETE CBC AUTOMATED: CPT

## 2024-02-12 PROCEDURE — 80048 BASIC METABOLIC PNL TOTAL CA: CPT

## 2024-02-12 PROCEDURE — 36415 COLL VENOUS BLD VENIPUNCTURE: CPT

## 2024-02-12 PROCEDURE — 11301 SHAVE SKIN LESION 0.6-1.0 CM: CPT | Performed by: DERMATOLOGY

## 2024-02-12 PROCEDURE — 88342 IMHCHEM/IMCYTCHM 1ST ANTB: CPT | Performed by: DERMATOLOGY

## 2024-02-12 PROCEDURE — 99204 OFFICE O/P NEW MOD 45 MIN: CPT | Performed by: DERMATOLOGY

## 2024-02-12 PROCEDURE — 1036F TOBACCO NON-USER: CPT | Performed by: DERMATOLOGY

## 2024-02-12 PROCEDURE — 88305 TISSUE EXAM BY PATHOLOGIST: CPT | Performed by: DERMATOLOGY

## 2024-02-12 PROCEDURE — 84443 ASSAY THYROID STIM HORMONE: CPT

## 2024-02-12 RX ORDER — KETOCONAZOLE 20 MG/G
CREAM TOPICAL 2 TIMES DAILY
Qty: 60 G | Refills: 11 | Status: SHIPPED | OUTPATIENT
Start: 2024-02-12 | End: 2025-02-11

## 2024-02-12 RX ORDER — TRETINOIN 0.25 MG/G
1 CREAM TOPICAL NIGHTLY
Qty: 45 G | Refills: 11 | Status: SHIPPED | OUTPATIENT
Start: 2024-02-12

## 2024-02-12 NOTE — TELEPHONE ENCOUNTER
Returned call and reassured pt. About post-op course.  Told to finish drops (gtts) and use art tears often as needed.  F/u next post-op visit as scheduled.  She agreed and thanked me.

## 2024-02-12 NOTE — TELEPHONE ENCOUNTER
Patient called because of right eye pain and having a hard time seeing.  She is also out of her eye drops.  Last office visit: 01/31/24    Thank you,  Lacy

## 2024-02-12 NOTE — PROGRESS NOTES
Subjective     Nereida Conway is a 58 y.o. female who presents for the following: Skin Exam.  She states she has not noticed any changes in any of her brown spots recently, including in size, shape, or color, and they are all asymptomatic with no associated bleeding, itching, or burning.  She also notes brown patches on her face, especially her cheeks, for several years and dry, flaky skin around her nose.      Review of Systems:  No other skin or systemic complaints other than what is documented elsewhere in the note.    The following portions of the chart were reviewed this encounter and updated as appropriate:       Skin Cancer History  No skin cancer on file.    Specialty Problems          Dermatology Problems    Chloasma    Hemangioma of skin and subcutaneous tissue    Inflamed seborrheic keratosis    Melanocytic nevi of trunk    Melanocytic nevi of unspecified lower limb, including hip    Melanocytic nevi of unspecified upper limb, including shoulder    Other hypertrophic disorders of the skin    Other melanin hyperpigmentation    Cutaneous skin tags    Melasma    Onychomycosis of toenail    Seborrheic dermatitis       Past Dermatologic / Past Relevant Medical History:    No history of atypical nevi or skin cancer    Family History:    No family history of melanoma or skin cancer    Social History:    The patient states she works at a [employer] in [place]    Allergies:  Duloxetine, Gabapentin, Indomethacin, Prednisone, and Sulfamethoxazole    Current Medications / CAM's:    Current Outpatient Medications:     acetaminophen (Tylenol Extra Strength) 500 mg tablet, Take 2 tablets (1,000 mg) by mouth every 6 hours if needed for moderate pain (4 - 6)., Disp: , Rfl:     amitriptyline (Elavil) 50 mg tablet, Take 1 tablet (50 mg) by mouth once daily at bedtime., Disp: 90 tablet, Rfl: 1    atorvastatin (Lipitor) 80 mg tablet, TAKE ONE TABLET BY MOUTH EVERY DAY, Disp: 90 tablet, Rfl: 0     cholecalciferol (Vitamin D-3) 50 mcg (2,000 unit) capsule, take one capsule by mouth once a day (Patient taking differently: Take 1 capsule (50 mcg) by mouth early in the morning..), Disp: 90 capsule, Rfl: 3    ciprofloxacin (Ciloxan) 0.3 % ophthalmic solution, Administer 1 drop into the right eye 4 times a day. STARTING THE DAY BEFORE SURGERY Do not start before January 24, 2024., Disp: 5 mL, Rfl: 1    citalopram (CeleXA) 40 mg tablet, TAKE ONE TABLET BY MOUTH EVERY DAY, Disp: 30 tablet, Rfl: 0    ketoconazole (NIZOral) 2 % cream, Apply topically 2 times a day. To affected areas of face, Disp: 60 g, Rfl: 11    lisinopriL-hydrochlorothiazide 20-12.5 mg tablet, Take 1 tablet by mouth once daily., Disp: 90 tablet, Rfl: 3    prednisoLONE acetate (Pred-Forte) 1 % ophthalmic suspension, Administer 1 drop into the right eye 4 times a day. STARTING THE DAY AFTER SURGERY Do not start before January 26, 2024., Disp: 5 mL, Rfl: 1    propranolol LA (Inderal LA) 120 mg 24 hr capsule, Take 1 capsule (120 mg) by mouth once daily. (Patient taking differently: Take 1 capsule (120 mg) by mouth once daily. HEADACHES), Disp: 90 capsule, Rfl: 3    rifAXIMin (Xifaxan) 550 mg tablet, Take 1 tablet (550 mg) by mouth 3 times a day. (Patient taking differently: Take 1 tablet (550 mg) by mouth 3 times a day as needed (GI UPSET, IBS).), Disp: 42 tablet, Rfl: 3    rizatriptan (Maxalt) 10 mg tablet, TAKE 1 TABLET BY MOUTH AT ONSET OF HEADACHE. MAY REPEAT EVERY 2 HOURS AS NEEDED. MAXIMUM OF 3 TABLETS IN 24 HOURS (Patient taking differently: Take 1 tablet (10 mg) by mouth 1 time if needed for migraine.), Disp: 30 tablet, Rfl: 0    topiramate (Topamax) 25 mg tablet, Take 1 tablet (25 mg) by mouth 2 times a day. 1 TABLET EVERY MORNING AND 2 TABLETS AT BEDTIME, Disp: , Rfl:     traMADol (Ultram) 50 mg tablet, Take 1 tablet (50 mg) by mouth 3 times a day for 100 doses., Disp: 50 tablet, Rfl: 2    tretinoin (Retin-A) 0.025 % cream, Apply 1 Application  topically once daily at bedtime. Start 1-2 nights per week 1-2 weeks, inc to every other night, then every night as tolerated, Disp: 45 g, Rfl: 11    valACYclovir (Valtrex) 500 mg tablet, TAKE ONE TABLET BY MOUTH EVERY DAY (Patient taking differently: Take 1 tablet (500 mg) by mouth once daily.), Disp: 30 tablet, Rfl: 11     Objective   Well appearing patient in no apparent distress; mood and affect are within normal limits.    A full examination was performed including scalp, face, eyes, ears, nose, lips, neck, chest, axillae, abdomen, back, bilateral upper extremities, and bilateral lower extremities. All findings within normal limits unless otherwise noted below.    Assessment/Plan   1. Neoplasm of uncertain behavior of skin (3)  Left Medial Upper Back  6 mm dark brown pigmented, asymmetric macule with an asymmetric pigment network and irregular borders     Shave removal    Lesion length (cm):  0.6  Margin per side (cm):  0.2  Lesion diameter (cm):  1  Informed consent: discussed and consent obtained    Timeout: patient name, date of birth, surgical site, and procedure verified    Procedure prep:  Patient was prepped and draped  Anesthesia: the lesion was anesthetized in a standard fashion    Anesthetic:  1% lidocaine w/ epinephrine 1-100,000 local infiltration  Instrument used: flexible razor blade    Hemostasis achieved with: aluminum chloride    Outcome: patient tolerated procedure well    Post-procedure details: sterile dressing applied and wound care instructions given    Dressing type: bandage and petrolatum      Staff Communication: Dermatology Local Anesthesia: 1 % Lidocaine / Epinephrine - Amount:0.5ml    Specimen 1 - Dermatopathology- DERM LAB  Differential Diagnosis: DN  Check Margins Yes/No?:    Comments:    Dermpath Lab: Routine Histopathology (formalin-fixed tissue)    Right Upper Back  6 mm dark brown pigmented, asymmetric macule with an asymmetric pigment network and irregular borders     Shave  removal    Lesion length (cm):  0.6  Margin per side (cm):  0.2  Lesion diameter (cm):  1  Informed consent: discussed and consent obtained    Timeout: patient name, date of birth, surgical site, and procedure verified    Procedure prep:  Patient was prepped and draped  Anesthesia: the lesion was anesthetized in a standard fashion    Anesthetic:  1% lidocaine w/ epinephrine 1-100,000 local infiltration  Instrument used: flexible razor blade    Hemostasis achieved with: aluminum chloride    Outcome: patient tolerated procedure well    Post-procedure details: sterile dressing applied and wound care instructions given    Dressing type: bandage and petrolatum      Staff Communication: Dermatology Local Anesthesia: 1 % Lidocaine / Epinephrine - Amount:0.5ml    Specimen 2 - Dermatopathology- DERM LAB  Differential Diagnosis: DN  Check Margins Yes/No?:    Comments:    Dermpath Lab: Routine Histopathology (formalin-fixed tissue)    Right Lateral Upper Back  5 mm dark brown pigmented, asymmetric macule with an asymmetric pigment network and irregular borders     Shave removal    Lesion length (cm):  0.5  Margin per side (cm):  0.2  Lesion diameter (cm):  0.9  Informed consent: discussed and consent obtained    Timeout: patient name, date of birth, surgical site, and procedure verified    Procedure prep:  Patient was prepped and draped  Anesthesia: the lesion was anesthetized in a standard fashion    Anesthetic:  1% lidocaine w/ epinephrine 1-100,000 local infiltration  Instrument used: flexible razor blade    Hemostasis achieved with: aluminum chloride    Outcome: patient tolerated procedure well    Post-procedure details: sterile dressing applied and wound care instructions given    Dressing type: bandage and petrolatum      Staff Communication: Dermatology Local Anesthesia: 1 % Lidocaine / Epinephrine - Amount:0.5ml    Specimen 3 - Dermatopathology- DERM LAB  Differential Diagnosis: DN  Check Margins Yes/No?:    Comments:     Dermpath Lab: Routine Histopathology (formalin-fixed tissue)    2. Hair loss    Related Procedures  Referral to Dermatology    3. Melanocytic nevus of trunk  Scattered on the patient's face, neck, trunk, and extremities, there are multiple small, round- to oval-shaped, brown-pigmented and pink-colored, symmetric, uniform-appearing macules and dome-shaped papules    Clinically benign- to slightly atypical-appearing nevi - the clinically benign- to slightly atypical-appearing nature of the remainder of the patient's nevi was discussed with the patient today.  None of the patient's nevi, with the exception of the 3 noted above, meet threshold for biopsy today.  I emphasized the importance of performing monthly self-skin exams using the ABCDs of monitoring moles, which were reviewed with the patient today and an informational hand-out provided.  I also emphasized the importance of sun avoidance and sun protection with daily sunscreen use.    4. Seborrheic keratosis  Scattered on the patient's face, neck, trunk, and extremities, there are multiple tan- to light brown-colored, hyperkeratotic, stuck-on appearing papules of varying size and shape    Seborrheic Keratoses - the benign nature of these lesions was discussed with the patient today and reassurance provided.  No treatment is medically indicated for these lesions at this time.    5. Melasma  Head - Anterior (Face)  On her face, worst on her bilateral cheeks, there are multiple hyperpigmented macules coalescing into reticulated patches    Melasma - face.  The benign nature of this condition and treatment options were discussed extensively with the patient in the office today and reassurance provided.  Most importantly, I emphasized the importance of sun avoidance and sun protection with daily sunscreen use, including the use of a daily facial moisturizer with SPF at least 30.  In addition, I recommend topical retinoid therapy with Tretinoin 0.025% cream at night.   The risks, benefits, and side effects of this medication, including the redness, dryness, and irritation expected with its use, were discussed; the patient was instructed to begin use of Tretinoin 1-2 nights per week and increase to every other night, then every night as tolerated without excessive dryness and irritation.  She expressed understanding and is in agreement with this plan.    tretinoin (Retin-A) 0.025 % cream - Head - Anterior (Face)  Apply 1 Application topically once daily at bedtime. Start 1-2 nights per week 1-2 weeks, inc to every other night, then every night as tolerated    6. Seborrheic dermatitis  Head - Anterior (Face)  On the patient's face, mainly the glabella and bilateral eyebrows and perinasal creases, there are pink, scaly patches with whitish-yellowish, greasy scale    Seborrheic Dermatitis - face.  The potentially chronic and intermittently flaring nature of this condition and treatment options were discussed extensively with the patient today.  At this time, I recommend topical anti-fungal therapy with Ketoconazole 2% cream, which the patient was instructed to apply twice daily to the affected areas of the face.  The risks, benefits, and side effects of this medication were discussed.  The patient expressed understanding and is in agreement with this plan.    ketoconazole (NIZOral) 2 % cream - Head - Anterior (Face)  Apply topically 2 times a day. To affected areas of face    7. Diffuse photodamage of skin  Photodistributed  Diffuse photodamage with actinic changes with telangiectasia and mottled pigmentation in sun-exposed areas.    Photodamage.  The signs and symptoms of skin cancer were reviewed and the patient was advised to practice sun protection and sun avoidance, use daily sunscreen, and perform regular self skin exams.  Sun protection was discussed, including avoiding the mid-day sun, wearing a sunscreen with SPF at least 50, and stressing the need for reapplication of  sunscreen and applying more than they think they need.

## 2024-02-15 ENCOUNTER — OFFICE VISIT (OUTPATIENT)
Dept: GASTROENTEROLOGY | Facility: EXTERNAL LOCATION | Age: 59
End: 2024-02-15
Payer: COMMERCIAL

## 2024-02-15 DIAGNOSIS — K57.30 DIVERTICULOSIS OF LARGE INTESTINE WITHOUT DIVERTICULITIS: ICD-10-CM

## 2024-02-15 DIAGNOSIS — D12.3 BENIGN NEOPLASM OF TRANSVERSE COLON: ICD-10-CM

## 2024-02-15 DIAGNOSIS — R68.81 EARLY SATIETY: Primary | ICD-10-CM

## 2024-02-15 DIAGNOSIS — K44.9 HIATAL HERNIA: ICD-10-CM

## 2024-02-15 DIAGNOSIS — R63.4 ABNORMAL WEIGHT LOSS: ICD-10-CM

## 2024-02-15 DIAGNOSIS — K64.9 HEMORRHOIDS, UNSPECIFIED HEMORRHOID TYPE: ICD-10-CM

## 2024-02-15 DIAGNOSIS — K52.9 CHRONIC DIARRHEA: ICD-10-CM

## 2024-02-15 DIAGNOSIS — R63.4 WEIGHT LOSS: ICD-10-CM

## 2024-02-15 DIAGNOSIS — K29.70 GASTRITIS WITHOUT BLEEDING, UNSPECIFIED CHRONICITY, UNSPECIFIED GASTRITIS TYPE: ICD-10-CM

## 2024-02-15 PROCEDURE — 45380 COLONOSCOPY AND BIOPSY: CPT | Performed by: INTERNAL MEDICINE

## 2024-02-15 PROCEDURE — 43239 EGD BIOPSY SINGLE/MULTIPLE: CPT | Performed by: INTERNAL MEDICINE

## 2024-02-15 PROCEDURE — 0753T DGTZ GLS MCRSCP SLD LEVEL IV: CPT

## 2024-02-15 PROCEDURE — 45385 COLONOSCOPY W/LESION REMOVAL: CPT | Performed by: INTERNAL MEDICINE

## 2024-02-15 PROCEDURE — 1036F TOBACCO NON-USER: CPT | Performed by: INTERNAL MEDICINE

## 2024-02-15 PROCEDURE — 88305 TISSUE EXAM BY PATHOLOGIST: CPT

## 2024-02-15 NOTE — PROGRESS NOTES
EGD/colonoscopy performed today 2/15/2024 at the [facility] ([facility]).  See procedure report(s) under Media tab.

## 2024-02-16 ENCOUNTER — LAB REQUISITION (OUTPATIENT)
Dept: LAB | Facility: HOSPITAL | Age: 59
End: 2024-02-16
Payer: COMMERCIAL

## 2024-02-16 ENCOUNTER — APPOINTMENT (OUTPATIENT)
Dept: OCCUPATIONAL THERAPY | Facility: CLINIC | Age: 59
End: 2024-02-16
Payer: COMMERCIAL

## 2024-02-19 ENCOUNTER — APPOINTMENT (OUTPATIENT)
Dept: OCCUPATIONAL THERAPY | Facility: CLINIC | Age: 59
End: 2024-02-19
Payer: COMMERCIAL

## 2024-02-20 ENCOUNTER — TELEPHONE (OUTPATIENT)
Dept: GASTROENTEROLOGY | Facility: CLINIC | Age: 59
End: 2024-02-20
Payer: COMMERCIAL

## 2024-02-21 LAB
LAB AP ASR DISCLAIMER: NORMAL
LABORATORY COMMENT REPORT: NORMAL
LABORATORY COMMENT REPORT: NORMAL
PATH REPORT.FINAL DX SPEC: NORMAL
PATH REPORT.FINAL DX SPEC: NORMAL
PATH REPORT.GROSS SPEC: NORMAL
PATH REPORT.GROSS SPEC: NORMAL
PATH REPORT.MICROSCOPIC SPEC OTHER STN: NORMAL
PATH REPORT.MICROSCOPIC SPEC OTHER STN: NORMAL
PATH REPORT.RELEVANT HX SPEC: NORMAL
PATH REPORT.RELEVANT HX SPEC: NORMAL
PATH REPORT.TOTAL CANCER: NORMAL
PATH REPORT.TOTAL CANCER: NORMAL

## 2024-02-22 ENCOUNTER — HOSPITAL ENCOUNTER (OUTPATIENT)
Dept: RADIOLOGY | Facility: CLINIC | Age: 59
Discharge: HOME | End: 2024-02-22
Payer: COMMERCIAL

## 2024-02-22 DIAGNOSIS — K52.9 CHRONIC DIARRHEA: ICD-10-CM

## 2024-02-22 DIAGNOSIS — R63.4 WEIGHT LOSS: ICD-10-CM

## 2024-02-22 PROCEDURE — 2550000001 HC RX 255 CONTRASTS: Performed by: PHYSICIAN ASSISTANT

## 2024-02-22 PROCEDURE — 74177 CT ABD & PELVIS W/CONTRAST: CPT

## 2024-02-22 PROCEDURE — 74177 CT ABD & PELVIS W/CONTRAST: CPT | Performed by: RADIOLOGY

## 2024-02-22 RX ADMIN — IOHEXOL 75 ML: 350 INJECTION, SOLUTION INTRAVENOUS at 10:55

## 2024-02-23 ENCOUNTER — TREATMENT (OUTPATIENT)
Dept: OCCUPATIONAL THERAPY | Facility: CLINIC | Age: 59
End: 2024-02-23
Payer: COMMERCIAL

## 2024-02-23 DIAGNOSIS — M25.531 WRIST PAIN, ACUTE, RIGHT: ICD-10-CM

## 2024-02-26 ENCOUNTER — TELEPHONE (OUTPATIENT)
Dept: GASTROENTEROLOGY | Facility: CLINIC | Age: 59
End: 2024-02-26
Payer: COMMERCIAL

## 2024-02-26 DIAGNOSIS — M19.049 CMC ARTHRITIS: Primary | ICD-10-CM

## 2024-02-26 RX ORDER — TRAMADOL HYDROCHLORIDE 50 MG/1
50 TABLET ORAL 3 TIMES DAILY PRN
Qty: 50 TABLET | Refills: 2 | Status: SHIPPED | OUTPATIENT
Start: 2024-02-26 | End: 2024-04-18 | Stop reason: SDUPTHER

## 2024-02-27 ENCOUNTER — TELEPHONE (OUTPATIENT)
Dept: GASTROENTEROLOGY | Facility: CLINIC | Age: 59
End: 2024-02-27
Payer: COMMERCIAL

## 2024-02-27 NOTE — RESULT ENCOUNTER NOTE
One adenoma, repeat colonoscopy in 7 years.  Otherwise some inflammation in stomach and esophagus, no evidence of microscopic colitis, IBD, celiac.    Secure message sent through [employer].

## 2024-03-01 DIAGNOSIS — R63.4 WEIGHT LOSS: ICD-10-CM

## 2024-03-01 DIAGNOSIS — K52.9 CHRONIC DIARRHEA: Primary | ICD-10-CM

## 2024-03-01 NOTE — RESULT ENCOUNTER NOTE
Gastric wall thickening consistent with gastritis seen on EGD.  Nothing to explain weight loss.  Will order labs for hormone-secreting tumors.  Will send message through [employer].

## 2024-03-05 DIAGNOSIS — G43.709 CHRONIC MIGRAINE WITHOUT AURA WITHOUT STATUS MIGRAINOSUS, NOT INTRACTABLE: ICD-10-CM

## 2024-03-05 DIAGNOSIS — F32.5 MAJOR DEPRESSIVE DISORDER WITH SINGLE EPISODE, IN FULL REMISSION (CMS-HCC): ICD-10-CM

## 2024-03-05 RX ORDER — CITALOPRAM 40 MG/1
40 TABLET, FILM COATED ORAL DAILY
Qty: 30 TABLET | Refills: 0 | Status: SHIPPED | OUTPATIENT
Start: 2024-03-05 | End: 2024-04-01

## 2024-03-05 RX ORDER — AMITRIPTYLINE HYDROCHLORIDE 50 MG/1
TABLET, FILM COATED ORAL
Qty: 90 TABLET | Refills: 0 | Status: SHIPPED | OUTPATIENT
Start: 2024-03-05 | End: 2024-04-01

## 2024-03-06 ENCOUNTER — TELEPHONE (OUTPATIENT)
Dept: GASTROENTEROLOGY | Facility: CLINIC | Age: 59
End: 2024-03-06
Payer: COMMERCIAL

## 2024-03-06 DIAGNOSIS — K58.0 IRRITABLE BOWEL SYNDROME WITH DIARRHEA: ICD-10-CM

## 2024-03-06 NOTE — TELEPHONE ENCOUNTER
Pt states she seen in notes that you wanted her to have more blood work. If so, could you put the order in.

## 2024-03-07 RX ORDER — RIFAXIMIN 550 MG/1
TABLET ORAL
Qty: 42 TABLET | Refills: 0 | Status: SHIPPED | OUTPATIENT
Start: 2024-03-07 | End: 2024-03-30 | Stop reason: SDUPTHER

## 2024-03-08 ENCOUNTER — OFFICE VISIT (OUTPATIENT)
Dept: OPHTHALMOLOGY | Facility: CLINIC | Age: 59
End: 2024-03-08
Payer: COMMERCIAL

## 2024-03-08 DIAGNOSIS — Z96.1 PSEUDOPHAKIA, RIGHT EYE: ICD-10-CM

## 2024-03-08 DIAGNOSIS — Z98.49 POSTOPERATIVE CARE FOR CATARACT: Primary | ICD-10-CM

## 2024-03-08 DIAGNOSIS — H52.7 DISORDER OF REFRACTION: ICD-10-CM

## 2024-03-08 DIAGNOSIS — Z48.810 POSTOPERATIVE CARE FOR CATARACT: Primary | ICD-10-CM

## 2024-03-08 PROBLEM — H18.239 CORNEAL EDEMA, SECONDARY: Status: RESOLVED | Noted: 2024-01-26 | Resolved: 2024-03-08

## 2024-03-08 PROCEDURE — 99024 POSTOP FOLLOW-UP VISIT: CPT | Performed by: OPHTHALMOLOGY

## 2024-03-08 RX ORDER — METHYLPREDNISOLONE 4 MG/1
TABLET ORAL
COMMUNITY
Start: 2024-03-04

## 2024-03-08 ASSESSMENT — KERATOMETRY
OS_AXISANGLE_DEGREES: 90
OD_K2POWER_DIOPTERS: 43.50
OD_AXISANGLE_DEGREES: 80
OD_K1POWER_DIOPTERS: 41.75
OS_AXISANGLE2_DEGREES: 180
OS_K1POWER_DIOPTERS: 43.00
OD_AXISANGLE2_DEGREES: 170
OS_K2POWER_DIOPTERS: 43.00

## 2024-03-08 ASSESSMENT — ENCOUNTER SYMPTOMS
MUSCULOSKELETAL NEGATIVE: 0
CONSTITUTIONAL NEGATIVE: 0
ALLERGIC/IMMUNOLOGIC NEGATIVE: 0
NEUROLOGICAL NEGATIVE: 0
PSYCHIATRIC NEGATIVE: 0
EYES NEGATIVE: 0
GASTROINTESTINAL NEGATIVE: 0
CARDIOVASCULAR NEGATIVE: 0
RESPIRATORY NEGATIVE: 0
ENDOCRINE NEGATIVE: 0
HEMATOLOGIC/LYMPHATIC NEGATIVE: 0

## 2024-03-08 ASSESSMENT — VISUAL ACUITY
METHOD: SNELLEN - LINEAR
OS_CC+: -2
OD_SC+: -2
OS_CC: 20/30
OD_SC: 20/25

## 2024-03-08 ASSESSMENT — REFRACTION_WEARINGRX
OS_SPHERE: -3.00
OS_AXIS: 122
OS_CYLINDER: -0.25

## 2024-03-08 ASSESSMENT — EXTERNAL EXAM - LEFT EYE: OS_EXAM: NORMAL

## 2024-03-08 ASSESSMENT — PACHYMETRY
OD_CT(UM): 550
OS_CT(UM): 531

## 2024-03-08 ASSESSMENT — TONOMETRY
OD_IOP_MMHG: 12
IOP_METHOD: GOLDMANN APPLANATION
OS_IOP_MMHG: 16

## 2024-03-08 ASSESSMENT — EXTERNAL EXAM - RIGHT EYE: OD_EXAM: NORMAL

## 2024-03-08 NOTE — PROGRESS NOTES
Subjective   Patient ID: Nereida Conway is a 58 y.o. female.    Chief Complaint    Post-op Follow-up       HPI    POV OD final rx.  No new changes in health history or meds.  Vision OD is much improved.  No new problems or complaints.  Finished all drops (gtts) OD.    Last edited by Angelito Ashby MD on 3/8/2024 12:16 PM.        Current Outpatient Medications (Ophthalmology pharm classes)   Medication Sig Dispense Refill    ciprofloxacin (Ciloxan) 0.3 % ophthalmic solution Administer 1 drop into the right eye 4 times a day. STARTING THE DAY BEFORE SURGERY Do not start before January 24, 2024. 5 mL 1    prednisoLONE acetate (Pred-Forte) 1 % ophthalmic suspension Administer 1 drop into the right eye 4 times a day. STARTING THE DAY AFTER SURGERY Do not start before January 26, 2024. 5 mL 1     Current Outpatient Medications (Other)   Medication Sig Dispense Refill    acetaminophen (Tylenol Extra Strength) 500 mg tablet Take 2 tablets (1,000 mg) by mouth every 6 hours if needed for moderate pain (4 - 6).      amitriptyline (Elavil) 50 mg tablet TAKE ONE TABLET (50 mg) BY MOUTH once daily at bedtime 90 tablet 0    atorvastatin (Lipitor) 80 mg tablet TAKE ONE TABLET BY MOUTH EVERY DAY 90 tablet 0    cholecalciferol (Vitamin D-3) 50 mcg (2,000 unit) capsule take one capsule by mouth once a day (Patient taking differently: Take 1 capsule (50 mcg) by mouth early in the morning..) 90 capsule 3    citalopram (CeleXA) 40 mg tablet TAKE ONE TABLET BY MOUTH EVERY DAY 30 tablet 0    ketoconazole (NIZOral) 2 % cream Apply topically 2 times a day. To affected areas of face 60 g 11    lisinopriL-hydrochlorothiazide 20-12.5 mg tablet Take 1 tablet by mouth once daily. 90 tablet 3    methylPREDNISolone (Medrol Dospak) 4 mg tablets TAKE BY MOUTH. FOLLOW PACKAGE INSTRUCTIONS.      propranolol LA (Inderal LA) 120 mg 24 hr capsule Take 1 capsule (120 mg) by mouth once daily. (Patient taking differently: Take 1 capsule (120 mg) by mouth  once daily. HEADACHES) 90 capsule 3    rizatriptan (Maxalt) 10 mg tablet TAKE 1 TABLET BY MOUTH AT ONSET OF HEADACHE. MAY REPEAT EVERY 2 HOURS AS NEEDED. MAXIMUM OF 3 TABLETS IN 24 HOURS (Patient taking differently: Take 1 tablet (10 mg) by mouth 1 time if needed for migraine.) 30 tablet 0    topiramate (Topamax) 25 mg tablet Take 1 tablet (25 mg) by mouth 2 times a day. 1 TABLET EVERY MORNING AND 2 TABLETS AT BEDTIME      traMADol (Ultram) 50 mg tablet Take 1 tablet (50 mg) by mouth 3 times a day as needed for severe pain (7 - 10) for up to 150 doses. 50 tablet 2    tretinoin (Retin-A) 0.025 % cream Apply 1 Application topically once daily at bedtime. Start 1-2 nights per week 1-2 weeks, inc to every other night, then every night as tolerated 45 g 11    valACYclovir (Valtrex) 500 mg tablet TAKE ONE TABLET BY MOUTH EVERY DAY (Patient taking differently: Take 1 tablet (500 mg) by mouth once daily.) 30 tablet 11    Xifaxan 550 mg tablet take 1 tablet (550mg) by mouth 3 times a day 42 tablet 0       Objective   Base Eye Exam       Visual Acuity (Snellen - Linear)         Right Left Both    Dist sc 20/25 -2      Dist cc  20/30 -2     Near cc   J1              Tonometry (Goldmann Applanation, 12:19 PM)         Right Left    Pressure 12 16              Pupils         Dark Shape React APD    Right 5 Round 3 None    Left 5 Round 3 None              Extraocular Movement         Right Left     Full Full                  Additional Tests       Keratometry         K1 Axis K2 Axis    Right 41.75 170 43.50 80    Left 43.00 180 43.00 90                  Slit Lamp and Fundus Exam       External Exam         Right Left    External Normal Normal              Slit Lamp Exam         Right Left    Lids/Lashes 1+ Blepharitis, 1+ Dermatochalasis - upper lid 1+ Blepharitis, 1+ Dermatochalasis - upper lid    Conjunctiva/Sclera White and quiet normal bulbar and palepbral conjunctiva    Cornea Clear normal epi/stroma/endo and tear film     Anterior Chamber Deep and quiet ant. chamber deep and quiet    Iris iris normal iris normal    Lens Centered posterior chamber intraocular lens 1+ Nuclear sclerosis, 1+ Cortical cataract    Anterior Vitreous vitreous clear and normal vitreous clear and normal                  Refraction       Wearing Rx         Sphere Cylinder Axis    Right       Left -3.00 -0.25 122              Final Rx         Sphere Cylinder [place] Dist VA Add Near VA    Right +1.00 -1.00 160 20/20 +2.50 20/20    Left -2.75   20/20 +2.50 20/20      Expiration Date: 3/8/2026    Pupillary Distance: 60                    Assessment/Plan   Problem List Items Addressed This Visit          Eye/Vision problems    Disorder of refraction    Pseudophakia, right eye     Excellent result.  F/u 6 mos iop dilate (dil) and oct nerves.              Other    Postoperative care for cataract - Primary

## 2024-03-12 ENCOUNTER — LAB (OUTPATIENT)
Dept: LAB | Facility: LAB | Age: 59
End: 2024-03-12
Payer: COMMERCIAL

## 2024-03-29 DIAGNOSIS — K58.0 IRRITABLE BOWEL SYNDROME WITH DIARRHEA: ICD-10-CM

## 2024-03-30 DIAGNOSIS — K58.0 IRRITABLE BOWEL SYNDROME WITH DIARRHEA: ICD-10-CM

## 2024-04-01 DIAGNOSIS — F32.5 MAJOR DEPRESSIVE DISORDER WITH SINGLE EPISODE, IN FULL REMISSION (CMS-HCC): ICD-10-CM

## 2024-04-01 DIAGNOSIS — G43.709 CHRONIC MIGRAINE WITHOUT AURA WITHOUT STATUS MIGRAINOSUS, NOT INTRACTABLE: ICD-10-CM

## 2024-04-01 RX ORDER — AMITRIPTYLINE HYDROCHLORIDE 50 MG/1
TABLET, FILM COATED ORAL
Qty: 90 TABLET | Refills: 0 | Status: SHIPPED | OUTPATIENT
Start: 2024-04-01

## 2024-04-01 RX ORDER — CITALOPRAM 40 MG/1
40 TABLET, FILM COATED ORAL DAILY
Qty: 30 TABLET | Refills: 0 | Status: SHIPPED | OUTPATIENT
Start: 2024-04-01 | End: 2024-05-03

## 2024-04-18 DIAGNOSIS — M19.049 CMC ARTHRITIS: ICD-10-CM

## 2024-04-18 RX ORDER — TRAMADOL HYDROCHLORIDE 50 MG/1
50 TABLET ORAL 3 TIMES DAILY PRN
Qty: 50 TABLET | Refills: 1 | Status: SHIPPED | OUTPATIENT
Start: 2024-04-18 | End: 2024-05-17 | Stop reason: SDUPTHER

## 2024-04-19 ENCOUNTER — TELEPHONE (OUTPATIENT)
Dept: GASTROENTEROLOGY | Facility: CLINIC | Age: 59
End: 2024-04-19
Payer: COMMERCIAL

## 2024-04-19 DIAGNOSIS — K58.0 IRRITABLE BOWEL SYNDROME WITH DIARRHEA: ICD-10-CM

## 2024-04-25 DIAGNOSIS — E78.00 PURE HYPERCHOLESTEROLEMIA: ICD-10-CM

## 2024-04-25 DIAGNOSIS — I10 PRIMARY HYPERTENSION: ICD-10-CM

## 2024-04-25 RX ORDER — PROPRANOLOL HYDROCHLORIDE 120 MG/1
120 CAPSULE, EXTENDED RELEASE ORAL DAILY
Qty: 90 CAPSULE | Refills: 0 | Status: SHIPPED | OUTPATIENT
Start: 2024-04-25

## 2024-04-25 RX ORDER — ATORVASTATIN CALCIUM 80 MG/1
80 TABLET, FILM COATED ORAL DAILY
Qty: 90 TABLET | Refills: 0 | Status: SHIPPED | OUTPATIENT
Start: 2024-04-25

## 2024-04-26 ENCOUNTER — APPOINTMENT (OUTPATIENT)
Dept: PRIMARY CARE | Facility: CLINIC | Age: 59
End: 2024-04-26
Payer: COMMERCIAL

## 2024-04-30 ENCOUNTER — APPOINTMENT (OUTPATIENT)
Dept: PRIMARY CARE | Facility: CLINIC | Age: 59
End: 2024-04-30
Payer: COMMERCIAL

## 2024-05-02 DIAGNOSIS — F32.5 MAJOR DEPRESSIVE DISORDER WITH SINGLE EPISODE, IN FULL REMISSION (CMS-HCC): ICD-10-CM

## 2024-05-03 RX ORDER — CITALOPRAM 40 MG/1
40 TABLET, FILM COATED ORAL DAILY
Qty: 30 TABLET | Refills: 0 | Status: SHIPPED | OUTPATIENT
Start: 2024-05-03 | End: 2024-06-03

## 2024-05-06 ENCOUNTER — OFFICE VISIT (OUTPATIENT)
Dept: ORTHOPEDIC SURGERY | Facility: CLINIC | Age: 59
End: 2024-05-06
Payer: COMMERCIAL

## 2024-05-06 VITALS — HEIGHT: 62 IN | BODY MASS INDEX: 24.84 KG/M2 | WEIGHT: 135 LBS

## 2024-05-06 DIAGNOSIS — M19.049 CMC ARTHRITIS: Primary | ICD-10-CM

## 2024-05-06 PROCEDURE — 99213 OFFICE O/P EST LOW 20 MIN: CPT | Performed by: ORTHOPAEDIC SURGERY

## 2024-05-06 PROCEDURE — 20600 DRAIN/INJ JOINT/BURSA W/O US: CPT | Performed by: ORTHOPAEDIC SURGERY

## 2024-05-06 PROCEDURE — 1036F TOBACCO NON-USER: CPT | Performed by: ORTHOPAEDIC SURGERY

## 2024-05-06 RX ORDER — LIDOCAINE HYDROCHLORIDE 10 MG/ML
0.5 INJECTION INFILTRATION; PERINEURAL
Status: COMPLETED | OUTPATIENT
Start: 2024-05-06 | End: 2024-05-06

## 2024-05-06 RX ORDER — TRIAMCINOLONE ACETONIDE 40 MG/ML
20 INJECTION, SUSPENSION INTRA-ARTICULAR; INTRAMUSCULAR
Status: COMPLETED | OUTPATIENT
Start: 2024-05-06 | End: 2024-05-06

## 2024-05-06 RX ADMIN — TRIAMCINOLONE ACETONIDE 20 MG: 40 INJECTION, SUSPENSION INTRA-ARTICULAR; INTRAMUSCULAR at 16:31

## 2024-05-06 RX ADMIN — LIDOCAINE HYDROCHLORIDE 0.5 ML: 10 INJECTION INFILTRATION; PERINEURAL at 16:31

## 2024-05-06 ASSESSMENT — PAIN - FUNCTIONAL ASSESSMENT: PAIN_FUNCTIONAL_ASSESSMENT: NO/DENIES PAIN

## 2024-05-06 NOTE — PROGRESS NOTES
[facility]  Hand and Upper Extremity Service  Follow up visit         Follow up for: Injection in left thumb     Interval History: Very happy following right thumb CMC joint arthroplasty that was done last year but still has a abnormal sensory disturbances on the medial border of forearm which may be related to her preoperative regional block. She says this is a bit annoying but not bothersome enough to work this up further. She's very pleased with her thumb surgery and is back to all her normal activities with minimal discomfort. Her left hand continues to be painful and she presents today for injections. She is not ready to proceed with surgery on the left side. 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 Because of her intolerance to chewing, she's lost a fair amount of weight fairly rapidly.               Past medical history, medications, allergies, surgical history and review of systems are reviewed and otherwise unchanged when compared to last visit on 1/5/24.         Examination:  Constitutional: Oriented to person, place, and time.  Appears well-developed and well-nourished.  Head: Normocephalic and atraumatic.  Eyes: Pupils are equal, round, and reactive to light.  Cardiovascular: Intact distal pulses.  Pulmonary/Chest/Breast: Effort normal. No respiratory distress.  Neurological: Alert and oriented to person, place, and time.  Skin: Skin is warm and dry.  Psychiatric: normal mood and affect.  Behavior is normal.  Musculoskeletal: Right arm reveals healed surgical incision at the base of thumb. Excellent thumb CMC joint mobility and stability. Has had mild subjective changes in sensation in medial forearm but normal sensation in the hand and I'm unable to elicit a Tinel's sign along the medial antibrachia cutaneous nerve. Left hand reveals swelling and bony prominence at the thumb  CMC joint with positive CMC grind test. No thenar atrophy and normal sensation.               Personal Interpretation of Diagnostic studies: No new images obtained           Impression: Bilateral thumb CMC joint arthritis          Plan: We have her a left thumb CMC joint injection. She will continue to take the intermittent tramadol to help with her joint pains and she'll return as needed to repeat injections or when she's ready to schedule left thumb CMC joint arthroplasty.          In Office Procedures Performed: Left thumb CMC joint injection      S Inj/Asp: L thumb CMC on 5/6/2024 4:31 PM  Indications: pain  Details: 25 G needle, dorsal approach  Medications: 20 mg triamcinolone acetonide 40 mg/mL; 0.5 mL lidocaine 10 mg/mL (1 %)  Outcome: tolerated well, no immediate complications  Procedure, treatment alternatives, risks and benefits explained, specific risks discussed. Consent was given by the patient. Immediately prior to procedure a time out was called to verify the correct patient, procedure, equipment, support staff and site/side marked as required. Patient was prepped and draped in the usual sterile fashion.                Medications Prescribed: None              Follow up: As needed              Jurgen Munguia MD  [facility]  Department of Orthopaedic Surgery  Hand and Upper Extremity Reconstruction    Scribe Attestation  By signing my name below, ICassi , Scriblaura   attest that this documentation has been prepared under the direction and in the presence of Dr. Jurgen Munguia.    Dictation performed with the use of voice recognition software.  Syntax and grammatical errors may exist.   Simple: Patient demonstrates quick and easy understanding

## 2024-05-06 NOTE — OP NOTE
PREOPERATIVE DIAGNOSIS:  Bilateral thumb basilar joint arthritis.    POSTOPERATIVE DIAGNOSIS:  Bilateral thumb basilar joint arthritis.    OPERATION/PROCEDURE:  1. Left thumb carpometacarpal joint corticosteroid injection.  2. Right thumb carpometacarpal joint arthroplasty with flexor carpi  radialis tendon transfer.   3. Right wrist partial trapezoid resection.    SURGEON:  Jurgen Munguia MD.    ASSISTANT(S):  Dr. Raghav Elizabeth.    ANESTHESIA:  General with preoperative regional block.    INDICATIONS:  The patient is a 58-year-old female with symptomatic bilateral thumb  arthritis whom we have attempted to treat non-operatively for several  years with injections, but she is having diminishing returns from  injections and now presents for right thumb CMC joint arthroplasty.  Preoperatively, the right side was identified and marked for surgery.   She also has requested a left thumb CMC joint injection while under  anesthesia, so the left side was marked as well.  Informed consent  process was completed.     DESCRIPTION OF PROCEDURE:  The patient was brought to the operating room and placed supine on  the operating table.  A time-out procedure was performed to verify  the correct patient, procedure, and operative site.  Intravenous  antibiotics were administered.  General anesthetic was initiated by  the Anesthesia Service.     First beginning with the left side, we used aseptic technique to  inject the left thumb CMC joint with 20 mg of triamcinolone and 0.5  cc of 1% lidocaine.  A Band-Aid was applied to the injection site.     The right upper extremity was then prepped and draped in usual  sterile fashion.  Limb was exsanguinated and a tourniquet was  inflated to 250 mmHg.     We made a longitudinal incision extending distally from the radial  styloid out to the thumb metacarpal.  We dissected through the  subcutaneous tissue to identify, mobilize, and protect branches of  the radial sensory nerve.  We  developed the interval between the  adductor pollicis longus and extensor pollicis brevis.  The first  compartment was released along its dorsal margin to help further  develop this interval.  Deep retractors were placed between these 2  tendons.  The radial artery was identified in the floor of the  anatomic snuffbox and mobilized in a proximal and dorsal direction.  We then made an incision in the capsule extending from the scaphoid  tubercle out past the CMC joint.  Full-thickness capsular and  periosteal flaps were elevated.  We identified the CMC and ST joints.   Advanced arthritic changes were present at both locations.  The  trapezium was further exposed and then removed with a McGlamry  elevator.  We then inspected the articulation between the trapezoid  and the distal scaphoid tubercle and here again found advanced  arthritic changes.  Based on this finding, we elected to perform a  resection of the proximal trapezoid to decompress this joint.  Using  rongeurs, we removed about 3 mm of the proximal trapezoid to allow  smooth gliding of the scaphoid at this articulation.     We then used a 3 mm aicha to create a bone tunnel in a thumb  metacarpal base, exiting the articular surface.     Through a series of 2 incisions over the volar forearm, we harvested  a distally-based FCR tendon graft.  This was brought into the  trapeziectomy space.  We then split the tendon longitudinally,  passing 1 limb of the tendon graft through the metacarpal bone tunnel  and then securing the limbs to each other at the base of the thumb to  help suspend the thumb.  The redundant tendon graft material was then  imbricated upon itself and woven to each other and placed into the  trapeziectomy space as an interposition material.  The thumb CMC  joint was stable when axial load was applied.  The wound was  copiously irrigated.  The capsular flaps were then imbricated and  repaired.  All of the wounds were then closed in interrupted  fashion  and Steri-Strips were applied to the incisions.  A sterile bandage  was applied.  The tourniquet was deflated.  The patient was placed  into a well-padded, short-arm thumb spica splint allowing the MP  joint and IP joint to flex, but holding the CMC joint into a position  of abduction.  She was awoken from her anesthetic and transferred to  recovery room in stable condition.     Postoperatively, she will be discharged home once comfortable.  She  will remain in her splint until she returns to clinic in roughly 2  weeks, at which point we will transition her into a removable splint  that she can take off for hygiene purposes and gentle unresisted  active range of motion of the thumb.  We will begin formal therapy at  roughly 6 weeks.     I am attending for the procedure and I was present for the entire  case.       Jurgen Munguia MD    DD:  09/07/2023 14:21:27 EST  DT:  09/07/2023 15:09:46 EST  DICTATION NUMBER:  [id number]  INTERNAL JOB NUMBER:  [id number]    CC:  Jurgen Munguia MD, Fax: [phone]        Electronic Signatures:  Jurgen Munguia) (Signed on 07-Sep-2023 16:16)   Authored  Unsigned, Draft (SYS GENERATED) (Entered on 07-Sep-2023 15:09)   Entered    Last Updated: 07-Sep-2023 16:16 by Jurgen Munguia)

## 2024-05-07 ENCOUNTER — APPOINTMENT (OUTPATIENT)
Dept: PRIMARY CARE | Facility: CLINIC | Age: 59
End: 2024-05-07
Payer: COMMERCIAL

## 2024-05-17 DIAGNOSIS — M19.049 CMC ARTHRITIS: ICD-10-CM

## 2024-05-17 RX ORDER — TRAMADOL HYDROCHLORIDE 50 MG/1
50 TABLET ORAL 3 TIMES DAILY PRN
Qty: 50 TABLET | Refills: 3 | Status: SHIPPED | OUTPATIENT
Start: 2024-05-17

## 2024-05-27 DIAGNOSIS — G43.701 CHRONIC MIGRAINE WITHOUT AURA WITH STATUS MIGRAINOSUS, NOT INTRACTABLE: ICD-10-CM

## 2024-05-28 DIAGNOSIS — Z12.31 ENCOUNTER FOR SCREENING MAMMOGRAM FOR MALIGNANT NEOPLASM OF BREAST: Primary | ICD-10-CM

## 2024-05-28 RX ORDER — RIZATRIPTAN BENZOATE 10 MG/1
TABLET ORAL
Qty: 30 TABLET | Refills: 0 | Status: SHIPPED | OUTPATIENT
Start: 2024-05-28

## 2024-06-03 DIAGNOSIS — F32.5 MAJOR DEPRESSIVE DISORDER WITH SINGLE EPISODE, IN FULL REMISSION (CMS-HCC): ICD-10-CM

## 2024-06-03 RX ORDER — CITALOPRAM 40 MG/1
40 TABLET, FILM COATED ORAL DAILY
Qty: 30 TABLET | Refills: 1 | Status: SHIPPED | OUTPATIENT
Start: 2024-06-03

## 2024-06-17 ENCOUNTER — APPOINTMENT (OUTPATIENT)
Dept: RADIOLOGY | Facility: HOSPITAL | Age: 59
End: 2024-06-17
Payer: COMMERCIAL

## 2024-06-17 ENCOUNTER — APPOINTMENT (OUTPATIENT)
Dept: DERMATOLOGY | Facility: CLINIC | Age: 59
End: 2024-06-17
Payer: COMMERCIAL

## 2024-06-17 DIAGNOSIS — D48.5 NEOPLASM OF UNCERTAIN BEHAVIOR OF SKIN: ICD-10-CM

## 2024-06-17 PROCEDURE — 12031 INTMD RPR S/A/T/EXT 2.5 CM/<: CPT | Performed by: STUDENT IN AN ORGANIZED HEALTH CARE EDUCATION/TRAINING PROGRAM

## 2024-06-17 PROCEDURE — 11402 EXC TR-EXT B9+MARG 1.1-2 CM: CPT | Performed by: STUDENT IN AN ORGANIZED HEALTH CARE EDUCATION/TRAINING PROGRAM

## 2024-06-17 NOTE — PROGRESS NOTES
Excision Operative Note    Date of Surgery:  6/17/2024  Surgeon:  Soy Muñoz DO  Office Location: DO [address] [facility]  [address]  [address]  [facility] [address]  Dept: [phone]  Dept Fax: [phone]  Referring Provider: Claudio Chavez MD  [address] [place], [address]  [place] [address]    Subjective   Nereida Conway is a 59 y.o. female who presents for the following: Excision for neoplasm of uncertain behavior of skin.    According to the patient, the lesion has been present for approximately 6 months at the time of diagnosis.  The lesion is not causing symptoms.  The lesion has not been treated previously.    The patient does not have a pacemaker / defibrillator.  The patient does not have a heart valve / joint replacement.    The patient is not on blood thinners.   The patient does not have a history of hepatitis B or C.  The patient does not have a history of HIV.  The patient does not have a history of immunosuppression (e.g. organ transplantation, malignancy, medications)    Is it okay to leave a phone message with results? Yes  Who else may we leave results with: (name, relationship) Micky Alvarado(boyfriend)    The following portions of the chart were reviewed this encounter and updated as appropriate:         Assessment/Plan   Pre-procedure:   Obtained informed consent: written from patient  The surgical site was identified and confirmed with the patient.     Intra-operative:   Audible time out called at : 1:43 PM 06/17/24  by: Ling Navarrete MA   Verified patient name, birthdate, site, specimen bottle label & requisition.    The planned procedure(s) was again reviewed with the patient. The risks of bleeding, infection, nerve damage and scarring were reviewed. The patient identity, surgical site, and planned procedure(s) were verified.     Biopsy Accession Number: [id number]  Neoplasm of uncertain behavior of skin  Left Medial Upper Back    Skin  excision    Lesion length (cm):  0.5  Lesion width (cm):  0.5  Margin per side (cm):  0.3  Total excision diameter (cm):  1.1  Informed consent: discussed and consent obtained    Timeout: patient name, date of birth, surgical site, and procedure verified    Procedure prep:  Patient prepped in sterile fashion  Anesthesia: the lesion was anesthetized in a standard fashion    Anesthetic:  Lidocaine 2% with epinephrine  Instrument used: #15 blade    Hemostasis achieved with: electrodesiccation    Outcome: patient tolerated procedure well with no complications    Post-procedure details: sterile dressing applied and wound care instructions given    Dressing type: pressure dressing    Additional details:  The possible diagnoses were explained. Although the lesion is likely benign, the patient requests removal of the lesion because of the symptoms it is causing. Excision was discussed with the patient. The risks, benefits and potential adverse effects were reviewed. Discussion included but was not limited to the cure rate, relative cost, wound care requirements, activity restrictions, likely scar outcome and time to heal were reviewed. Alternative options including monitoring the lesion were discussed. The patient elected to proceed with excision.     Skin repair  Complexity:  Intermediate  Final length (cm):  2  Informed consent: discussed and consent obtained    Timeout: patient name, date of birth, surgical site, and procedure verified    Procedure prep:  Patient prepped in sterile fashion  Anesthesia: the lesion was anesthetized in a standard fashion    Anesthetic:  Lidocaine 2% with epinephrine  Reason for type of repair: reduce tension to allow closure    Undermining: edges undermined    Subcutaneous layers (deep stitches):   Suture size:  3-0  Suture type: Vicryl (polyglactin 910)    Stitches:  Buried vertical mattress  Fine/surface layer approximation (top stitches):   Suture size:  5-0  Suture type: fast-absorbing  plain gut    Stitches: simple running    Hemostasis achieved with: electrodesiccation  Outcome: patient tolerated procedure well with no complications    Post-procedure details: sterile dressing applied and wound care instructions given    Dressing type: pressure dressing      Specimen 1 - Dermatopathology- DERM LAB  Differential Diagnosis: Moderately Dysplastic Compound Nevus  Check Margins Yes/No?:  Yes  Comments:    Dermpath Lab: Routine Histopathology (formalin-fixed tissue)      Intermediate Linear Repair:  Given the location and size of the defect, it was determined that an intermediate layered linear closure was required to restore normal anatomy and function. The repair is an intermediate closure as two layers of sutures were required. The defect was undermined extensively at the level of the subcutaneous plane. Standing cutaneous cones were removed using Burow's triangles. The wound edges were brought into close approximation with buried vertical mattress sutures. The remainder of the wound was then closed with epidermal top sutures.    The final repair measured 2.0 cm    Wound care was discussed, and the patient was given written post-operative wound care instructions.      The patient will follow up with Soy Muñoz DO as needed for any post operative problems or concerns, and will follow up with their primary dermatologist as scheduled.

## 2024-06-19 DIAGNOSIS — G43.009 MIGRAINE WITHOUT AURA AND WITHOUT STATUS MIGRAINOSUS, NOT INTRACTABLE: Primary | ICD-10-CM

## 2024-06-19 LAB
LABORATORY COMMENT REPORT: NORMAL
PATH REPORT.FINAL DX SPEC: NORMAL
PATH REPORT.GROSS SPEC: NORMAL
PATH REPORT.MICROSCOPIC SPEC OTHER STN: NORMAL
PATH REPORT.RELEVANT HX SPEC: NORMAL
PATH REPORT.TOTAL CANCER: NORMAL

## 2024-06-19 RX ORDER — TOPIRAMATE 25 MG/1
TABLET ORAL
Qty: 90 TABLET | Refills: 1 | Status: SHIPPED | OUTPATIENT
Start: 2024-06-19

## 2024-07-05 ENCOUNTER — HOSPITAL ENCOUNTER (OUTPATIENT)
Dept: RADIOLOGY | Facility: HOSPITAL | Age: 59
Discharge: HOME | End: 2024-07-05
Payer: COMMERCIAL

## 2024-07-05 VITALS — BODY MASS INDEX: 22.86 KG/M2 | WEIGHT: 125 LBS

## 2024-07-05 DIAGNOSIS — M19.049 CMC ARTHRITIS: ICD-10-CM

## 2024-07-05 DIAGNOSIS — Z12.31 ENCOUNTER FOR SCREENING MAMMOGRAM FOR MALIGNANT NEOPLASM OF BREAST: ICD-10-CM

## 2024-07-05 PROCEDURE — 77067 SCR MAMMO BI INCL CAD: CPT

## 2024-07-05 RX ORDER — TRAMADOL HYDROCHLORIDE 50 MG/1
50 TABLET ORAL 3 TIMES DAILY PRN
Qty: 50 TABLET | Refills: 3 | Status: SHIPPED | OUTPATIENT
Start: 2024-07-05

## 2024-07-11 ENCOUNTER — TELEPHONE (OUTPATIENT)
Dept: PRIMARY CARE | Facility: CLINIC | Age: 59
End: 2024-07-11
Payer: COMMERCIAL

## 2024-07-11 DIAGNOSIS — R92.1 BREAST CALCIFICATION SEEN ON MAMMOGRAM: Primary | ICD-10-CM

## 2024-07-11 NOTE — TELEPHONE ENCOUNTER
----- Message from Lise Jeff sent at 7/11/2024 12:04 PM EDT -----  Please call her with mammogram results.  She has right breast calcifications and she needs diagnostic mammogram and ultrasound.  I placed the orders in.  Ask her to call and schedule  
Results given to pt and she scheduled U/S already.  
No

## 2024-07-16 ENCOUNTER — HOSPITAL ENCOUNTER (OUTPATIENT)
Dept: RADIOLOGY | Facility: CLINIC | Age: 59
Discharge: HOME | End: 2024-07-16
Payer: COMMERCIAL

## 2024-07-16 VITALS — WEIGHT: 125 LBS | HEIGHT: 62 IN | BODY MASS INDEX: 23 KG/M2

## 2024-07-16 DIAGNOSIS — I10 PRIMARY HYPERTENSION: ICD-10-CM

## 2024-07-16 DIAGNOSIS — R92.8 OTHER ABNORMAL AND INCONCLUSIVE FINDINGS ON DIAGNOSTIC IMAGING OF BREAST: ICD-10-CM

## 2024-07-16 DIAGNOSIS — E55.9 MILD VITAMIN D DEFICIENCY: ICD-10-CM

## 2024-07-16 PROCEDURE — 77065 DX MAMMO INCL CAD UNI: CPT | Mod: RT

## 2024-07-16 PROCEDURE — 77065 DX MAMMO INCL CAD UNI: CPT | Mod: RIGHT SIDE | Performed by: STUDENT IN AN ORGANIZED HEALTH CARE EDUCATION/TRAINING PROGRAM

## 2024-07-16 RX ORDER — LISINOPRIL AND HYDROCHLOROTHIAZIDE 12.5; 2 MG/1; MG/1
1 TABLET ORAL DAILY
Qty: 90 TABLET | Refills: 0 | Status: SHIPPED | OUTPATIENT
Start: 2024-07-16

## 2024-07-16 RX ORDER — NICOTINE 11MG/24HR
PATCH, TRANSDERMAL 24 HOURS TRANSDERMAL DAILY
Qty: 90 CAPSULE | Refills: 0 | Status: SHIPPED | OUTPATIENT
Start: 2024-07-16

## 2024-07-20 DIAGNOSIS — E78.00 PURE HYPERCHOLESTEROLEMIA: ICD-10-CM

## 2024-07-20 DIAGNOSIS — I10 PRIMARY HYPERTENSION: ICD-10-CM

## 2024-07-21 RX ORDER — PROPRANOLOL HYDROCHLORIDE 120 MG/1
120 CAPSULE, EXTENDED RELEASE ORAL DAILY
Qty: 90 CAPSULE | Refills: 0 | Status: SHIPPED | OUTPATIENT
Start: 2024-07-21

## 2024-07-21 RX ORDER — ATORVASTATIN CALCIUM 80 MG/1
80 TABLET, FILM COATED ORAL DAILY
Qty: 90 TABLET | Refills: 0 | Status: SHIPPED | OUTPATIENT
Start: 2024-07-21

## 2024-07-30 ENCOUNTER — APPOINTMENT (OUTPATIENT)
Dept: NEUROLOGY | Facility: CLINIC | Age: 59
End: 2024-07-30
Payer: COMMERCIAL

## 2024-07-30 VITALS
WEIGHT: 123 LBS | BODY MASS INDEX: 22.63 KG/M2 | HEART RATE: 63 BPM | SYSTOLIC BLOOD PRESSURE: 126 MMHG | DIASTOLIC BLOOD PRESSURE: 64 MMHG | HEIGHT: 62 IN

## 2024-07-30 DIAGNOSIS — G60.9 IDIOPATHIC PERIPHERAL NEUROPATHY: Primary | ICD-10-CM

## 2024-07-30 PROCEDURE — 99214 OFFICE O/P EST MOD 30 MIN: CPT | Performed by: PSYCHIATRY & NEUROLOGY

## 2024-07-30 PROCEDURE — 3008F BODY MASS INDEX DOCD: CPT | Performed by: PSYCHIATRY & NEUROLOGY

## 2024-07-30 PROCEDURE — 3074F SYST BP LT 130 MM HG: CPT | Performed by: PSYCHIATRY & NEUROLOGY

## 2024-07-30 PROCEDURE — 3078F DIAST BP <80 MM HG: CPT | Performed by: PSYCHIATRY & NEUROLOGY

## 2024-07-30 PROCEDURE — 1036F TOBACCO NON-USER: CPT | Performed by: PSYCHIATRY & NEUROLOGY

## 2024-07-30 RX ORDER — GABAPENTIN 100 MG/1
100 CAPSULE ORAL NIGHTLY
Qty: 30 CAPSULE | Refills: 2 | Status: SHIPPED | OUTPATIENT
Start: 2024-07-30 | End: 2025-07-30

## 2024-07-30 NOTE — PATIENT INSTRUCTIONS
You have had a progression in your peripheral neuropathy.  Try taking the low dose (100 mg) gabapentin in the early evening, and let our office know if you would need to increase the dosing.  Continue your other medications as written.  Follow up in 6 months.  I will review  the prior EMG test if applicable, and let you know the results.

## 2024-07-30 NOTE — PROGRESS NOTES
Subjective   Nereida Conway is a 59 y.o. right-handed female.  HPI  This is a 59 year old woman with migraines, peripheral neuropathy, she was last seen in 5/2023.  She has had bilateral hip replacements.  Her migraines are about the same- about 3 per month.  she treats them with Maxalt.  Her PN: distal lower extremities are more numb, tingling, can have cramping up her calves.  She has coldness in her toes.  Her balance is somewhat off.  Objective   Neurological Exam  Reflexes are 1/4 in the upper extremities, absent in the lower extremities.  Normal strength in the lower extremities.  Sensation is decreased to pinprick from the mid-foot to the toes bilaterally.  Normal vibratory and proprioception in the toes.  Normal pinprick in the fingers bilaterally.  Physical Exam  I personally reviewed laboratory, radiographic, and medical studies which were pertinent for nothing.    Assessment/Plan

## 2024-08-05 NOTE — PROGRESS NOTES
[facility]  Nereida Conway female   1965 59 y.o.  [id number]      Chief Complaint  New patient, biopsy consultation.    History Of Present Illness  Nereida Conway is a 59 y.o.  female seen in the breast center for biopsy consultation. She denies breast surgery or biopsy. She has family history of breast cancer in both sisters.    BREAST IMAGIN2024 Bilateral screening mammogram, BI-RADS Category 0, Right breast microcalcifications. 2024 RIGHT diagnostic mammogram, BI-RADS Category 4, 2 discrete groupings round calcifications in the central right breast at middle depth, increased from multiple priors. Stereotactic guided biopsy of 1 of these 2 groupings is recommended for further evaluation. An additional area of grouped coarse and round calcifications is seen at anterior depth which are probably benign. Recommend six-month mammographic follow-up if biopsy results  are benign.         REPRODUCTIVE HISTORY: menarche age 12, , first birth age 29,  8 months, no OCP's, menopause age unknown - s/p partial hysterectomy age 48, heterogeneously dense     FAMILY CANCER HISTORY:   Sister: Breast cancer, age 44  Sister (2): Breast cancer, age 49  Mother: Brain cancer, alive  Father: Prostate cancer  Paternal aunt: Colon cancer  Paternal uncle: Colon cancer    Review of Systems  Constitutional:  Negative for appetite change, fatigue, fever and unexpected weight change.   HENT:  Negative for ear pain, hearing loss, nosebleeds, sore throat and trouble swallowing.    Eyes:  Negative for discharge, itching and visual disturbance.   Breast: As stated in HPI.  Respiratory:  Negative for cough, chest tightness and shortness of breath.    Cardiovascular:  Negative for chest pain, palpitations and leg swelling.   Gastrointestinal:  Negative for abdominal pain, constipation, diarrhea and nausea.   Endocrine: Negative for cold intolerance and heat intolerance.   Genitourinary:   Negative for dysuria, frequency, hematuria, pelvic pain and vaginal bleeding.   Musculoskeletal:  Negative for arthralgias, back pain, gait problem, joint swelling and myalgias.   Skin:  Negative for color change and rash.   Allergic/Immunologic: Negative for environmental allergies and food allergies.   Neurological:  Negative for dizziness, tremors, speech difficulty, weakness, numbness and headaches.   Hematological:  Does not bruise/bleed easily.   Psychiatric/Behavioral:  Negative for agitation, dysphoric mood and sleep disturbance. The patient is not nervous/anxious.       Past Medical History  She has a past medical history of Alopecia, Anxiety and depression, Bronchitis, Encounter for immunization (2016), Genital herpes, GERD (gastroesophageal reflux disease), Hypercholesterolemia, Hypertension, Irritable bowel syndrome, Migraines, Osteoarthritis, Peripheral neuropathy, Pulmonary nodule, Right lower quadrant pain (2016), Sinusitis, Tear film insufficiency, unspecified laterality, and Unspecified disorder of refraction.    Surgical History  She has a past surgical history that includes Laparoscopy diagnostic / biopsy / aspiration / lysis (10/09/2014); Tonsillectomy (10/09/2014); Dilation and curettage of uterus (10/09/2014); Total hip arthroplasty (Bilateral, ); Rotator cuff repair (Right, );  section, low transverse; Hysterectomy; Colonoscopy; Other surgical history (2023); and Cataract extraction w/  intraocular lens implant.    Family History  Cancer-related family history includes Breast cancer (age of onset: 41) in her sister; Breast cancer (age of onset: 49) in her sister.     Social History  She reports that she quit smoking about 11 years ago. Her smoking use included cigarettes. She started smoking about 21 years ago. She has a 10 pack-year smoking history. She has never been exposed to tobacco smoke. She has never used smokeless tobacco. She reports current alcohol  use. She reports that she does not use drugs.    Allergies  Duloxetine, Gabapentin, Indomethacin, Prednisone, and Sulfamethoxazole    Medications  Current Outpatient Medications   Medication Instructions    acetaminophen (TYLENOL EXTRA STRENGTH) 1,000 mg, oral, Every 6 hours PRN    amitriptyline (Elavil) 50 mg tablet TAKE ONE TABLET (50 mg) BY MOUTH once daily at bedtime    atorvastatin (LIPITOR) 80 mg, oral, Daily    citalopram (CELEXA) 40 mg, oral, Daily    gabapentin (NEURONTIN) 100 mg, oral, Nightly    ketoconazole (NIZOral) 2 % cream Topical, 2 times daily, To affected areas of face    lisinopriL-hydrochlorothiazide 20-12.5 mg tablet 1 tablet, oral, Daily    propranolol LA (INDERAL LA) 120 mg, oral, Daily    rifAXIMin (XIFAXAN) 550 mg, oral, 3 times daily    rizatriptan (Maxalt) 10 mg tablet TAKE 1 TABLET BY MOUTH AT ONSET OF HEADACHE. MAY REPEAT EVERY 2 HOURS AS NEEDED. MAXIMUM OF 3 TABLETS IN 24 HOURS.    topiramate (Topamax) 25 mg tablet 1 TABLET EVERY MORNING AND 2 TABLETS AT BEDTIME    traMADol (ULTRAM) 50 mg, oral, 3 times daily PRN    tretinoin (Retin-A) 0.025 % cream 1 Application, Topical, Nightly, Start 1-2 nights per week 1-2 weeks, inc to every other night, then every night as tolerated    valACYclovir (Valtrex) 500 mg tablet TAKE ONE TABLET BY MOUTH EVERY DAY    Vitamin D3 50 mcg (2,000 unit) capsule oral, Daily       Last Recorded Vitals  Vitals:    08/13/24 0854   BP: 100/57   Pulse: 69       Physical Exam  Physical Exam  Patient is alert and oriented x3 and in an anxious mood. Her gait is steady and hand grasps are equal. Sclera is clear. The breasts are asymmetrical, left larger. The tissue is soft without palpable abnormalities, discrete nodules or masses. The skin and nipples appear normal. There is no cervical, supraclavicular or axillary lymphadenopathy. Heart rate and rhythm normal, S1 and S2 appreciated. The lungs are clear to auscultation bilaterally. Abdomen is soft and non-tender.       Relevant Results and Imaging  BI mammo right diagnostic 07/16/2024    Narrative  Interpreted By:  Mao Calabrese and Stephens Katherine  STUDY:  BI MAMMO RIGHT DIAGNOSTIC;  7/16/2024 10:25 am    ACCESSION NUMBER(S):  [id number]    ORDERING CLINICIAN:  JUDITH MOHR    INDICATION:  Recall from screening for right breast calcifications.    COMPARISON:  07/05/2024, 07/03/2023, and 03/01/2022    FINDINGS:  Density:  The breast tissue is heterogeneously dense, which may  obscure small masses.    Spot magnification views demonstrate 2 discrete groupings round  calcifications in the central right breast at middle depth, increased  from multiple priors. Biopsy of 1 of these 2 groupings is recommended  for further evaluation. An additional area of grouped coarse and  round calcifications is seen at anterior depth which are probably  benign.    Impression  Suspicious right breast calcifications. Further evaluation with  surgical consultation and stereotactic-guided biopsy is recommended.  Dr. Mao Calabrese explained the findings and recommendations to the  patient at the time of exam. A message was sent to the referring  practitioner at the time of this dictation regarding these findings  using the epic critical findings reporting system. A pre-procedure  form was filled out.    Additional calcifications are seen more anteriorly which are probably  benign. Recommend six-month mammographic follow-up if biopsy results  are benign.    Method of Detection: Category Sdbt - 3D Screening    BI-RADS CATEGORY:  BI-RADS Category:  4 Suspicious.  Recommendation:  Surgical Consultation and Biopsy.  Recommended Date:  Immediate.  Laterality:  Right.          I explained the results in depth, along with suggested explanation for follow up recommendations based on the testing results. BI-RADS Category 4    Visit Diagnosis  1. Calcification of right breast on mammography              Assessment/Plan      Plan:  Proceed to biopsy. A  breast radiology physician will perform the biopsy. Results are usually available in about 7-10 business days. I will call patient with results and instruct on next steps and plan.    Will order follow up imaging if deemed necessary at time  of discussing biopsy results.       Patient Discussion/Summary   I recommend a right mammogram guided biopsy. A breast radiology physician will perform the biopsy. Possible diagnoses include benign, atypical, or cancer as we discussed.  Bruising and mild discomfort after the biopsy is normal and will improve. I normally have results in 7-10 business days. I will call you with results, please have your phone handy to take my call.    IMPORTANT INFORMATION REGARDING YOUR RESULTS    If you receive medical information from My [facility] Personal Health Record (online chart) your results will be released into your chart. This means you may view or see results of your biopsy or procedure before I contact you directly. If this occurs, please call the office and we will discuss your results over the phone.    You can see your health information, review clinical summaries from office visits & test results online when you follow your health with MY  Chart, a personal health record. To sign up go to [email or web address]. If you need assistance with signing up or trouble getting into your account call [employer] Patient Line 24/7 at [phone].    My office phone number is [phone] if you need to get in touch with me or have additional questions or concerns. Thank you for choosing [facility] and trusting me as your healthcare provider. I look forward to seeing you again at your next office visit. I am honored to be a provider on your health care team and I remain dedicated to helping you achieve your health goals.Proceed to biopsy. A breast radiology physician will perform the biopsy. Results are usually available in about 7-10 business days. I will call patient with  results and instruct on next steps and plan.       Nereida Berkowitz, JESUS-CNP      <<----- Click to add NO significant Past Surgical History

## 2024-08-08 DIAGNOSIS — F32.5 MAJOR DEPRESSIVE DISORDER WITH SINGLE EPISODE, IN FULL REMISSION (CMS-HCC): ICD-10-CM

## 2024-08-09 RX ORDER — CITALOPRAM 40 MG/1
40 TABLET, FILM COATED ORAL DAILY
Qty: 90 TABLET | Refills: 1 | Status: SHIPPED | OUTPATIENT
Start: 2024-08-09

## 2024-08-12 ENCOUNTER — APPOINTMENT (OUTPATIENT)
Dept: ORTHOPEDIC SURGERY | Facility: CLINIC | Age: 59
End: 2024-08-12
Payer: COMMERCIAL

## 2024-08-12 VITALS — WEIGHT: 123 LBS | HEIGHT: 62 IN | BODY MASS INDEX: 22.63 KG/M2

## 2024-08-12 DIAGNOSIS — M19.049 ARTHRITIS OF CARPOMETACARPAL JOINT: Primary | ICD-10-CM

## 2024-08-12 PROCEDURE — 99213 OFFICE O/P EST LOW 20 MIN: CPT | Performed by: PHYSICIAN ASSISTANT

## 2024-08-12 PROCEDURE — 20600 DRAIN/INJ JOINT/BURSA W/O US: CPT | Performed by: PHYSICIAN ASSISTANT

## 2024-08-12 PROCEDURE — 1036F TOBACCO NON-USER: CPT | Performed by: PHYSICIAN ASSISTANT

## 2024-08-12 PROCEDURE — 3008F BODY MASS INDEX DOCD: CPT | Performed by: PHYSICIAN ASSISTANT

## 2024-08-12 RX ORDER — TRIAMCINOLONE ACETONIDE 40 MG/ML
20 INJECTION, SUSPENSION INTRA-ARTICULAR; INTRAMUSCULAR
Status: COMPLETED | OUTPATIENT
Start: 2024-08-12 | End: 2024-08-12

## 2024-08-12 RX ORDER — LIDOCAINE HYDROCHLORIDE 10 MG/ML
0.5 INJECTION INFILTRATION; PERINEURAL
Status: COMPLETED | OUTPATIENT
Start: 2024-08-12 | End: 2024-08-12

## 2024-08-12 NOTE — PROGRESS NOTES
Having recurrence pain of her left thumb CMC joint arthritis.  Previously had right CMC joint arthroplasty and is done well from this however having some stiffness and would like to get back into some therapy      Past medical history, medications, allergies, surgical history and review of systems are reviewed and otherwise unchanged when compared to last visit     Examination:  Constitutional: Oriented to person, place, and time.  Appears well-developed and well-nourished.  Head: Normocephalic and atraumatic.  Eyes: Pupils are equal, round, and reactive to light.  Cardiovascular: Intact distal pulses.  Pulmonary/Chest/Breast: Effort normal. No respiratory distress.  Neurological: Alert and oriented to person, place, and time.  Skin: Skin is warm and dry.  Psychiatric: normal mood and affect.  Behavior is normal.  Musculoskeletal: Right arm reveals healed surgical incision at the base of thumb.  Pain with palpation over the left thumb CMC joint.  Positive CMC grind test.    Impression: Bilateral thumb CMC joint arthritis          Plan: Left thumb CMC joint injection given today and tolerated well.  We have given her referral for occupational therapy for her right side she would like to get back into some therapy.  She may use the hands as tolerated will return to our office as needed       S Inj/Asp: L thumb CMC on 8/12/2024 9:01 AM  Indications: pain  Details: 25 G needle, dorsal approach  Medications: 20 mg triamcinolone acetonide 40 mg/mL; 0.5 mL lidocaine 10 mg/mL (1 %)  Outcome: tolerated well, no immediate complications  Procedure, treatment alternatives, risks and benefits explained, specific risks discussed. Consent was given by the patient. Immediately prior to procedure a time out was called to verify the correct patient, procedure, equipment, support staff and site/side marked as required. Patient was prepped and draped in the usual sterile fashion.           Angelita Esparza PA-C  Department of Orthopaedic  Surgery  [facility]    Dictation performed with the use of voice recognition software. Syntax and grammatical errors may exist.

## 2024-08-13 ENCOUNTER — HOSPITAL ENCOUNTER (OUTPATIENT)
Dept: RADIOLOGY | Facility: CLINIC | Age: 59
Discharge: HOME | End: 2024-08-13
Payer: COMMERCIAL

## 2024-08-13 ENCOUNTER — OFFICE VISIT (OUTPATIENT)
Dept: SURGICAL ONCOLOGY | Facility: CLINIC | Age: 59
End: 2024-08-13
Payer: COMMERCIAL

## 2024-08-13 VITALS
BODY MASS INDEX: 23.41 KG/M2 | HEART RATE: 69 BPM | DIASTOLIC BLOOD PRESSURE: 57 MMHG | WEIGHT: 128 LBS | SYSTOLIC BLOOD PRESSURE: 100 MMHG

## 2024-08-13 DIAGNOSIS — R92.8 ABNORMAL FINDING ON BREAST IMAGING: Primary | ICD-10-CM

## 2024-08-13 DIAGNOSIS — R92.1 CALCIFICATION OF RIGHT BREAST ON MAMMOGRAPHY: ICD-10-CM

## 2024-08-13 DIAGNOSIS — R92.8 OTHER ABNORMAL AND INCONCLUSIVE FINDINGS ON DIAGNOSTIC IMAGING OF BREAST: ICD-10-CM

## 2024-08-13 DIAGNOSIS — R92.1 CALCIFICATION OF RIGHT BREAST ON MAMMOGRAPHY: Primary | ICD-10-CM

## 2024-08-13 PROCEDURE — 1036F TOBACCO NON-USER: CPT

## 2024-08-13 PROCEDURE — 3074F SYST BP LT 130 MM HG: CPT

## 2024-08-13 PROCEDURE — 19081 BX BREAST 1ST LESION STRTCTC: CPT | Mod: RT

## 2024-08-13 PROCEDURE — 2720000007 HC OR 272 NO HCPCS

## 2024-08-13 PROCEDURE — 2500000005 HC RX 250 GENERAL PHARMACY W/O HCPCS: Performed by: RADIOLOGY

## 2024-08-13 PROCEDURE — 3078F DIAST BP <80 MM HG: CPT

## 2024-08-13 PROCEDURE — 99204 OFFICE O/P NEW MOD 45 MIN: CPT

## 2024-08-13 PROCEDURE — A4648 IMPLANTABLE TISSUE MARKER: HCPCS

## 2024-08-13 PROCEDURE — 77065 DX MAMMO INCL CAD UNI: CPT

## 2024-08-13 PROCEDURE — 99214 OFFICE O/P EST MOD 30 MIN: CPT

## 2024-08-13 ASSESSMENT — PAIN SCALES - GENERAL
PAINLEVEL_OUTOF10: 0 - NO PAIN
PAINLEVEL_OUTOF10: 0 - NO PAIN
PAINLEVEL: 0-NO PAIN
PAINLEVEL_OUTOF10: 1
PAINLEVEL_OUTOF10: 0 - NO PAIN

## 2024-08-13 NOTE — Clinical Note
Pressure held x 15 minutes, incision dry, steri strips intact and compression dressing applied. Ice pack applied.

## 2024-08-13 NOTE — PATIENT INSTRUCTIONS
I recommend a right mammogram guided biopsy. A breast radiology physician will perform the biopsy. Possible diagnoses include benign, atypical, or cancer as we discussed.  Bruising and mild discomfort after the biopsy is normal and will improve. I normally have results in 7-10 business days. I will call you with results, please have your phone handy to take my call.    IMPORTANT INFORMATION REGARDING YOUR RESULTS    If you receive medical information from My [facility] Personal Health Record (online chart) your results will be released into your chart. This means you may view or see results of your biopsy or procedure before I contact you directly. If this occurs, please call the office and we will discuss your results over the phone.    You can see your health information, review clinical summaries from office visits & test results online when you follow your health with MY  Chart, a personal health record. To sign up go to [email or web address]. If you need assistance with signing up or trouble getting into your account call [employer] Patient Line 24/7 at [phone].    My office phone number is [phone] if you need to get in touch with me or have additional questions or concerns. Thank you for choosing [facility] and trusting me as your healthcare provider. I look forward to seeing you again at your next office visit. I am honored to be a provider on your health care team and I remain dedicated to helping you achieve your health goals.Proceed to biopsy. A breast radiology physician will perform the biopsy. Results are usually available in about 7-10 business days. I will call patient with results and instruct on next steps and plan.

## 2024-08-13 NOTE — Clinical Note
Procedural steps explained and patient given opportunity to verbalize concerns and seek clarification.  Post procedure self-care and potential for bruising , hematoma, and pain reviewed.  Patient verbalizes understanding.  Patient offered aromatherapy, warm blankets and music. Guided imagery, touch and relaxation breathing to be used throughout the procedure.

## 2024-08-18 DIAGNOSIS — G43.009 MIGRAINE WITHOUT AURA AND WITHOUT STATUS MIGRAINOSUS, NOT INTRACTABLE: ICD-10-CM

## 2024-08-19 RX ORDER — TOPIRAMATE 25 MG/1
TABLET ORAL
Qty: 90 TABLET | Refills: 0 | Status: SHIPPED | OUTPATIENT
Start: 2024-08-19

## 2024-08-20 LAB
LABORATORY COMMENT REPORT: NORMAL
PATH REPORT.FINAL DX SPEC: NORMAL
PATH REPORT.GROSS SPEC: NORMAL
PATH REPORT.RELEVANT HX SPEC: NORMAL
PATH REPORT.TOTAL CANCER: NORMAL

## 2024-08-21 ENCOUNTER — TELEPHONE (OUTPATIENT)
Dept: SURGICAL ONCOLOGY | Facility: HOSPITAL | Age: 59
End: 2024-08-21

## 2024-08-21 ENCOUNTER — EVALUATION (OUTPATIENT)
Dept: OCCUPATIONAL THERAPY | Facility: CLINIC | Age: 59
End: 2024-08-21
Payer: COMMERCIAL

## 2024-08-21 DIAGNOSIS — M19.049 ARTHRITIS OF CARPOMETACARPAL JOINT: ICD-10-CM

## 2024-08-21 DIAGNOSIS — R92.1 CALCIFICATION OF RIGHT BREAST ON MAMMOGRAPHY: Primary | ICD-10-CM

## 2024-08-21 PROCEDURE — 97165 OT EVAL LOW COMPLEX 30 MIN: CPT | Mod: GO | Performed by: OCCUPATIONAL THERAPIST

## 2024-08-21 PROCEDURE — 97112 NEUROMUSCULAR REEDUCATION: CPT | Mod: GO | Performed by: OCCUPATIONAL THERAPIST

## 2024-08-21 NOTE — PROGRESS NOTES
"    Occupational Therapy Orthopedic Evaluation    Patient Name: Nereida Conway  MRN: [id number]  Today's Date: 8/21/2024  Time Calculation  Start Time: 0930  Stop Time: 1010  Time Calculation (min): 40 min    Insurance:  Visit number: 1 of 29  (1 visits used earlier this year)  Authorization info: none required  Insurance Type:  [employer]      Current Problem  1. Arthritis of carpometacarpal joint  Referral to Occupational Therapy    Follow Up In Occupational Therapy          Referred by: Danna Esparza PA-C  Referred for: R CMC arthritis/ weakness  DOS: 9/7/23    Fall risk:  none  Precautions:  none     Medical History Form: Reviewed (scanned into chart)    Subjective   Chief Complaint: \"Im just having some trouble due to weakness specifically with clipping nails and opening jars. Some pain and stiffness also. I have not been doing any exercises\"    Hand Dominance: Right    Pain:  2/10  Location: R volar/dorsal thumb MP  Description: aching, dull, weak  Aggravating Factors:   inactivity, stress, weather, cold  Relieving Factors:  heat    Previous Interventions/Treatments:   Occupational Therapy earlier this year    Prior Level of Function (PLOF)  Patient previously independent with all ADLs/IADLs    ADL/IADL impairments  Home mgt, Meal prep, and Work    Patients Living Environment: Reviewed and no concern  Lives with: Boyfriend  Primary Language: English  Patient's Goal(s) for Therapy:  \"Improve my strength and reduce my pain\"    Red Flags: Do you have any of the following? No  Fever/chills, unexplained weight changes, dizziness/fainting, unexplained change in bowel or bladder functions, unexplained malaise or muscle weakness, night pain/sweats, numbness or tingling    Objective   HAND STRENGTH (Lbs)   R L   Dynamometer  55 70   Lateral Pinch 14 16   3jaw Pinch 12 14     R thumb opposition AROM:   SF P1      Outcome Measures: Today I really like to have someone you want patient to [employer] but it was just         " QUICK DASH:  13.64%     Home Program:  Exercise Sets/Reps Comments   Ylw putty lateral and 3pt pinch     Activity modification As needed    Thumb spica for work As needed    Massage 2-3x/day 5-10minutes                                   Treatment Today  - OT eval completed  - Neuro re-ed:   - ASL AROM within fluidotherapy for desensitization and re-ed of digital proprioception x 10min    EDUCATION: Home exercise program, plan of care, activity modifications, joint protection, pain management, and injury pathology       Assessment:   Pt is 59 year old female presenting with R thumb MP joint weakness and pain and is presenting today for evaluation with complaints of decreased strength and increased pain.  History of R thumb CMC arthroplasty September 2023. As a result of these impairments pt is having difficulty with home management, meal prep tasks, using nail slippers, difficulty lifting boxes and manipulating clothes at work.  Without skilled intervention patient is at risk for further loss of ROM, loss of strength, reduced ADL/IADL function, and is at risk for requiring caregiver assistance for self care completion.  Patient to benefit from skilled services to address the impairments found in order to return to independent prior level of function.      Level of Complexity  LOW complexity    OT Rehab Potential  Excellent      Plan:     Planned Interventions include: therapeutic exercise, self-care home management, manual therapy, therapeutic activities, , neuromuscular coordination, vasopneumatic, dry needling, and orthosis fabrication.  Frequency: 1 x Week  Duration: 6 Weeks    Goals: Set and discussed today         1) Patient will demo at least 5lb improved R  strength to open jars for meal prep, in 6 weeks         2) Patient will demo 2lb improved lateral pinch strength to manipulates nail clippers, in 6 weeks.         3) Patient will be independent of conservative mgt of diagnosis with work tasks, in 4  weeks        Plan of care was developed with input and agreement by the patient.      Jovanna Epstein, OT

## 2024-08-21 NOTE — TELEPHONE ENCOUNTER
"Result Communication    Resulted Orders   Surgical Pathology Exam   Result Value Ref Range    Case Report       Surgical Pathology                                Case: [id number]                                  Authorizing Provider:  JAY Mishra   Collected:           08/13/2024 1057              Ordering Location:     [facility]       Received:            08/13/2024 1144                                     Center                                                                       Pathologist:           Loyda Correa MD                                                            Specimen:    BREAST CORE BIOPSY RIGHT, Right breast calcifications                                      FINAL DIAGNOSIS       A.  RIGHT BREAST CALCIFICATIONS, STEREOTACTIC CORE NEEDLE BIOPSY:  --APOCRINE METAPLASIA AND CYST WITH ASSOCIATED MICROCALCIFICATIONS  --DILATED DUCTS AND FOCAL PERIDUCTAL CHRONIC INFLAMMATION              By the signature on this report, the individual or group listed as making the Final Interpretation/Diagnosis certifies that they have reviewed this case.       Clinical History       Right breast stereotactic guided vacuum assisted biopsy of calcifications. Time in formalin @ 1100 on 08/13/2024      Gross Description       A: Received in formalin, labeled with the patient´s name and hospital number and \"right breast calcifications\", are multiple irregular cylindrical segments of yellow-white fatty soft tissue aggregating to 2.8 x 1.2 x 0.4 cm.  The specimen is submitted in toto in one cassette.  DMB     Ischemia time: 8/13/2024 1057  This specimen was placed into formalin at: 8/13/2024 1100            10:46 AM      Results were successfully communicated with the patient and they acknowledged their understanding. 6 month diagnostic mammogram and high risk evaluation office visit.    "

## 2024-08-26 NOTE — PROGRESS NOTES
Subjective   Patient ID: Nereida Conway is a 59 y.o. 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    HPI the patient is presented for follow-up.  She has not been seen in the office for 2 years.  Overall the patient feels stable.  Her diarrhea much improved with Xifaxan.  She had lost 13 pounds since she had diarrhea.  Her colonoscopy in February was normal.    She was seen by neurology for peripheral neuropathy and was started on gabapentin which is effective.    Complaining of arthralgia of her joints.  She has been having severe left TMJ pain and was recommended a surgery.  She has had pain of left thumb for which she had steroidal injection on 8/12/2024.  Her right thumb pain much improved after she had joint arthroplasty.  Stated she is very happy with an outcome.    Her depression is stable.  She sleeps well through the night.  Migraines are well-controlled with current regimen.  Her blood pressure is running a little low since she had lost weight.  Has been having occasional dizziness.    Review of Systems   Constitutional:  Negative for appetite change, chills, fatigue and fever.   HENT:  Negative for congestion, ear pain, facial swelling, hearing loss, nosebleeds and sore throat.    Eyes:  Negative for pain, discharge and visual disturbance.   Respiratory:  Negative for cough, choking, chest tightness, shortness of breath and wheezing.    Cardiovascular:  Negative for chest pain, palpitations and leg swelling.   Gastrointestinal:  Negative for abdominal distention, abdominal pain, anal bleeding, constipation, diarrhea, nausea and vomiting.   Endocrine: Negative for cold intolerance, heat intolerance, polydipsia, polyphagia and polyuria.   Genitourinary:  Negative for difficulty urinating, frequency, hematuria and urgency.   Musculoskeletal:   "Positive for arthralgias. Negative for gait problem, joint swelling and myalgias.   Skin:  Negative for color change and rash.   Neurological:  Negative for dizziness, tremors, syncope, weakness, numbness and headaches.   Psychiatric/Behavioral:  Negative for behavioral problems, confusion, sleep disturbance and suicidal ideas. The patient is not nervous/anxious.        Objective   /64   Temp 36.3 °C (97.3 °F) (Temporal)   Ht 1.537 m (5' 0.5\")   Wt 58.1 kg (128 lb)   LMP  (LMP Unknown)   Breastfeeding No   BMI 24.59 kg/m²     Physical Exam  Constitutional:       General: She is not in acute distress.     Appearance: Normal appearance.   HENT:      Head: Normocephalic and atraumatic.      Nose: Nose normal.   Eyes:      Extraocular Movements: Extraocular movements intact.      Conjunctiva/sclera: Conjunctivae normal.      Pupils: Pupils are equal, round, and reactive to light.   Cardiovascular:      Rate and Rhythm: Normal rate and regular rhythm.      Pulses: Normal pulses.      Heart sounds: Normal heart sounds.   Pulmonary:      Effort: Pulmonary effort is normal.      Breath sounds: Normal breath sounds.   Abdominal:      General: Bowel sounds are normal.      Palpations: Abdomen is soft.   Musculoskeletal:         General: Normal range of motion.      Cervical back: Normal range of motion and neck supple.   Neurological:      General: No focal deficit present.      Mental Status: She is alert and oriented to person, place, and time.   Psychiatric:         Mood and Affect: Mood normal.         Behavior: Behavior normal.         Thought Content: Thought content normal.         Judgment: Judgment normal.         Assessment/Plan     Arthralgia, osteoarthritis of multiple joints   Low back pain, left hip pain, right TMJ, right and left thumb CMC joint pains  s/p hip replacement   S/p right CMC joint arthroplasty   Right TMJ surgery will be scheduled  Failed Neurontin, tramadol, Cymbalta, and " indomethacin  Currently on tramadol 50 mg 3 times daily given by Dr. Munguia  Recommended OT  Follow-up with orthopedic surgery Dr. Munguia    HTN  Blood pressure is low due to recent weight loss  Decrease lisinopril 20 mg to 10 mg  Start lisinopril-HCTZ 10-12.5 mg daily  Monitor blood pressure daily  If low, will discontinue hydrochlorothiazide and continue lisinopril  No added salt diet, do not add salt to your food  Try to exercise every other day for 30 minutes    IBS with diarrhea  Stable  Xifaxan as needed  Take probiotics daily  Colonoscopy February 2024, repeat in 7 years  Follow-up with gastroenterology     Peripheral neuropathy  Started on Gabapentin 100 mg hs   Follow up with neurology [place]     leg spasms   Improved  Off of magnesium 400 mg  Drink plenty of fluids     Former smoker  quit in 2015  4 mm pulmonary nodule right lung  Repeat CT chest 3/18/2023    Dyslipidemia  Continue Atorvastatin 80 mg at bed time  Continue with the low fat, low cholesterol diet  I recommend Mediterranean diet, which include fish, chicken, vegetables and olive oil  Exercise daily for 30 minutes at least 3 times a week     Migraines  Stable  Continue Amitriptyline 50 mg at bed time   Continue Propranolol 120 mg once daily  Maxalt as needed  Follow-up with neurology    TMJ  Surgery recommended  Follow up with oral surgery Dr. Delgado      Depression and anxiety  stable  Continue Amitriptyline 50 mg t bed time  Take Citalopram 40 mg once daily     Herpes simplex 2   Take Valacyclovir as needed for flare ups.     Abnormal mammogram 7/5/2024  Biopsy benign    Vitamin D deficiency  Continue vitamin D 2000 units daily    Body mass index is 24.59 kg/m².  Patient has lost 13 pounds    Health maintenance  Mammogram 7/5/2024  Colonoscopy 2024, repeat in 7 years    We obtained blood work  I will call with results    Follow-up in 3 months

## 2024-08-27 RX ORDER — VALSARTAN 320 MG/1
320 TABLET ORAL
COMMUNITY
End: 2024-08-29 | Stop reason: ALTCHOICE

## 2024-08-27 RX ORDER — OMEPRAZOLE 40 MG/1
CAPSULE, DELAYED RELEASE ORAL
COMMUNITY
Start: 2022-12-13 | End: 2024-08-29 | Stop reason: ALTCHOICE

## 2024-08-27 RX ORDER — METRONIDAZOLE 500 MG/1
TABLET ORAL
COMMUNITY
Start: 2022-11-15 | End: 2024-08-29 | Stop reason: ALTCHOICE

## 2024-08-27 RX ORDER — OXYCODONE AND ACETAMINOPHEN 5; 325 MG/1; MG/1
1 TABLET ORAL
COMMUNITY
Start: 2024-08-03 | End: 2024-08-28 | Stop reason: ALTCHOICE

## 2024-08-27 RX ORDER — PIROXICAM 20 MG/1
1 CAPSULE ORAL
COMMUNITY
Start: 2024-04-30

## 2024-08-27 RX ORDER — IBUPROFEN 800 MG/1
TABLET ORAL
COMMUNITY
Start: 2024-04-09 | End: 2024-08-28 | Stop reason: ALTCHOICE

## 2024-08-28 ASSESSMENT — PROMIS GLOBAL HEALTH SCALE
CARRYOUT_PHYSICAL_ACTIVITIES: COMPLETELY
RATE_GENERAL_HEALTH: GOOD
RATE_PHYSICAL_HEALTH: GOOD
RATE_AVERAGE_FATIGUE: MILD
RATE_MENTAL_HEALTH: VERY GOOD
EMOTIONAL_PROBLEMS: NEVER
RATE_SOCIAL_SATISFACTION: EXCELLENT
CARRYOUT_SOCIAL_ACTIVITIES: EXCELLENT
RATE_AVERAGE_PAIN: 5
RATE_QUALITY_OF_LIFE: GOOD

## 2024-08-29 ENCOUNTER — APPOINTMENT (OUTPATIENT)
Dept: PRIMARY CARE | Facility: CLINIC | Age: 59
End: 2024-08-29
Payer: COMMERCIAL

## 2024-08-29 VITALS
BODY MASS INDEX: 24.17 KG/M2 | SYSTOLIC BLOOD PRESSURE: 100 MMHG | DIASTOLIC BLOOD PRESSURE: 64 MMHG | TEMPERATURE: 97.3 F | WEIGHT: 128 LBS | HEIGHT: 61 IN

## 2024-08-29 DIAGNOSIS — K52.9 CHRONIC DIARRHEA: ICD-10-CM

## 2024-08-29 DIAGNOSIS — E53.8 VITAMIN B12 DEFICIENCY: ICD-10-CM

## 2024-08-29 DIAGNOSIS — E55.9 MILD VITAMIN D DEFICIENCY: ICD-10-CM

## 2024-08-29 DIAGNOSIS — F41.9 ANXIETY: ICD-10-CM

## 2024-08-29 DIAGNOSIS — M26.622 ARTHRALGIA OF LEFT TEMPOROMANDIBULAR JOINT: ICD-10-CM

## 2024-08-29 DIAGNOSIS — I10 PRIMARY HYPERTENSION: Primary | ICD-10-CM

## 2024-08-29 DIAGNOSIS — E78.2 MIXED HYPERLIPIDEMIA: ICD-10-CM

## 2024-08-29 DIAGNOSIS — G60.9 IDIOPATHIC PERIPHERAL NEUROPATHY: ICD-10-CM

## 2024-08-29 DIAGNOSIS — F32.5 MAJOR DEPRESSIVE DISORDER WITH SINGLE EPISODE, IN FULL REMISSION (CMS-HCC): ICD-10-CM

## 2024-08-29 DIAGNOSIS — R63.4 WEIGHT LOSS: ICD-10-CM

## 2024-08-29 PROBLEM — H25.10 NUCLEAR SENILE CATARACT: Status: ACTIVE | Noted: 2024-08-29

## 2024-08-29 PROBLEM — F17.210 CIGARETTE NICOTINE DEPENDENCE WITHOUT COMPLICATION: Status: RESOLVED | Noted: 2023-03-06 | Resolved: 2024-08-29
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
## 2024-08-29 LAB
CENTROMERE B AB SER-ACNC: <0.2 AI
CHROMATIN AB SERPL-ACNC: <0.2 AI
DSDNA AB SER-ACNC: 1 IU/ML
ENA JO1 AB SER QL IA: <0.2 AI
ENA RNP AB SER IA-ACNC: <0.2 AI
ENA SCL70 AB SER QL IA: <0.2 AI
ENA SM AB SER IA-ACNC: <0.2 AI
ENA SM+RNP AB SER QL IA: <0.2 AI
ENA SS-A AB SER IA-ACNC: <0.2 AI
ENA SS-B AB SER IA-ACNC: <0.2 AI
ERYTHROCYTE [SEDIMENTATION RATE] IN BLOOD BY WESTERGREN METHOD: <1 MM/H (ref 0–30)
RHEUMATOID FACT SER NEPH-ACNC: <10 IU/ML (ref 0–15)
RIBOSOMAL P AB SER-ACNC: <0.2 AI
URATE SERPL-MCNC: 4.7 MG/DL (ref 2.3–6.7)

## 2024-08-29 PROCEDURE — 82306 VITAMIN D 25 HYDROXY: CPT | Performed by: PHYSICIAN ASSISTANT

## 2024-08-29 PROCEDURE — 86235 NUCLEAR ANTIGEN ANTIBODY: CPT

## 2024-08-29 PROCEDURE — 3008F BODY MASS INDEX DOCD: CPT | Performed by: PHYSICIAN ASSISTANT

## 2024-08-29 PROCEDURE — 86038 ANTINUCLEAR ANTIBODIES: CPT

## 2024-08-29 PROCEDURE — 3074F SYST BP LT 130 MM HG: CPT | Performed by: PHYSICIAN ASSISTANT

## 2024-08-29 PROCEDURE — 85652 RBC SED RATE AUTOMATED: CPT

## 2024-08-29 PROCEDURE — 3078F DIAST BP <80 MM HG: CPT | Performed by: PHYSICIAN ASSISTANT

## 2024-08-29 PROCEDURE — 85025 COMPLETE CBC W/AUTO DIFF WBC: CPT

## 2024-08-29 PROCEDURE — 99214 OFFICE O/P EST MOD 30 MIN: CPT | Performed by: PHYSICIAN ASSISTANT

## 2024-08-29 PROCEDURE — 86431 RHEUMATOID FACTOR QUANT: CPT

## 2024-08-29 PROCEDURE — 86225 DNA ANTIBODY NATIVE: CPT

## 2024-08-29 PROCEDURE — 84550 ASSAY OF BLOOD/URIC ACID: CPT

## 2024-08-29 PROCEDURE — 83721 ASSAY OF BLOOD LIPOPROTEIN: CPT | Performed by: PHYSICIAN ASSISTANT

## 2024-08-29 PROCEDURE — 1036F TOBACCO NON-USER: CPT | Performed by: PHYSICIAN ASSISTANT

## 2024-08-29 RX ORDER — LISINOPRIL AND HYDROCHLOROTHIAZIDE 10; 12.5 MG/1; MG/1
1 TABLET ORAL DAILY
Qty: 100 TABLET | Refills: 3 | Status: SHIPPED | OUTPATIENT
Start: 2024-08-29 | End: 2025-10-03

## 2024-08-29 ASSESSMENT — ENCOUNTER SYMPTOMS
CHOKING: 0
OCCASIONAL FEELINGS OF UNSTEADINESS: 0
TREMORS: 0
COUGH: 0
ABDOMINAL PAIN: 0
ABDOMINAL DISTENTION: 0
CHILLS: 0
DIZZINESS: 0
DEPRESSION: 0
FACIAL SWELLING: 0
CONSTIPATION: 0
HEMATURIA: 0
APPETITE CHANGE: 0
COLOR CHANGE: 0
WEAKNESS: 0
ANAL BLEEDING: 0
NERVOUS/ANXIOUS: 0
PALPITATIONS: 0
FREQUENCY: 0
SHORTNESS OF BREATH: 0
LOSS OF SENSATION IN FEET: 1
ARTHRALGIAS: 1
DIARRHEA: 0
EYE PAIN: 0
EYE DISCHARGE: 0
NUMBNESS: 0
FEVER: 0
POLYDIPSIA: 0
VOMITING: 0
JOINT SWELLING: 0
WHEEZING: 0
HEADACHES: 0
SORE THROAT: 0
SLEEP DISTURBANCE: 0
CONFUSION: 0
MYALGIAS: 0
DIFFICULTY URINATING: 0
FATIGUE: 0
POLYPHAGIA: 0
NAUSEA: 0
CHEST TIGHTNESS: 0

## 2024-08-29 ASSESSMENT — COLUMBIA-SUICIDE SEVERITY RATING SCALE - C-SSRS
6. HAVE YOU EVER DONE ANYTHING, STARTED TO DO ANYTHING, OR PREPARED TO DO ANYTHING TO END YOUR LIFE?: NO
2. HAVE YOU ACTUALLY HAD ANY THOUGHTS OF KILLING YOURSELF?: NO
1. IN THE PAST MONTH, HAVE YOU WISHED YOU WERE DEAD OR WISHED YOU COULD GO TO SLEEP AND NOT WAKE UP?: NO

## 2024-08-29 ASSESSMENT — PAIN SCALES - GENERAL: PAINLEVEL: 4

## 2024-08-30 LAB
25(OH)D3 SERPL-MCNC: 38 NG/ML (ref 30–100)
ALT SERPL W P-5'-P-CCNC: 44 U/L (ref 16–63)
ANA SER QL HEP2 SUBST: NEGATIVE
ANION GAP SERPL CALC-SCNC: 11 MMOL/L (ref 10–20)
AST SERPL W P-5'-P-CCNC: 22 U/L (ref 15–37)
BASOPHILS # BLD AUTO: 0.02 X10*3/UL (ref 0.1–1.6)
BASOPHILS NFR BLD AUTO: 0.27 % (ref 0–0.3)
BUN SERPL-MCNC: 16 MG/DL (ref 7–18)
CALCIUM SERPL-MCNC: 9.3 MG/DL (ref 8.5–10.1)
CHLORIDE SERPL-SCNC: 103 MMOL/L (ref 98–107)
CHOLEST SERPL-MCNC: 162 MG/DL (ref 0–199)
CHOLESTEROL/HDL RATIO: 2.5 (ref 4.2–7)
CO2 SERPL-SCNC: 29 MMOL/L (ref 21–32)
CREAT SERPL-MCNC: 1.01 MG/DL (ref 0.6–1.1)
EGFRCR SERPLBLD CKD-EPI 2021: 64 ML/MIN/1.73M*2
EOSINOPHIL # BLD AUTO: 0.24 X10*3/UL (ref 0.04–0.5)
EOSINOPHIL NFR BLD AUTO: 3.28 % (ref 0.7–7)
ERYTHROCYTE [DISTWIDTH] IN BLOOD BY AUTOMATED COUNT: 13 % (ref 11.5–14.5)
GLUCOSE SERPL-MCNC: 87 MG/DL (ref 74–100)
HCT VFR BLD AUTO: 41.3 % (ref 36.6–46.6)
HDLC SERPL-MCNC: 66 MG/DL (ref 40–59)
HGB BLD-MCNC: 13.58 G/DL (ref 12–15.4)
IS PATIENT FASTING: YES
LDLC SERPL DIRECT ASSAY-MCNC: 70 MG/DL (ref 0–100)
LYMPHOCYTES # BLD AUTO: 1.34 X10*3/UL (ref 0–6)
LYMPHOCYTES NFR BLD AUTO: 18.28 % (ref 20.5–51.1)
MCH RBC QN AUTO: 34 PG (ref 26–32)
MCHC RBC AUTO-ENTMCNC: 32.9 G/DL (ref 31–38)
MCV RBC AUTO: 103.4 FL (ref 80–96)
MONOCYTES # BLD AUTO: 0.5 X10*3/UL (ref 1.6–24.9)
MONOCYTES NFR BLD AUTO: 6.83 % (ref 1.7–9.3)
NEUTROPHILS # BLD AUTO: 5.22 X10*3/UL (ref 1.4–6.5)
NEUTROPHILS NFR BLD AUTO: 71.34 % (ref 42.2–75.2)
PLATELET # BLD AUTO: 201.8 X10*3/UL (ref 150–450)
PMV BLD AUTO: 9.59 FL (ref 7.8–11)
POTASSIUM SERPL-SCNC: 4.2 MMOL/L (ref 3.5–5.1)
RBC # BLD AUTO: 3.99 X10*6/UL (ref 3.9–5.3)
SODIUM SERPL-SCNC: 139 MMOL/L (ref 136–145)
TRIGL SERPL-MCNC: 85 MG/DL
VIT B12 SERPL-MCNC: 616 PG/ML (ref 193–986)
WBC # BLD AUTO: 7.32 X10*3/UL (ref 4.5–10.5)

## 2024-09-03 ENCOUNTER — TELEPHONE (OUTPATIENT)
Dept: PRIMARY CARE | Facility: CLINIC | Age: 59
End: 2024-09-03
Payer: COMMERCIAL

## 2024-09-03 NOTE — TELEPHONE ENCOUNTER
----- Message from Lise Jeff sent at 9/2/2024  4:42 PM EDT -----  Please call her with blood test results.  Overall her blood work is stable.  She is negative for autoimmune disorders.

## 2024-09-08 DIAGNOSIS — A60.04 HERPES SIMPLEX VULVOVAGINITIS: ICD-10-CM

## 2024-09-08 DIAGNOSIS — G43.709 CHRONIC MIGRAINE WITHOUT AURA WITHOUT STATUS MIGRAINOSUS, NOT INTRACTABLE: ICD-10-CM

## 2024-09-09 RX ORDER — AMITRIPTYLINE HYDROCHLORIDE 50 MG/1
50 TABLET, FILM COATED ORAL NIGHTLY
Qty: 90 TABLET | Refills: 0 | Status: SHIPPED | OUTPATIENT
Start: 2024-09-09

## 2024-09-09 RX ORDER — VALACYCLOVIR HYDROCHLORIDE 500 MG/1
500 TABLET, FILM COATED ORAL DAILY
Qty: 30 TABLET | Refills: 2 | Status: SHIPPED | OUTPATIENT
Start: 2024-09-09

## 2024-09-10 ENCOUNTER — APPOINTMENT (OUTPATIENT)
Dept: OCCUPATIONAL THERAPY | Facility: CLINIC | Age: 59
End: 2024-09-10
Payer: COMMERCIAL

## 2024-09-13 ENCOUNTER — APPOINTMENT (OUTPATIENT)
Dept: OPHTHALMOLOGY | Facility: CLINIC | Age: 59
End: 2024-09-13
Payer: COMMERCIAL

## 2024-09-13 DIAGNOSIS — Z96.1 PSEUDOPHAKIA, RIGHT EYE: ICD-10-CM

## 2024-09-13 DIAGNOSIS — H40.003 PREGLAUCOMA, UNSPECIFIED, BILATERAL: Primary | ICD-10-CM

## 2024-09-13 DIAGNOSIS — H25.812 COMBINED FORMS OF AGE-RELATED CATARACT OF LEFT EYE: ICD-10-CM

## 2024-09-13 DIAGNOSIS — H04.123 DRY EYES: ICD-10-CM

## 2024-09-13 PROCEDURE — 92133 CPTRZD OPH DX IMG PST SGM ON: CPT | Performed by: OPHTHALMOLOGY

## 2024-09-13 PROCEDURE — 99214 OFFICE O/P EST MOD 30 MIN: CPT | Performed by: OPHTHALMOLOGY

## 2024-09-13 ASSESSMENT — PATIENT HEALTH QUESTIONNAIRE - PHQ9
2. FEELING DOWN, DEPRESSED OR HOPELESS: NOT AT ALL
SUM OF ALL RESPONSES TO PHQ9 QUESTIONS 1 AND 2: 0
1. LITTLE INTEREST OR PLEASURE IN DOING THINGS: NOT AT ALL

## 2024-09-13 ASSESSMENT — PAIN SCALES - GENERAL: PAINLEVEL: 0-NO PAIN

## 2024-09-13 ASSESSMENT — REFRACTION_WEARINGRX
OD_SPHERE: +1.00
OD_CYLINDER: -1.00
SPECS_TYPE: PAL
OD_AXIS: 160
OS_ADD: +2.50
OS_SPHERE: -2.75
OD_ADD: +2.50

## 2024-09-13 ASSESSMENT — VISUAL ACUITY
CORRECTION_TYPE: GLASSES
OS_CC+: -2
OS_CC: 20/30
OS_BAT_HIGH: 20/25-1
OD_CC: 20/30
METHOD: SNELLEN - LINEAR
OD_CC+: -2

## 2024-09-13 ASSESSMENT — CUP TO DISC RATIO
OD_RATIO: 0.5
OS_RATIO: 0.4

## 2024-09-13 ASSESSMENT — EXTERNAL EXAM - LEFT EYE: OS_EXAM: NORMAL

## 2024-09-13 ASSESSMENT — ENCOUNTER SYMPTOMS
PSYCHIATRIC NEGATIVE: 0
ENDOCRINE NEGATIVE: 0
RESPIRATORY NEGATIVE: 0
NEUROLOGICAL NEGATIVE: 0
HEMATOLOGIC/LYMPHATIC NEGATIVE: 0
CARDIOVASCULAR NEGATIVE: 0
ALLERGIC/IMMUNOLOGIC NEGATIVE: 0
CONSTITUTIONAL NEGATIVE: 0
GASTROINTESTINAL NEGATIVE: 0
EYES NEGATIVE: 0
MUSCULOSKELETAL NEGATIVE: 0

## 2024-09-13 ASSESSMENT — PACHYMETRY
OD_CT(UM): 550
OS_CT(UM): 531

## 2024-09-13 ASSESSMENT — TONOMETRY
IOP_METHOD: GOLDMANN APPLANATION
OD_IOP_MMHG: 16
OS_IOP_MMHG: 16

## 2024-09-13 ASSESSMENT — EXTERNAL EXAM - RIGHT EYE: OD_EXAM: NORMAL

## 2024-09-13 NOTE — PROGRESS NOTES
"Subjective   Patient ID: Nereida Conway is a 59 y.o. female.    Chief Complaint    Follow-up       HPI    Pt states vision OS seems \"off\", would like to discuss cat surg OS    Dilated exam.  Will be having jaw surgery soon.  No other changes in health history.  Now on gabapentin.  Vision is good OD.  Complaining about OS.    Last edited by Angelito Ashby MD on 9/13/2024 12:02 PM.        No current outpatient medications on file. (Ophthalmology pharm classes)       Current Outpatient Medications (Other)   Medication Sig Dispense Refill    amitriptyline (Elavil) 50 mg tablet TAKE ONE TABLET BY MOUTH EVERY DAY AT BEDTIME 90 tablet 0    atorvastatin (Lipitor) 80 mg tablet take one tablet by mouth every day 90 tablet 0    citalopram (CeleXA) 40 mg tablet Take 1 tablet (40 mg) by mouth once daily. 90 tablet 1    gabapentin (Neurontin) 100 mg capsule Take 1 capsule (100 mg) by mouth once daily at bedtime. 30 capsule 2    ketoconazole (NIZOral) 2 % cream Apply topically 2 times a day. To affected areas of face 60 g 11    lisinopriL-hydrochlorothiazide 10-12.5 mg tablet Take 1 tablet by mouth once daily. 100 tablet 3    piroxicam (Feldene) 20 mg capsule Take 1 capsule (20 mg) by mouth early in the morning..      propranolol LA (Inderal LA) 120 mg 24 hr capsule TAKE ONE CAPSULE BY MOUTH ONCE DAILY 90 capsule 0    rifAXIMin (Xifaxan) 550 mg tablet Take 1 tablet (550 mg) by mouth 3 times a day. 42 tablet 2    rizatriptan (Maxalt) 10 mg tablet TAKE 1 TABLET BY MOUTH AT ONSET OF HEADACHE. MAY REPEAT EVERY 2 HOURS AS NEEDED. MAXIMUM OF 3 TABLETS IN 24 HOURS. 30 tablet 0    traMADol (Ultram) 50 mg tablet Take 1 tablet (50 mg) by mouth 3 times a day as needed for severe pain (7 - 10) for up to 200 doses. 50 tablet 3    tretinoin (Retin-A) 0.025 % cream Apply 1 Application topically once daily at bedtime. Start 1-2 nights per week 1-2 weeks, inc to every other night, then every night as tolerated 45 g 11    valACYclovir (Valtrex) " 500 mg tablet Take 1 tablet (500 mg) by mouth once daily. 30 tablet 2    Vitamin D3 50 mcg (2,000 unit) capsule TAKE 1 CAPSULE BY MOUTH ONCE A DAY 90 capsule 0       Objective   Base Eye Exam       Visual Acuity (Snellen - Linear)         Right Left    Dist cc 20/30 -2 20/30 -2      Correction: Glasses              Tonometry (Goldmann Applanation, 10:29 AM)         Right Left    Pressure 16 16              Pupils         Dark Shape React APD    Right 5 Round 2 None    Left 5 Round 2 None              Extraocular Movement         Right Left     Full Full              Dilation       Both eyes: 1% Tropic 2.5% Phen @ 10:30 AM                  Additional Tests       Glare Testing         High    Right     Left 20/25-1                  Slit Lamp and Fundus Exam       External Exam         Right Left    External Normal Normal              Slit Lamp Exam         Right Left    Lids/Lashes 1+ Blepharitis, 1+ Dermatochalasis - upper lid 1+ Blepharitis, 1+ Dermatochalasis - upper lid    Conjunctiva/Sclera White and quiet normal bulbar and palepbral conjunctiva    Cornea Clear normal epi/stroma/endo and tear film    Anterior Chamber Deep and quiet ant. chamber deep and quiet    Iris iris normal iris normal    Lens Centered posterior chamber intraocular lens 1+ Nuclear sclerosis, 1+ Cortical cataract    Anterior Vitreous vitreous clear and normal vitreous clear and normal              Fundus Exam         Right Left    Disc normal optic nerve normal optic nerve    C/D Ratio 0.5 0.4    Macula normal macula normal macula    Vessels normal retinal vessels normal retinal vessels    Periphery normal retinal periphery normal retinal periphery                  Refraction       Wearing Rx         Sphere Cylinder Axis Add    Right +1.00 -1.00 160 +2.50    Left -2.75   +2.50      Age: 6m    Type: PAL    Pupillary Distance: 60                    Assessment/Plan   Problem List Items Addressed This Visit          Eye/Vision problems     Preglaucoma, unspecified, bilateral - Primary     F/u one year full with oct nerves.           Relevant Orders    OCT, Optic Nerve - OU - Both Eyes (Completed)    Dry eyes    Pseudophakia, right eye    Combined forms of age-related cataract of left eye

## 2024-09-17 ENCOUNTER — APPOINTMENT (OUTPATIENT)
Dept: OCCUPATIONAL THERAPY | Facility: CLINIC | Age: 59
End: 2024-09-17
Payer: COMMERCIAL

## 2024-09-24 ENCOUNTER — APPOINTMENT (OUTPATIENT)
Dept: OCCUPATIONAL THERAPY | Facility: CLINIC | Age: 59
End: 2024-09-24
Payer: COMMERCIAL

## 2024-09-28 DIAGNOSIS — G43.009 MIGRAINE WITHOUT AURA AND WITHOUT STATUS MIGRAINOSUS, NOT INTRACTABLE: ICD-10-CM

## 2024-09-30 DIAGNOSIS — M19.049 CMC ARTHRITIS: ICD-10-CM

## 2024-09-30 RX ORDER — TOPIRAMATE 25 MG/1
TABLET ORAL
Qty: 90 TABLET | Refills: 0 | OUTPATIENT
Start: 2024-09-30

## 2024-09-30 RX ORDER — TRAMADOL HYDROCHLORIDE 50 MG/1
50 TABLET ORAL 3 TIMES DAILY PRN
Qty: 50 TABLET | Refills: 3 | Status: SHIPPED | OUTPATIENT
Start: 2024-09-30

## 2024-10-01 NOTE — TELEPHONE ENCOUNTER
Pt called and said her pharmacy, [facility] in [place], told her her script for Topiramate 25 mg oral tablet was denied (?).  She has not had it for 4-5 days and is concerned.      Tel# [phone]    10/2 - Pt just called again; has been 6 days without topiramate.

## 2024-10-02 DIAGNOSIS — G43.009 MIGRAINE WITHOUT AURA AND WITHOUT STATUS MIGRAINOSUS, NOT INTRACTABLE: Primary | ICD-10-CM

## 2024-10-02 RX ORDER — TOPIRAMATE 25 MG/1
25 TABLET ORAL 3 TIMES DAILY
COMMUNITY
End: 2024-10-02 | Stop reason: SDUPTHER

## 2024-10-02 RX ORDER — TOPIRAMATE 25 MG/1
TABLET ORAL
Qty: 90 TABLET | Refills: 0 | Status: SHIPPED | OUTPATIENT
Start: 2024-10-02

## 2024-10-15 ENCOUNTER — TREATMENT (OUTPATIENT)
Dept: OCCUPATIONAL THERAPY | Facility: CLINIC | Age: 59
End: 2024-10-15
Payer: COMMERCIAL

## 2024-10-15 DIAGNOSIS — M19.049 ARTHRITIS OF CARPOMETACARPAL JOINT: ICD-10-CM

## 2024-10-15 PROCEDURE — 97112 NEUROMUSCULAR REEDUCATION: CPT | Mod: GO | Performed by: OCCUPATIONAL THERAPIST

## 2024-10-15 PROCEDURE — 97110 THERAPEUTIC EXERCISES: CPT | Mod: GO | Performed by: OCCUPATIONAL THERAPIST

## 2024-10-15 NOTE — PROGRESS NOTES
"    Occupational Therapy Orthopedic Treatment and Discharge    Patient Name: Nereida Conway  MRN: [id number]  Today's Date: 10/15/2024  Time Calculation  Start Time: 0930  Stop Time: 1012  Time Calculation (min): 42 min    Insurance:  Visit number: 2 of 29  (1 visits used earlier this year)  Authorization info: none required  Insurance Type:  [place]      Current Problem  1. Arthritis of carpometacarpal joint  Follow Up In Occupational Therapy          Referred by: Danna Esparza PA-C  Referred for: R CMC arthritis/ weakness  DOS: 9/7/23    Fall risk:  none  Precautions:  none     Medical History Form: Reviewed (scanned into chart)    Subjective   \"I have been having to deal with a lot of things with my parents aging recently so I have not been able to do any exercises for quite some time.  Overall my thumb is doing well, the surgery was very well worth it\"    Hand Dominance: Right    Pain:  2/10  Location: R volar/dorsal thumb MP  Description: aching, dull, weak  Aggravating Factors:   inactivity, stress, weather, cold  Relieving Factors:  heat    Previous Interventions/Treatments:   Occupational Therapy earlier this year    Prior Level of Function (PLOF)  Patient previously independent with all ADLs/IADLs    ADL/IADL impairments  Home mgt, Meal prep, and Work    Patients Living Environment: Reviewed and no concern  Lives with: Boyfriend  Primary Language: English  Patient's Goal(s) for Therapy:  \"Improve my strength and reduce my pain\"    Red Flags: Do you have any of the following? No  Fever/chills, unexplained weight changes, dizziness/fainting, unexplained change in bowel or bladder functions, unexplained malaise or muscle weakness, night pain/sweats, numbness or tingling    Objective   HAND STRENGTH (Lbs)   R L   Dynamometer  65 70   Lateral Pinch 14 16   3jaw Pinch 13 14     R thumb opposition AROM:   SF P1      Outcome Measures: Today I really like to have someone you want patient to [employer] but it was " just         QUICK DASH:  13.64%     Home Program:  Exercise Sets/Reps Comments   Ylw putty lateral and 3pt pinch     Activity modification As needed    Thumb spica for work As needed    Massage 2-3x/day 5-10minutes                                   Treatment Today  Neuro re-ed:  - ASL AROM within fluidotherapy for desensitization and re-ed of digital proprioception x 10min      Therapeutic ex:  - finger ABD small rubber band to increase 1st extensor compartment strength 5x10  - lateral and 3pt pinch to increase pinch strength x 5 min  - finger ADD green putty to increase intrinsic  strength x 10 each web space  - large knob puttycise to increase wrist and thumb strength x 5 min  - reviewed HEP to ensure long term carryover     EDUCATION: Home exercise program, plan of care, activity modifications, joint protection, pain management, and injury pathology       Assessment:   Pt demonstrates improved R  strength by 10lb since evaluation.  She has met all of her goals and has excellent understanding of her long term HEP to continue progressing independently.      Level of Complexity  LOW complexity    OT Rehab Potential  Excellent      Plan:     Planned Interventions include: therapeutic exercise, self-care home management, manual therapy, therapeutic activities, , neuromuscular coordination, vasopneumatic, dry needling, and orthosis fabrication.  Frequency: 1 x Week  Duration: 6 Weeks    Goals: Set and discussed today         1) Patient will demo at least 5lb improved R  strength to open jars for meal prep, in 6 weeks    GOAL MET         2) Patient will demo 2lb improved lateral pinch strength to manipulates nail clippers, in 6 weeks.    GOAL MET         3) Patient will be independent of conservative mgt of diagnosis with work tasks, in 4 weeks    GOAL MET      Plan of care was developed with input and agreement by the patient.      Jovanna Epstein, OT

## 2024-10-22 ENCOUNTER — APPOINTMENT (OUTPATIENT)
Dept: OCCUPATIONAL THERAPY | Facility: CLINIC | Age: 59
End: 2024-10-22
Payer: COMMERCIAL

## 2024-10-24 DIAGNOSIS — I10 PRIMARY HYPERTENSION: ICD-10-CM

## 2024-10-25 RX ORDER — PROPRANOLOL HYDROCHLORIDE 120 MG/1
120 CAPSULE, EXTENDED RELEASE ORAL DAILY
Qty: 90 CAPSULE | Refills: 0 | Status: SHIPPED | OUTPATIENT
Start: 2024-10-25

## 2024-10-26 DIAGNOSIS — G60.9 IDIOPATHIC PERIPHERAL NEUROPATHY: ICD-10-CM

## 2024-10-28 RX ORDER — GABAPENTIN 100 MG/1
100 CAPSULE ORAL NIGHTLY
Qty: 30 CAPSULE | Refills: 0 | Status: SHIPPED | OUTPATIENT
Start: 2024-10-28

## 2024-10-29 ENCOUNTER — APPOINTMENT (OUTPATIENT)
Dept: OCCUPATIONAL THERAPY | Facility: CLINIC | Age: 59
End: 2024-10-29
Payer: COMMERCIAL

## 2024-10-29 DIAGNOSIS — G43.009 MIGRAINE WITHOUT AURA AND WITHOUT STATUS MIGRAINOSUS, NOT INTRACTABLE: ICD-10-CM

## 2024-10-29 DIAGNOSIS — E78.00 PURE HYPERCHOLESTEROLEMIA: ICD-10-CM

## 2024-10-29 RX ORDER — TOPIRAMATE 25 MG/1
TABLET ORAL
Qty: 90 TABLET | Refills: 5 | Status: SHIPPED | OUTPATIENT
Start: 2024-10-29

## 2024-10-29 RX ORDER — ATORVASTATIN CALCIUM 80 MG/1
80 TABLET, FILM COATED ORAL DAILY
Qty: 90 TABLET | Refills: 0 | Status: SHIPPED | OUTPATIENT
Start: 2024-10-29

## 2024-11-03 DIAGNOSIS — G43.701 CHRONIC MIGRAINE WITHOUT AURA WITH STATUS MIGRAINOSUS, NOT INTRACTABLE: ICD-10-CM

## 2024-11-04 RX ORDER — RIZATRIPTAN BENZOATE 10 MG/1
TABLET ORAL
Qty: 30 TABLET | Refills: 0 | Status: SHIPPED | OUTPATIENT
Start: 2024-11-04

## 2024-11-21 DIAGNOSIS — G60.9 IDIOPATHIC PERIPHERAL NEUROPATHY: ICD-10-CM

## 2024-11-21 RX ORDER — GABAPENTIN 100 MG/1
100 CAPSULE ORAL NIGHTLY
Qty: 30 CAPSULE | Refills: 0 | Status: SHIPPED | OUTPATIENT
Start: 2024-11-21

## 2024-11-22 DIAGNOSIS — E55.9 MILD VITAMIN D DEFICIENCY: ICD-10-CM

## 2024-11-25 RX ORDER — NICOTINE 11MG/24HR
PATCH, TRANSDERMAL 24 HOURS TRANSDERMAL DAILY
Qty: 90 CAPSULE | Refills: 0 | Status: SHIPPED | OUTPATIENT
Start: 2024-11-25

## 2024-12-05 NOTE — PROGRESS NOTES
[facility]  Hand and Upper Extremity Service  Follow up visit         Follow up for: Left thumb    Interval History: She returns for her left thumb. She received a left thumb CMC injection at last visit and has only given her temporary relief. 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 She's in the process of moving them to a skilled nursing facility. She has requested repeat injections and tramadol refill. She's very pleased with results from her right thumb CMC arthroplasty.               Past medical history, medications, allergies, surgical history and review of systems are reviewed and otherwise unchanged when compared to last visit on 5/6/24         Examination:  Constitutional: Oriented to person, place, and time.  Appears well-developed and well-nourished.  Head: Normocephalic and atraumatic.  Eyes: Pupils are equal, round, and reactive to light.  Cardiovascular: Intact distal pulses.  Pulmonary/Chest/Breast: Effort normal. No respiratory distress.  Neurological: Alert and oriented to person, place, and time.  Skin: Skin is warm and dry.  Psychiatric: normal mood and affect.  Behavior is normal.  Musculoskeletal: Left hand reveals bony prominence at thumb CMC joint. Positive thumb CMC grind test without MP joint instability and mild crepitus.        Impression: Left thumb CMC joint arthritis       Plan: We gave her a left thumb CMC injection and I've refilled her tramadol prescription. Hopefully after she gets her parents to their skilled nursing facility, we can get her scheduled for a left thumb CMC joint arthroplasty.       In Office Procedures Performed: Left thumb CMC injection   S Inj/Asp: L thumb CMC on 12/9/2024 12:31 PM  Indications: pain  Details: 25 G needle, dorsal approach  Medications: 20 mg triamcinolone acetonide 40 mg/mL; 0.5 mL lidocaine 10 mg/mL (1 %)  Outcome: tolerated well, no immediate  complications  Procedure, treatment alternatives, risks and benefits explained, specific risks discussed. Consent was given by the patient. Immediately prior to procedure a time out was called to verify the correct patient, procedure, equipment, support staff and site/side marked as required. Patient was prepped and draped in the usual sterile fashion.             Medications Prescribed: tramadol 50 mg       Follow up: As needed              Jurgen Munguia MD  [facility]  Department of Orthopaedic Surgery  Hand and Upper Extremity Reconstruction    Scribe Attestation  By signing my name below, I, Cassi Huerta , Scribe   attest that this documentation has been prepared under the direction and in the presence of Dr. Jurgen Munguia.    Dictation performed with the use of voice recognition software.  Syntax and grammatical errors may exist.

## 2024-12-06 ENCOUNTER — OFFICE VISIT (OUTPATIENT)
Dept: PRIMARY CARE | Facility: CLINIC | Age: 59
End: 2024-12-06
Payer: COMMERCIAL

## 2024-12-06 VITALS
DIASTOLIC BLOOD PRESSURE: 64 MMHG | WEIGHT: 132 LBS | SYSTOLIC BLOOD PRESSURE: 100 MMHG | BODY MASS INDEX: 25.36 KG/M2 | TEMPERATURE: 98.2 F

## 2024-12-06 DIAGNOSIS — I10 PRIMARY HYPERTENSION: ICD-10-CM

## 2024-12-06 DIAGNOSIS — H93.12 TINNITUS OF LEFT EAR: Primary | ICD-10-CM

## 2024-12-06 DIAGNOSIS — H61.23 BILATERAL IMPACTED CERUMEN: ICD-10-CM

## 2024-12-06 ASSESSMENT — ENCOUNTER SYMPTOMS
ARTHRALGIAS: 1
DIARRHEA: 0
WEAKNESS: 0
DIZZINESS: 0
OCCASIONAL FEELINGS OF UNSTEADINESS: 0
WHEEZING: 0
SORE THROAT: 0
ANAL BLEEDING: 0
FACIAL SWELLING: 0
ABDOMINAL DISTENTION: 0
FEVER: 0
CONSTIPATION: 0
COLOR CHANGE: 0
CHEST TIGHTNESS: 0
APPETITE CHANGE: 0
LOSS OF SENSATION IN FEET: 0
CHOKING: 0
TREMORS: 0
CONFUSION: 0
NAUSEA: 0
FATIGUE: 0
EYE PAIN: 0
HEMATURIA: 0
HEADACHES: 0
POLYDIPSIA: 0
POLYPHAGIA: 0
JOINT SWELLING: 0
FREQUENCY: 0
PALPITATIONS: 0
MYALGIAS: 0
SHORTNESS OF BREATH: 0
COUGH: 0
ABDOMINAL PAIN: 0
CHILLS: 0
SLEEP DISTURBANCE: 0
DEPRESSION: 0
VOMITING: 0
NUMBNESS: 0
DIFFICULTY URINATING: 0
EYE DISCHARGE: 0
NERVOUS/ANXIOUS: 0

## 2024-12-06 ASSESSMENT — PAIN SCALES - GENERAL: PAINLEVEL_OUTOF10: 6

## 2024-12-06 NOTE — PROGRESS NOTES
Subjective   Patient ID: Nereida Conway is a 59 y.o. 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    HPI the patient is presented with complaints of ringing and pressure of her left ear which she developed this morning.  She denies any pain.  Stated that she needs left TMJ replacement which was recommended by Dr. Goodrich.  Her blood pressure is well-controlled.  She denies chest pains, shortness of breath or leg edema.  Patient ID: Nereida Conway is a 59 y.o. female.    Ear Cerumen Removal    Date/Time: 12/6/2024 2:38 PM    Performed by: Lise Jeff PA-C  Authorized by: Lise Jeff PA-C    Consent:     Consent obtained:  Verbal    Consent given by:  Patient    Risks, benefits, and alternatives were discussed: yes      Risks discussed:  Bleeding, infection, pain, TM perforation, incomplete removal and dizziness    Alternatives discussed:  Alternative treatment  Universal protocol:     Procedure explained and questions answered to patient or proxy's satisfaction: yes      Patient identity confirmed:  Verbally with patient  Procedure details:     Location:  L ear and R ear    Procedure type: irrigation      Procedure outcomes: cerumen removed    Post-procedure details:     Inspection:  TM intact    Hearing quality:  Improved    Procedure completion:  Tolerated      Review of Systems   Constitutional:  Negative for appetite change, chills, fatigue and fever.   HENT:  Positive for tinnitus. Negative for congestion, ear pain, facial swelling, hearing loss, nosebleeds and sore throat.         Pressure of the left ear   Eyes:  Negative for pain, discharge and visual disturbance.   Respiratory:  Negative for cough, choking, chest tightness, shortness of breath and wheezing.    Cardiovascular:  Negative for chest pain, palpitations and leg  swelling.   Gastrointestinal:  Negative for abdominal distention, abdominal pain, anal bleeding, constipation, diarrhea, nausea and vomiting.   Endocrine: Negative for cold intolerance, heat intolerance, polydipsia, polyphagia and polyuria.   Genitourinary:  Negative for difficulty urinating, frequency, hematuria and urgency.   Musculoskeletal:  Positive for arthralgias. Negative for gait problem, joint swelling and myalgias.   Skin:  Negative for color change and rash.   Neurological:  Negative for dizziness, tremors, syncope, weakness, numbness and headaches.   Psychiatric/Behavioral:  Negative for behavioral problems, confusion, sleep disturbance and suicidal ideas. The patient is not nervous/anxious.        Objective   /64   Temp 36.8 °C (98.2 °F) (Temporal)   Wt 59.9 kg (132 lb)   LMP  (LMP Unknown)   BMI 25.36 kg/m²     Physical Exam  Constitutional:       General: She is not in acute distress.     Appearance: Normal appearance.   HENT:      Head: Normocephalic and atraumatic.      Ears:      Comments: Cerumen impaction bilaterally     Nose: Nose normal.   Eyes:      Extraocular Movements: Extraocular movements intact.      Conjunctiva/sclera: Conjunctivae normal.      Pupils: Pupils are equal, round, and reactive to light.   Cardiovascular:      Rate and Rhythm: Normal rate and regular rhythm.      Pulses: Normal pulses.      Heart sounds: Normal heart sounds.   Pulmonary:      Effort: Pulmonary effort is normal.      Breath sounds: Normal breath sounds.   Abdominal:      General: Bowel sounds are normal.      Palpations: Abdomen is soft.   Musculoskeletal:         General: Normal range of motion.      Cervical back: Normal range of motion and neck supple.   Neurological:      General: No focal deficit present.      Mental Status: She is alert and oriented to person, place, and time.   Psychiatric:         Mood and Affect: Mood normal.         Behavior: Behavior normal.         Thought Content:  Thought content normal.         Judgment: Judgment normal.         Assessment/Plan   Left ear tinnitus  Most likely due to cerumen impaction and TMJ  Removed with curette and lavage  Tolerated well  Do not use Q-tips    Left TMJ  Recommended TMJ replacement  Follow up with oral surgery Dr. Delgado     HTN  Well-controlled  Continue lisinopril-HCTZ 10-12.5 mg daily  If low, will discontinue hydrochlorothiazide and continue lisinopril  No added salt diet, do not add salt to your food  Try to exercise every other day for 30 minutes    Arthralgia, osteoarthritis of multiple joints   Low back pain, left hip pain, right TMJ, right and left thumb CMC joint pains  s/p hip replacement   S/p right CMC joint arthroplasty   Right TMJ surgery will be scheduled  Failed Neurontin, tramadol, Cymbalta, and indomethacin  Currently on tramadol 50 mg 3 times daily given by Dr. Munguia  Recommended OT  Follow-up with orthopedic surgery Dr. Munguia    IBS with diarrhea  Stable  Xifaxan as needed  Take probiotics daily  Colonoscopy February 2024, repeat in 7 years  Follow-up with gastroenterology     Peripheral neuropathy  Started on Gabapentin 100 mg hs   Follow up with neurology [facility]ick     leg spasms   Improved  Off of magnesium 400 mg  Drink plenty of fluids     Former smoker  quit in 2015  4 mm pulmonary nodule right lung  Repeat CT chest 3/18/2023    Dyslipidemia  Continue Atorvastatin 80 mg at bed time  Continue with the low fat, low cholesterol diet  I recommend Mediterranean diet, which include fish, chicken, vegetables and olive oil  Exercise daily for 30 minutes at least 3 times a week     Migraines  Stable  Continue Amitriptyline 50 mg at bed time   Continue Propranolol 120 mg once daily  Maxalt as needed  Follow-up with neurology     Depression and anxiety  stable  Continue Amitriptyline 50 mg t bed time  Take Citalopram 40 mg once daily     Herpes simplex 2   Take Valacyclovir as needed for flare ups.     Abnormal mammogram  7/5/2024  Biopsy benign    Vitamin D deficiency  Continue vitamin D 2000 units daily    Body mass index is 25.36 kg/m².  Patient has lost 13 pounds    Health maintenance  Mammogram 7/5/2024  Colonoscopy 2024, repeat in 7 years    Follow-up in 3 months

## 2024-12-09 ENCOUNTER — APPOINTMENT (OUTPATIENT)
Dept: ORTHOPEDIC SURGERY | Facility: CLINIC | Age: 59
End: 2024-12-09
Payer: COMMERCIAL

## 2024-12-09 VITALS — HEIGHT: 61 IN | BODY MASS INDEX: 24.92 KG/M2 | WEIGHT: 132 LBS

## 2024-12-09 DIAGNOSIS — M19.049 CMC ARTHRITIS: ICD-10-CM

## 2024-12-09 PROCEDURE — 20600 DRAIN/INJ JOINT/BURSA W/O US: CPT | Performed by: ORTHOPAEDIC SURGERY

## 2024-12-09 PROCEDURE — 3008F BODY MASS INDEX DOCD: CPT | Performed by: ORTHOPAEDIC SURGERY

## 2024-12-09 PROCEDURE — 1036F TOBACCO NON-USER: CPT | Performed by: ORTHOPAEDIC SURGERY

## 2024-12-09 PROCEDURE — 99213 OFFICE O/P EST LOW 20 MIN: CPT | Performed by: ORTHOPAEDIC SURGERY

## 2024-12-09 RX ORDER — TRIAMCINOLONE ACETONIDE 40 MG/ML
20 INJECTION, SUSPENSION INTRA-ARTICULAR; INTRAMUSCULAR
Status: COMPLETED | OUTPATIENT
Start: 2024-12-09 | End: 2024-12-09

## 2024-12-09 RX ORDER — LIDOCAINE HYDROCHLORIDE 10 MG/ML
0.5 INJECTION, SOLUTION INFILTRATION; PERINEURAL
Status: COMPLETED | OUTPATIENT
Start: 2024-12-09 | End: 2024-12-09

## 2024-12-09 RX ORDER — TRAMADOL HYDROCHLORIDE 50 MG/1
50 TABLET ORAL 3 TIMES DAILY PRN
Qty: 50 TABLET | Refills: 3 | Status: SHIPPED | OUTPATIENT
Start: 2024-12-09

## 2024-12-09 ASSESSMENT — PAIN DESCRIPTION - DESCRIPTORS: DESCRIPTORS: SORE;ACHING

## 2024-12-09 ASSESSMENT — PAIN SCALES - GENERAL: PAINLEVEL_OUTOF10: 5 - MODERATE PAIN

## 2024-12-09 ASSESSMENT — PAIN - FUNCTIONAL ASSESSMENT: PAIN_FUNCTIONAL_ASSESSMENT: 0-10

## 2024-12-14 DIAGNOSIS — G43.709 CHRONIC MIGRAINE WITHOUT AURA WITHOUT STATUS MIGRAINOSUS, NOT INTRACTABLE: ICD-10-CM

## 2024-12-14 DIAGNOSIS — A60.04 HERPES SIMPLEX VULVOVAGINITIS: ICD-10-CM

## 2024-12-16 RX ORDER — AMITRIPTYLINE HYDROCHLORIDE 50 MG/1
50 TABLET, FILM COATED ORAL NIGHTLY
Qty: 90 TABLET | Refills: 0 | Status: SHIPPED | OUTPATIENT
Start: 2024-12-16

## 2024-12-16 RX ORDER — VALACYCLOVIR HYDROCHLORIDE 500 MG/1
500 TABLET, FILM COATED ORAL DAILY
Qty: 30 TABLET | Refills: 0 | Status: SHIPPED | OUTPATIENT
Start: 2024-12-16

## 2024-12-23 ENCOUNTER — HOSPITAL ENCOUNTER (OUTPATIENT)
Dept: RADIOLOGY | Facility: EXTERNAL LOCATION | Age: 59
Discharge: HOME | End: 2024-12-23
Payer: COMMERCIAL

## 2024-12-30 DIAGNOSIS — G60.9 IDIOPATHIC PERIPHERAL NEUROPATHY: ICD-10-CM

## 2024-12-30 RX ORDER — GABAPENTIN 100 MG/1
100 CAPSULE ORAL NIGHTLY
Qty: 30 CAPSULE | Refills: 0 | Status: SHIPPED | OUTPATIENT
Start: 2024-12-30

## 2024-12-31 ENCOUNTER — TELEMEDICINE (OUTPATIENT)
Dept: PRIMARY CARE | Facility: CLINIC | Age: 59
End: 2024-12-31
Payer: COMMERCIAL

## 2024-12-31 DIAGNOSIS — J01.00 ACUTE NON-RECURRENT MAXILLARY SINUSITIS: Primary | ICD-10-CM

## 2024-12-31 PROCEDURE — 1036F TOBACCO NON-USER: CPT | Performed by: PHYSICIAN ASSISTANT

## 2024-12-31 PROCEDURE — 99212 OFFICE O/P EST SF 10 MIN: CPT | Performed by: PHYSICIAN ASSISTANT

## 2024-12-31 RX ORDER — OXYCODONE AND ACETAMINOPHEN 5; 325 MG/1; MG/1
1 TABLET ORAL EVERY 4 HOURS PRN
COMMUNITY
Start: 2024-12-17

## 2024-12-31 RX ORDER — CYCLOBENZAPRINE HCL 10 MG
10 TABLET ORAL NIGHTLY
COMMUNITY
Start: 2024-11-26

## 2024-12-31 RX ORDER — AZITHROMYCIN 250 MG/1
TABLET, FILM COATED ORAL
Qty: 6 TABLET | Refills: 0 | Status: SHIPPED | OUTPATIENT
Start: 2024-12-31 | End: 2025-01-05

## 2024-12-31 ASSESSMENT — ENCOUNTER SYMPTOMS
JOINT SWELLING: 0
CHOKING: 0
HEADACHES: 0
SLEEP DISTURBANCE: 0
DIARRHEA: 0
MYALGIAS: 0
EYE DISCHARGE: 0
FEVER: 0
SINUS PRESSURE: 1
CONSTIPATION: 0
FREQUENCY: 0
ARTHRALGIAS: 0
CONFUSION: 0
PALPITATIONS: 0
NUMBNESS: 0
TREMORS: 0
SINUS PAIN: 1
WEAKNESS: 0
CHILLS: 0
SORE THROAT: 0
NERVOUS/ANXIOUS: 0
POLYPHAGIA: 0
ABDOMINAL DISTENTION: 0
VOMITING: 0
COUGH: 0
POLYDIPSIA: 0
HEMATURIA: 0
FACIAL SWELLING: 0
EYE PAIN: 0
ANAL BLEEDING: 0
DIFFICULTY URINATING: 0
NAUSEA: 0
DIZZINESS: 0
COLOR CHANGE: 0
ABDOMINAL PAIN: 0
APPETITE CHANGE: 0
SHORTNESS OF BREATH: 0
WHEEZING: 0
CHEST TIGHTNESS: 0
FATIGUE: 0

## 2024-12-31 NOTE — PROGRESS NOTES
Subjective   Patient ID: Nereida Conway is a 59 y.o. 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    Virtual or Telephone Consent    An interactive audio and video telecommunication system which permits real time communications between the patient (at the originating site) and provider (at the distant site) was utilized to provide this telehealth service.   Verbal consent was requested and obtained from Nereida Conway on this date, 12/31/24 for a telehealth visit.     HPI the patient is complaining of sinus pressure, tenderness, maxillary pain, jaw pain, green nasal discharge which she developed yesterday. She denies cough, sore throat, earaches, SOB, wheezing, fever, or chills.     Review of Systems   Constitutional:  Negative for appetite change, chills, fatigue and fever.   HENT:  Positive for congestion, sinus pressure and sinus pain. Negative for ear pain, facial swelling, hearing loss, nosebleeds, sore throat and tinnitus.    Eyes:  Negative for pain, discharge and visual disturbance.   Respiratory:  Negative for cough, choking, chest tightness, shortness of breath and wheezing.    Cardiovascular:  Negative for chest pain, palpitations and leg swelling.   Gastrointestinal:  Negative for abdominal distention, abdominal pain, anal bleeding, constipation, diarrhea, nausea and vomiting.   Endocrine: Negative for cold intolerance, heat intolerance, polydipsia, polyphagia and polyuria.   Genitourinary:  Negative for difficulty urinating, frequency, hematuria and urgency.   Musculoskeletal:  Negative for arthralgias, gait problem, joint swelling and myalgias.   Skin:  Negative for color change and rash.   Neurological:  Negative for dizziness, tremors, syncope, weakness, numbness and headaches.   Psychiatric/Behavioral:  Negative for  behavioral problems, confusion, sleep disturbance and suicidal ideas. The patient is not nervous/anxious.        Objective   LMP  (LMP Unknown)     Physical Exam    Assessment/Plan   Acute sinusitis  Start Z-Dusty as directed  Take Mucinex D    Left TMJ  Recommended TMJ replacement  Follow up with oral surgery Dr. Delgado     HTN  Well-controlled  Continue lisinopril-HCTZ 10-12.5 mg daily  If low, will discontinue hydrochlorothiazide and continue lisinopril  No added salt diet, do not add salt to your food  Try to exercise every other day for 30 minutes    Arthralgia, osteoarthritis of multiple joints   Low back pain, left hip pain, right TMJ, right and left thumb CMC joint pains  s/p hip replacement   S/p right CMC joint arthroplasty   Right TMJ surgery will be scheduled  Failed Neurontin, tramadol, Cymbalta, and indomethacin  Currently on tramadol 50 mg 3 times daily given by Dr. Munguia  Recommended OT  Follow-up with orthopedic surgery Dr. Munguia    IBS with diarrhea  Stable  Xifaxan as needed  Take probiotics daily  Colonoscopy February 2024, repeat in 7 years  Follow-up with gastroenterology     Peripheral neuropathy  Started on Gabapentin 100 mg hs   Follow up with neurology [facility]     leg spasms   Improved  Off of magnesium 400 mg  Drink plenty of fluids     Former smoker  quit in 2015  4 mm pulmonary nodule right lung  Repeat CT chest 3/18/2023    Dyslipidemia  Continue Atorvastatin 80 mg at bed time  Continue with the low fat, low cholesterol diet  I recommend Mediterranean diet, which include fish, chicken, vegetables and olive oil  Exercise daily for 30 minutes at least 3 times a week     Migraines  Stable  Continue Amitriptyline 50 mg at bed time   Continue Propranolol 120 mg once daily  Maxalt as needed  Follow-up with neurology     Depression and anxiety  stable  Continue Amitriptyline 50 mg t bed time  Take Citalopram 40 mg once daily     Herpes simplex 2   Take Valacyclovir as needed for flare ups.      Abnormal mammogram 7/5/2024  Biopsy benign    Vitamin D deficiency  Continue vitamin D 2000 units daily    There is no height or weight on file to calculate BMI.  Patient has lost 13 pounds    Health maintenance  Mammogram 7/5/2024  Colonoscopy 2024, repeat in 7 years    Follow-up in 3 months

## 2025-01-11 DIAGNOSIS — A60.04 HERPES SIMPLEX VULVOVAGINITIS: ICD-10-CM

## 2025-01-13 RX ORDER — VALACYCLOVIR HYDROCHLORIDE 500 MG/1
500 TABLET, FILM COATED ORAL DAILY
Qty: 30 TABLET | Refills: 5 | Status: SHIPPED | OUTPATIENT
Start: 2025-01-13

## 2025-01-14 DIAGNOSIS — M19.09 DEGENERATIVE ARTHRITIS OF TEMPOROMANDIBULAR JOINT: ICD-10-CM

## 2025-01-14 DIAGNOSIS — M26.622 ARTHRALGIA OF LEFT TEMPOROMANDIBULAR JOINT: Primary | ICD-10-CM

## 2025-01-15 ENCOUNTER — APPOINTMENT (OUTPATIENT)
Dept: RADIOLOGY | Facility: CLINIC | Age: 60
End: 2025-01-15
Payer: COMMERCIAL

## 2025-01-15 ENCOUNTER — APPOINTMENT (OUTPATIENT)
Dept: SURGICAL ONCOLOGY | Facility: CLINIC | Age: 60
End: 2025-01-15
Payer: COMMERCIAL

## 2025-01-21 DIAGNOSIS — I10 PRIMARY HYPERTENSION: ICD-10-CM

## 2025-01-21 DIAGNOSIS — E78.00 PURE HYPERCHOLESTEROLEMIA: ICD-10-CM

## 2025-01-22 RX ORDER — ATORVASTATIN CALCIUM 80 MG/1
80 TABLET, FILM COATED ORAL DAILY
Qty: 90 TABLET | Refills: 0 | Status: SHIPPED | OUTPATIENT
Start: 2025-01-22

## 2025-01-22 RX ORDER — PROPRANOLOL HYDROCHLORIDE 120 MG/1
120 CAPSULE, EXTENDED RELEASE ORAL DAILY
Qty: 90 CAPSULE | Refills: 0 | Status: SHIPPED | OUTPATIENT
Start: 2025-01-22

## 2025-02-06 DIAGNOSIS — F32.5 MAJOR DEPRESSIVE DISORDER WITH SINGLE EPISODE, IN FULL REMISSION (CMS-HCC): ICD-10-CM

## 2025-02-06 DIAGNOSIS — G60.9 IDIOPATHIC PERIPHERAL NEUROPATHY: ICD-10-CM

## 2025-02-06 RX ORDER — CITALOPRAM 40 MG/1
40 TABLET, FILM COATED ORAL DAILY
Qty: 90 TABLET | Refills: 0 | Status: SHIPPED | OUTPATIENT
Start: 2025-02-06

## 2025-02-07 RX ORDER — GABAPENTIN 100 MG/1
100 CAPSULE ORAL NIGHTLY
Qty: 30 CAPSULE | Refills: 0 | Status: SHIPPED | OUTPATIENT
Start: 2025-02-07

## 2025-02-13 DIAGNOSIS — G43.701 CHRONIC MIGRAINE WITHOUT AURA WITH STATUS MIGRAINOSUS, NOT INTRACTABLE: ICD-10-CM

## 2025-02-13 DIAGNOSIS — M19.049 CMC ARTHRITIS: ICD-10-CM

## 2025-02-13 RX ORDER — TRAMADOL HYDROCHLORIDE 50 MG/1
50 TABLET ORAL 3 TIMES DAILY PRN
Qty: 50 TABLET | Refills: 3 | Status: SHIPPED | OUTPATIENT
Start: 2025-02-13

## 2025-02-13 RX ORDER — RIZATRIPTAN BENZOATE 10 MG/1
10 TABLET ORAL ONCE AS NEEDED
Qty: 15 TABLET | Refills: 3 | Status: SHIPPED | OUTPATIENT
Start: 2025-02-13

## 2025-02-18 NOTE — PROGRESS NOTES
[facility]  Nereida Conway female   1965 59 y.o.  [id number]      Chief Complaint  High risk breast cancer evaluation and short term follow up imaging    History Of Present Illness  Nereida Conway is a 59 y.o.  female seen in the breast center for high risk breast cancer evaluation. 2024 right breast benign core biopsy. She denies breast surgery. She has family history of breast cancer in both sisters who had negative genetic testing.    BREAST IMAGIN2024 Bilateral screening mammogram, BI-RADS Category 0, Right breast microcalcifications. 2024 RIGHT diagnostic mammogram, BI-RADS Category 4, 2 discrete groupings round calcifications in the central right breast at middle depth, increased from multiple priors. Stereotactic guided biopsy of 1 of these 2 groupings is recommended for further evaluation. An additional area of grouped coarse and round calcifications is seen at anterior depth which are probably benign. Recommend six-month mammographic follow-up if biopsy results are benign. 2025 RIGHT diagnostic mammogram BI-RADS Category 3.         REPRODUCTIVE HISTORY: menarche age 12, , first birth age 29,  8 months, no OCP's, menopause age unknown - s/p partial hysterectomy age 48, heterogeneously dense     FAMILY CANCER HISTORY:   Sister: Breast cancer, age 44  Sister (2): Breast cancer, age 49  Mother: Brain cancer, alive  Father: Prostate cancer  Paternal aunt: Colon cancer  Paternal uncle: Colon cancer    Review of Systems  Constitutional:  Negative for appetite change, fatigue, fever and unexpected weight change.   HENT:  Negative for ear pain, hearing loss, nosebleeds, sore throat and trouble swallowing.    Eyes:  Negative for discharge, itching and visual disturbance.   Breast: As stated in HPI.  Respiratory:  Negative for cough, chest tightness and shortness of breath.    Cardiovascular:  Negative for chest pain, palpitations and leg swelling.    Gastrointestinal:  Negative for abdominal pain, constipation, diarrhea and nausea.   Endocrine: Negative for cold intolerance and heat intolerance.   Genitourinary:  Negative for dysuria, frequency, hematuria, pelvic pain and vaginal bleeding.   Musculoskeletal:  Negative for arthralgias, back pain, gait problem, joint swelling and myalgias.   Skin:  Negative for color change and rash.   Allergic/Immunologic: Negative for environmental allergies and food allergies.   Neurological:  Negative for dizziness, tremors, speech difficulty, weakness, numbness and headaches.   Hematological:  Does not bruise/bleed easily.   Psychiatric/Behavioral:  Negative for agitation, dysphoric mood and sleep disturbance. The patient is not nervous/anxious.       Past Medical History  She has a past medical history of Alopecia, Anxiety and depression, Bronchitis, Encounter for immunization (2016), Genital herpes, GERD (gastroesophageal reflux disease), Hypercholesterolemia, Hypertension, Irritable bowel syndrome, Migraines, Osteoarthritis, Peripheral neuropathy, Pulmonary nodule, Right lower quadrant pain (2016), Sinusitis, Tear film insufficiency, unspecified laterality, and Unspecified disorder of refraction.    Surgical History  She has a past surgical history that includes Laparoscopy diagnostic / biopsy / aspiration / lysis (10/09/2014); Tonsillectomy (10/09/2014); Dilation and curettage of uterus (10/09/2014); Total hip arthroplasty (Bilateral, ); Rotator cuff repair (Right, );  section, low transverse; Hysterectomy; Colonoscopy; Other surgical history (2023); Cataract extraction w/  intraocular lens implant; and Breast biopsy (Right, 2024).    Family History  Cancer-related family history includes Brain cancer in her mother; Breast cancer (age of onset: 41) in her sister; Breast cancer (age of onset: 49) in her sister; Colon cancer in her father's brother and father's sister; Prostate cancer  in her father.     Social History  She reports that she quit smoking about 12 years ago. Her smoking use included cigarettes. She started smoking about 22 years ago. She has a 10 pack-year smoking history. She has never been exposed to tobacco smoke. She has never used smokeless tobacco. She reports current alcohol use. She reports current drug use.    Allergies  Duloxetine, Indomethacin, Prednisone, and Sulfamethoxazole    Medications  Current Outpatient Medications   Medication Instructions    amitriptyline (ELAVIL) 50 mg, oral, Nightly    atorvastatin (LIPITOR) 80 mg, oral, Daily    citalopram (CELEXA) 40 mg, oral, Daily    cyclobenzaprine (FLEXERIL) 10 mg, Nightly    cyclobenzaprine (FLEXERIL) 5 mg, oral, Nightly    gabapentin (NEURONTIN) 100 mg, oral, Nightly, Take at bedtime.    lisinopriL-hydrochlorothiazide 10-12.5 mg tablet 1 tablet, oral, Daily    oxyCODONE (ROXICODONE) 5 mg, oral, Every 6 hours PRN    oxyCODONE-acetaminophen (Percocet) 5-325 mg tablet 1 tablet, Every 4 hours PRN    piroxicam (Feldene) 20 mg capsule 1 capsule, oral, Daily (0630)    propranolol LA (INDERAL LA) 120 mg, oral, Daily    rifAXIMin (XIFAXAN) 550 mg, oral, 3 times daily    rizatriptan (MAXALT) 10 mg, oral, Once as needed, May repeat in 2 hours if unresolved. Do not exceed 30 mg in 24 hours.    topiramate (Topamax) 25 mg tablet Take 1 tablet in the morning and 2 tablets at bedtime.    traMADol (ULTRAM) 50 mg, oral, 3 times daily PRN    tretinoin (Retin-A) 0.025 % cream 1 Application, Topical, Nightly, Start 1-2 nights per week 1-2 weeks, inc to every other night, then every night as tolerated    valACYclovir (VALTREX) 500 mg, oral, Daily    Vitamin D3 50 mcg (2,000 unit) capsule oral, Daily       Last Recorded Vitals  Vitals:    02/25/25 1028   BP: 107/68   Pulse: 65   Temp: 36.2 °C (97.2 °F)   SpO2: 99%         Physical Exam  Physical Exam  Patient is alert and oriented x3 and in an anxious mood. Her gait is steady and hand grasps  are equal. Sclera is clear. The breasts are asymmetrical, left larger. The tissue is soft without palpable abnormalities, discrete nodules or masses. The skin and nipples appear normal. There is no cervical, supraclavicular or axillary lymphadenopathy. Heart rate and rhythm normal, S1 and S2 appreciated. The lungs are clear to auscultation bilaterally. Abdomen is soft and non-tender.      Relevant Results and Imaging          I explained the results in depth, along with suggested explanation for follow up recommendations based on the testing results. BI-RADS Category 3    Visit Diagnosis  1. Heterogeneously dense tissue of both breasts on mammography  BI mammo bilateral diagnostic tomosynthesis    Clinic Appointment Request Follow Up    MR breast bilateral w contrast full protocol      2. Calcification of right breast on mammography  Clinic Appointment Request (high risk evaluation)    BI mammo bilateral diagnostic tomosynthesis    Clinic Appointment Request Follow Up    MR breast bilateral w contrast full protocol      3. Breast cancer screening, high risk patient  BI mammo bilateral diagnostic tomosynthesis    Clinic Appointment Request Follow Up    MR breast bilateral w contrast full protocol      4. Family history of breast cancer  BI mammo bilateral diagnostic tomosynthesis    Clinic Appointment Request Follow Up    MR breast bilateral w contrast full protocol      5. Abnormal finding on breast imaging  BI mammo bilateral diagnostic tomosynthesis    Clinic Appointment Request Follow Up    MR breast bilateral w contrast full protocol            RISK PROFILE    Assessment/Plan  Normal breast exam and stable imaging, benign core biopsy no breast surgery, family history breast cancer, heterogeneously dense breast tissue.     Declined endocrine therapy. Breast MRI due now.     Patient Discussion/Summary   Your clinical examination and imaging are stable. Please return in July for mammogram and office visit or sooner  if you have any problems or concerns.     High risk breast surveillance care plan:  Yearly mammogram with digital breast tomosynthesis  Twice yearly clinical breast examinations  Breast MRI-  Monthly self breast examinations &/or regular self breast awareness  Vitamin D3 2000 IU/daily (over the counter) unless your PCP recommends you take a specific dose  Exercise 3-4 times per week for 45-60 minutes  Limit alcohol to 3-4 drinks per week if you are menopausal  Eat healthy low-fat diet with lots of vegetable & fruits    Risk model indicates you are eligible for endocrine therapy with Tamoxifen, Raloxifene or Aromatase inhibitor. Endocrine therapy reduces lifetime risk of breast cancer by up to 50% when taken for 5 years. There is an alternative low dose Tamoxifen which can be taken for only 3 years.      IMPORTANT INFORMATION REGARDING YOUR RESULTS    If you receive medical information from My [facility] Personal Health Record (online chart) your results will be released into your chart. This means you may view or see results of your biopsy or procedure before I contact you directly. If this occurs, please call the office and we will discuss your results over the phone.     You can see your health information, review clinical summaries from office visits & test results online when you follow your health with MY  Chart, a personal health record. To sign up go to [email or web address]. If you need assistance with signing up or trouble getting into your account call [employer] Patient Line 24/7 at [phone].    My office phone number is [phone] if you need to get in touch with me or have additional questions or concerns. Thank you for choosing [facility] and trusting me as your healthcare provider. I look forward to seeing you again at your next office visit. I am honored to be a provider on your health care team and I remain dedicated to helping you achieve your health goals.      Nereida Berkowitz,  APRN-CNP

## 2025-02-24 ENCOUNTER — TELEPHONE (OUTPATIENT)
Dept: GASTROENTEROLOGY | Facility: CLINIC | Age: 60
End: 2025-02-24
Payer: COMMERCIAL

## 2025-02-24 DIAGNOSIS — K58.0 IRRITABLE BOWEL SYNDROME WITH DIARRHEA: ICD-10-CM

## 2025-02-24 NOTE — TELEPHONE ENCOUNTER
Pt is requesting Xifaxan 550 to be sent to pharmacy. She states she hasn't ate in 2 days and have severe diarrhea, cramping, gurgling. She states the the medication had helped her in the past.

## 2025-02-25 ENCOUNTER — HOSPITAL ENCOUNTER (OUTPATIENT)
Dept: RADIOLOGY | Facility: CLINIC | Age: 60
Discharge: HOME | End: 2025-02-25
Payer: COMMERCIAL

## 2025-02-25 ENCOUNTER — OFFICE VISIT (OUTPATIENT)
Dept: SURGICAL ONCOLOGY | Facility: CLINIC | Age: 60
End: 2025-02-25
Payer: COMMERCIAL

## 2025-02-25 VITALS
BODY MASS INDEX: 25.34 KG/M2 | HEART RATE: 65 BPM | DIASTOLIC BLOOD PRESSURE: 68 MMHG | OXYGEN SATURATION: 99 % | WEIGHT: 131.94 LBS | SYSTOLIC BLOOD PRESSURE: 107 MMHG | TEMPERATURE: 97.2 F

## 2025-02-25 DIAGNOSIS — R92.1 CALCIFICATION OF RIGHT BREAST ON MAMMOGRAPHY: ICD-10-CM

## 2025-02-25 DIAGNOSIS — R92.333 HETEROGENEOUSLY DENSE TISSUE OF BOTH BREASTS ON MAMMOGRAPHY: Primary | ICD-10-CM

## 2025-02-25 DIAGNOSIS — R92.8 ABNORMAL FINDING ON BREAST IMAGING: ICD-10-CM

## 2025-02-25 DIAGNOSIS — Z80.3 FAMILY HISTORY OF BREAST CANCER: ICD-10-CM

## 2025-02-25 DIAGNOSIS — Z12.39 BREAST CANCER SCREENING, HIGH RISK PATIENT: ICD-10-CM

## 2025-02-25 PROCEDURE — 99214 OFFICE O/P EST MOD 30 MIN: CPT

## 2025-02-25 PROCEDURE — 3074F SYST BP LT 130 MM HG: CPT

## 2025-02-25 PROCEDURE — 1036F TOBACCO NON-USER: CPT

## 2025-02-25 PROCEDURE — 77065 DX MAMMO INCL CAD UNI: CPT | Mod: RIGHT SIDE | Performed by: STUDENT IN AN ORGANIZED HEALTH CARE EDUCATION/TRAINING PROGRAM

## 2025-02-25 PROCEDURE — 77065 DX MAMMO INCL CAD UNI: CPT | Mod: RT

## 2025-02-25 PROCEDURE — 3078F DIAST BP <80 MM HG: CPT

## 2025-02-25 ASSESSMENT — PATIENT HEALTH QUESTIONNAIRE - PHQ9
1. LITTLE INTEREST OR PLEASURE IN DOING THINGS: NOT AT ALL
SUM OF ALL RESPONSES TO PHQ9 QUESTIONS 1 & 2: 0
2. FEELING DOWN, DEPRESSED OR HOPELESS: NOT AT ALL

## 2025-02-25 ASSESSMENT — PAIN SCALES - GENERAL: PAINLEVEL_OUTOF10: 0-NO PAIN

## 2025-02-25 NOTE — PATIENT INSTRUCTIONS
Your clinical examination and imaging are stable. Please return in July for mammogram and office visit or sooner if you have any problems or concerns.     High risk breast surveillance care plan:  Yearly mammogram with digital breast tomosynthesis  Twice yearly clinical breast examinations  Breast MRI-  Monthly self breast examinations &/or regular self breast awareness  Vitamin D3 2000 IU/daily (over the counter) unless your PCP recommends you take a specific dose  Exercise 3-4 times per week for 45-60 minutes  Limit alcohol to 3-4 drinks per week if you are menopausal  Eat healthy low-fat diet with lots of vegetable & fruits    Risk model indicates you are eligible for endocrine therapy with Tamoxifen, Raloxifene or Aromatase inhibitor. Endocrine therapy reduces lifetime risk of breast cancer by up to 50% when taken for 5 years. There is an alternative low dose Tamoxifen which can be taken for only 3 years.      IMPORTANT INFORMATION REGARDING YOUR RESULTS    If you receive medical information from My [facility] Personal Health Record (online chart) your results will be released into your chart. This means you may view or see results of your biopsy or procedure before I contact you directly. If this occurs, please call the office and we will discuss your results over the phone.     You can see your health information, review clinical summaries from office visits & test results online when you follow your health with MY  Chart, a personal health record. To sign up go to [email or web address]. If you need assistance with signing up or trouble getting into your account call [employer] Patient Line 24/7 at [phone].    My office phone number is [phone] if you need to get in touch with me or have additional questions or concerns. Thank you for choosing [facility] and trusting me as your healthcare provider. I look forward to seeing you again at your next office visit. I am honored to be a provider  on your health care team and I remain dedicated to helping you achieve your health goals.

## 2025-02-27 NOTE — PROGRESS NOTES
[facility]  Hand and Upper Extremity Service  Follow up visit         Follow up for: Left thumb    Interval History: She returns for her left thumb. She received a left thumb CMC injection at last visit. She presents for repeat injection and she's not ready to consider surgery for her thumb. She has surgery scheduled for her left side TMJ arthroplasty. She also needs a new comfort cool splint as her old one is torn and doesn't fasten.               Past medical history, medications, allergies, surgical history and review of systems are reviewed and otherwise unchanged when compared to last visit on 12/9/24         Examination:  Constitutional: Oriented to person, place, and time.  Appears well-developed and well-nourished.  Head: Normocephalic and atraumatic.  Eyes: Pupils are equal, round, and reactive to light.  Cardiovascular: Intact distal pulses.  Pulmonary/Chest/Breast: Effort normal. No respiratory distress.  Neurological: Alert and oriented to person, place, and time.  Skin: Skin is warm and dry.  Psychiatric: normal mood and affect.  Behavior is normal.  Musculoskeletal: Left hand reveals positive thumb CMC grind test with crepitus and pain. Good MP joint stability.       Impression: Left thumb CMC joint arthritis       Plan: We gave her a left thumb CMC injection and a left comfort cool splint. She'll return for repeat injections or when she's ready to schedule left thumb CMC arthroplasty.       In Office Procedures Performed: Left thumb CMC joint injection  S Inj/Asp: L thumb CMC on 3/3/2025 7:09 PM  Indications: pain  Details: 25 G needle, dorsal approach  Medications: 20 mg triamcinolone acetonide 40 mg/mL; 0.5 mL lidocaine 10 mg/mL (1 %)  Outcome: tolerated well, no immediate complications  Procedure, treatment alternatives, risks and benefits explained, specific risks discussed. Consent was given by the patient. Immediately prior to procedure a time out was called to  verify the correct patient, procedure, equipment, support staff and site/side marked as required. Patient was prepped and draped in the usual sterile fashion.             Follow up: As needed             Jurgen Munguia MD  [facility]  Department of Orthopaedic Surgery  Hand and Upper Extremity Reconstruction    Scribe Attestation  By signing my name below, I Cassi Bradley , Scribe   attest that this documentation has been prepared under the direction and in the presence of Dr. Jurgen Munguia.    Dictation performed with the use of voice recognition software.  Syntax and grammatical errors may exist.

## 2025-03-03 ENCOUNTER — APPOINTMENT (OUTPATIENT)
Dept: ORTHOPEDIC SURGERY | Facility: CLINIC | Age: 60
End: 2025-03-03
Payer: COMMERCIAL

## 2025-03-03 VITALS — BODY MASS INDEX: 24.73 KG/M2 | WEIGHT: 131 LBS | HEIGHT: 61 IN

## 2025-03-03 DIAGNOSIS — M19.049 ARTHRITIS OF CARPOMETACARPAL JOINT: Primary | ICD-10-CM

## 2025-03-03 PROCEDURE — 99213 OFFICE O/P EST LOW 20 MIN: CPT | Performed by: ORTHOPAEDIC SURGERY

## 2025-03-03 PROCEDURE — 20600 DRAIN/INJ JOINT/BURSA W/O US: CPT | Performed by: ORTHOPAEDIC SURGERY

## 2025-03-03 PROCEDURE — 3008F BODY MASS INDEX DOCD: CPT | Performed by: ORTHOPAEDIC SURGERY

## 2025-03-03 PROCEDURE — 1036F TOBACCO NON-USER: CPT | Performed by: ORTHOPAEDIC SURGERY

## 2025-03-03 RX ORDER — LIDOCAINE HYDROCHLORIDE 10 MG/ML
0.5 INJECTION, SOLUTION INFILTRATION; PERINEURAL
Status: COMPLETED | OUTPATIENT
Start: 2025-03-03 | End: 2025-03-03

## 2025-03-03 RX ORDER — TRIAMCINOLONE ACETONIDE 40 MG/ML
20 INJECTION, SUSPENSION INTRA-ARTICULAR; INTRAMUSCULAR
Status: COMPLETED | OUTPATIENT
Start: 2025-03-03 | End: 2025-03-03

## 2025-03-03 RX ADMIN — LIDOCAINE HYDROCHLORIDE 0.5 ML: 10 INJECTION, SOLUTION INFILTRATION; PERINEURAL at 19:09

## 2025-03-03 RX ADMIN — TRIAMCINOLONE ACETONIDE 20 MG: 40 INJECTION, SUSPENSION INTRA-ARTICULAR; INTRAMUSCULAR at 19:09

## 2025-03-03 ASSESSMENT — PAIN - FUNCTIONAL ASSESSMENT: PAIN_FUNCTIONAL_ASSESSMENT: 0-10

## 2025-03-03 ASSESSMENT — PAIN DESCRIPTION - DESCRIPTORS: DESCRIPTORS: SORE;ACHING

## 2025-03-03 ASSESSMENT — PAIN SCALES - GENERAL: PAINLEVEL_OUTOF10: 5 - MODERATE PAIN

## 2025-03-05 DIAGNOSIS — E55.9 MILD VITAMIN D DEFICIENCY: ICD-10-CM

## 2025-03-05 RX ORDER — NICOTINE 11MG/24HR
PATCH, TRANSDERMAL 24 HOURS TRANSDERMAL DAILY
Qty: 90 CAPSULE | Refills: 0 | Status: SHIPPED | OUTPATIENT
Start: 2025-03-05

## 2025-03-11 DIAGNOSIS — M26.69 INTERNAL DERANGEMENT OF TEMPOROMANDIBULAR JOINT: Primary | ICD-10-CM

## 2025-03-11 DIAGNOSIS — G43.709 CHRONIC MIGRAINE WITHOUT AURA WITHOUT STATUS MIGRAINOSUS, NOT INTRACTABLE: ICD-10-CM

## 2025-03-11 RX ORDER — AMITRIPTYLINE HYDROCHLORIDE 50 MG/1
50 TABLET, FILM COATED ORAL NIGHTLY
Qty: 90 TABLET | Refills: 0 | Status: SHIPPED | OUTPATIENT
Start: 2025-03-11

## 2025-03-12 ENCOUNTER — HOSPITAL ENCOUNTER (OUTPATIENT)
Dept: RADIOLOGY | Facility: HOSPITAL | Age: 60
Discharge: HOME | End: 2025-03-12
Payer: COMMERCIAL

## 2025-03-12 DIAGNOSIS — M26.69 INTERNAL DERANGEMENT OF TEMPOROMANDIBULAR JOINT: ICD-10-CM

## 2025-03-12 PROCEDURE — 70486 CT MAXILLOFACIAL W/O DYE: CPT

## 2025-03-13 ENCOUNTER — APPOINTMENT (OUTPATIENT)
Dept: NEUROLOGY | Facility: CLINIC | Age: 60
End: 2025-03-13
Payer: COMMERCIAL

## 2025-03-13 VITALS
HEART RATE: 68 BPM | BODY MASS INDEX: 24.92 KG/M2 | DIASTOLIC BLOOD PRESSURE: 80 MMHG | HEIGHT: 61 IN | WEIGHT: 132 LBS | SYSTOLIC BLOOD PRESSURE: 126 MMHG

## 2025-03-13 DIAGNOSIS — M54.2 CERVICALGIA: Primary | ICD-10-CM

## 2025-03-13 DIAGNOSIS — G60.9 IDIOPATHIC PERIPHERAL NEUROPATHY: ICD-10-CM

## 2025-03-13 PROCEDURE — 1036F TOBACCO NON-USER: CPT | Performed by: PSYCHIATRY & NEUROLOGY

## 2025-03-13 PROCEDURE — 3074F SYST BP LT 130 MM HG: CPT | Performed by: PSYCHIATRY & NEUROLOGY

## 2025-03-13 PROCEDURE — 3079F DIAST BP 80-89 MM HG: CPT | Performed by: PSYCHIATRY & NEUROLOGY

## 2025-03-13 PROCEDURE — 3008F BODY MASS INDEX DOCD: CPT | Performed by: PSYCHIATRY & NEUROLOGY

## 2025-03-13 PROCEDURE — 99214 OFFICE O/P EST MOD 30 MIN: CPT | Performed by: PSYCHIATRY & NEUROLOGY

## 2025-03-13 RX ORDER — GABAPENTIN 100 MG/1
100 CAPSULE ORAL NIGHTLY
Qty: 90 CAPSULE | Refills: 0 | Status: SHIPPED | OUTPATIENT
Start: 2025-03-13 | End: 2026-03-13

## 2025-03-13 NOTE — PATIENT INSTRUCTIONS
You have symptomatic peripheral neuropathy, controlled with your current medications.  Schedule the plain x-rays of your neck, and try physical therapy.  Follow up in 6 months, call the office if you need to ask for advice.

## 2025-03-13 NOTE — PROGRESS NOTES
"Subjective   Nereida Conway is a 60 y.o. right-handed female.  HPI  This is a 60 year old woman has a PN, migraines, last seen in 7/2024.  She has \"clumping\" in the toes, occasional cramping, with numbness and tingling, rarely gets discomfort in the lower extremities.  She is satisfied with her current medications, including the 100 mg of gabapentin at bedtime.  Her balance is alright.  Her neck is crunching and crackles.  Objective   Neurological Exam  Alert and cooperative middle aged woman  Normal speech.  Neck has normal horizontal eye movements.  Normal strength in all 4 extremities.  Reflexes: 2/4 in arms, 1/4 patellar, 0/4 ankle reflexes.  Gait is normal  Physical Exam  I personally reviewed laboratory, radiographic, and medical studies which were pertinent for nothing.    Assessment/Plan     "

## 2025-03-26 ENCOUNTER — HOSPITAL ENCOUNTER (OUTPATIENT)
Dept: RADIOLOGY | Facility: HOSPITAL | Age: 60
Discharge: HOME | End: 2025-03-26
Payer: COMMERCIAL

## 2025-03-26 DIAGNOSIS — M54.2 CERVICALGIA: ICD-10-CM

## 2025-03-26 PROCEDURE — 72050 X-RAY EXAM NECK SPINE 4/5VWS: CPT

## 2025-03-28 NOTE — RESULT ENCOUNTER NOTE
Please tell the patient that her neck x-rays show degenerative (arthritic) changes- she should go ahead and try the physical therapy and keep me informed with her progress- may possibly need a MRI of the neck if not improved with physical therapy.

## 2025-04-02 ENCOUNTER — HOSPITAL ENCOUNTER (OUTPATIENT)
Dept: RADIOLOGY | Facility: HOSPITAL | Age: 60
Discharge: HOME | End: 2025-04-02
Payer: COMMERCIAL

## 2025-04-02 ENCOUNTER — CLINICAL SUPPORT (OUTPATIENT)
Dept: PREADMISSION TESTING | Facility: HOSPITAL | Age: 60
End: 2025-04-02
Payer: COMMERCIAL

## 2025-04-02 DIAGNOSIS — R92.333 HETEROGENEOUSLY DENSE TISSUE OF BOTH BREASTS ON MAMMOGRAPHY: ICD-10-CM

## 2025-04-02 DIAGNOSIS — R92.8 ABNORMAL FINDING ON BREAST IMAGING: ICD-10-CM

## 2025-04-02 DIAGNOSIS — Z12.39 BREAST CANCER SCREENING, HIGH RISK PATIENT: ICD-10-CM

## 2025-04-02 DIAGNOSIS — Z80.3 FAMILY HISTORY OF BREAST CANCER: ICD-10-CM

## 2025-04-02 DIAGNOSIS — R92.1 CALCIFICATION OF RIGHT BREAST ON MAMMOGRAPHY: ICD-10-CM

## 2025-04-02 PROCEDURE — 77049 MRI BREAST C-+ W/CAD BI: CPT

## 2025-04-02 PROCEDURE — A9575 INJ GADOTERATE MEGLUMI 0.1ML: HCPCS

## 2025-04-02 PROCEDURE — 2550000001 HC RX 255 CONTRASTS

## 2025-04-02 RX ORDER — IBUPROFEN 100 MG/5ML
1000 SUSPENSION, ORAL (FINAL DOSE FORM) ORAL DAILY
COMMUNITY

## 2025-04-02 RX ORDER — GADOTERATE MEGLUMINE 376.9 MG/ML
12 INJECTION INTRAVENOUS
Status: COMPLETED | OUTPATIENT
Start: 2025-04-02 | End: 2025-04-02

## 2025-04-02 RX ORDER — BISMUTH SUBSALICYLATE 262 MG
1 TABLET,CHEWABLE ORAL DAILY
COMMUNITY

## 2025-04-02 RX ADMIN — GADOTERATE MEGLUMINE 12 ML: 376.9 INJECTION INTRAVENOUS at 10:38

## 2025-04-02 NOTE — CPM/PAT NURSE NOTE
CPM/PAT Nurse Note      Name: Nereida Conway (Nereida Conway)  /Age: 1965/60 y.o.       Past Medical History:   Diagnosis Date    Alopecia     Anxiety and depression     Bronchitis     DDD (degenerative disc disease), lumbar     DJD (degenerative joint disease)     Encounter for immunization 2016    Need for Tdap vaccination    Genital herpes     GERD (gastroesophageal reflux disease)     Glaucoma (increased eye pressure)     pre-glaucoma    Hypercholesterolemia     Hypertension     Irritable bowel syndrome     Jaw pain     Migraines     Neck pain     OA (osteoarthritis)     PAD (peripheral artery disease) (CMS-[facility])     Peripheral neuropathy     N/T feet/toes    Polyarthralgia     Pulmonary nodule     Raynaud's disease     Sinusitis     Vitamin D deficiency        Past Surgical History:   Procedure Laterality Date    BREAST BIOPSY Right 2024    Benign    CATARACT EXTRACTION W/  INTRAOCULAR LENS IMPLANT Right     OD 2023 +13.0D     SECTION, LOW TRANSVERSE      COLONOSCOPY          DILATION AND CURETTAGE OF UTERUS  10/09/2014    HYSTERECTOMY      LAPAROSCOPY DIAGNOSTIC / BIOPSY / ASPIRATION / LYSIS  10/09/2014    stomach Laparoscopy (Diagnostic)     OTHER SURGICAL HISTORY  2023    Right thumb joint replacement    ROTATOR CUFF REPAIR Right 2017    TONSILLECTOMY  10/09/2014    Tonsillectomy    TOTAL HIP ARTHROPLASTY Bilateral 2019       Patient  reports being sexually active and has had partner(s) who are male.    Family History   Problem Relation Name Age of Onset    Brain cancer Mother      Prostate cancer Father      Breast cancer Sister Chelly Weber 41        INVASIVE PLEOMORPHIC DUCTAL    Breast cancer Sister Jordyn Conway 49        DCIS    Colon cancer Father's Sister      Colon cancer Father's Brother         Allergies   Allergen Reactions    Duloxetine Other and Hallucinations     severe diaphoresis    Indomethacin GI Upset and Other     severe gi upset    Prednisone  Swelling    Sulfamethoxazole Rash       Prior to Admission medications    Medication Sig Start Date End Date Taking? Authorizing Provider   amitriptyline (Elavil) 50 mg tablet TAKE ONE TABLET BY MOUTH EVERY DAY AT BEDTIME 3/11/25   Lise Jeff PA-C   ascorbic acid (Vitamin C) 1,000 mg tablet Take 1 tablet (1,000 mg) by mouth once daily.    Historical Provider, MD   atorvastatin (Lipitor) 80 mg tablet take one tablet by mouth every day 1/22/25   Lise Jeff PA-C   carboxymethylcellulose sodium (REFRESH TEARS OPHT) Administer into affected eye(s) if needed.    Historical Provider, MD   citalopram (CeleXA) 40 mg tablet TAKE 1 TABLET (40 MG) BY MOUTH ONCE DAILY 2/6/25   Lise Jeff PA-C   cyclobenzaprine (Flexeril) 10 mg tablet Take 0.5 tablets (5 mg) by mouth once daily at bedtime.  Patient taking differently: Take 0.5 tablets (5 mg) by mouth 3 times a day as needed. 2/18/25 3/20/25  Melinda Zimmer, TAMARA   gabapentin (Neurontin) 100 mg capsule Take 1 capsule (100 mg) by mouth once daily at bedtime. Take at bedtime. 3/13/25 3/13/26  Raghav Gillette MD   lisinopriL-hydrochlorothiazide 10-12.5 mg tablet Take 1 tablet by mouth once daily. 8/29/24 10/3/25  Lise Jeff PA-C   multivitamin tablet Take 1 tablet by mouth once daily.    Historical Provider, MD   propranolol LA (Inderal LA) 120 mg 24 hr capsule TAKE ONE CAPSULE BY MOUTH ONCE DAILY 1/22/25   Lise Jeff PA-C   rifAXIMin (Xifaxan) 550 mg tablet Take 1 tablet (550 mg) by mouth 3 times a day.  Patient taking differently: Take 1 tablet (550 mg) by mouth if needed. 2/24/25   Pedro Durant MD   rizatriptan (Maxalt) 10 mg tablet Take 1 tablet (10 mg) by mouth 1 time if needed for migraine for up to 60 doses. May repeat in 2 hours if unresolved. Do not exceed 30 mg in 24 hours. 2/13/25   Lise Jeff PA-C   topiramate (Topamax) 25 mg tablet Take 1 tablet in the morning and 2 tablets at bedtime. 10/29/24   Raghav Gillette MD    traMADol (Ultram) 50 mg tablet Take 1 tablet (50 mg) by mouth 3 times a day as needed for severe pain (7 - 10) for up to 200 doses. 2/13/25   Jurgen Munguia MD   tretinoin (Retin-A) 0.025 % cream Apply 1 Application topically once daily at bedtime. Start 1-2 nights per week 1-2 weeks, inc to every other night, then every night as tolerated  Patient not taking: Reported on 4/2/2025 2/12/24   Claudio Chavez MD   valACYclovir (Valtrex) 500 mg tablet TAKE 1 TABLET (500 mg) BY MOUTH ONCE DAILY 1/13/25   Lise Jeff PA-C   Vitamin D3 50 mcg (2,000 unit) capsule TAKE 1 CAPSULE BY MOUTH ONCE A DAY 3/5/25   DARIAN Glaser     DASI Risk Score      Flowsheet Row Questionnaire Series Submission from 3/5/2025 in Virtual Care with Generic Provider Mychart Questionnaire Series Submission from 12/27/2023 in Virtual Care with Generic Provider [place]   Can you take care of yourself (eat, dress, bathe, or use toilet)?  2.75  filed at 03/05/2025 2344 2.75  filed at 12/27/2023 1350   Can you walk indoors, such as around your house? 1.75  filed at 03/05/2025 2344 1.75  filed at 12/27/2023 1350   Can you walk a block or two on level ground?  2.75  filed at 03/05/2025 2344 2.75  filed at 12/27/2023 1350   Can you climb a flight of stairs or walk up a hill? 5.5  filed at 03/05/2025 2344 5.5  filed at 12/27/2023 1350   Can you run a short distance? 8  filed at 03/05/2025 2344 8  filed at 12/27/2023 1350   Can you do light work around the house like dusting or washing dishes? 2.7  filed at 03/05/2025 2344 2.7  filed at 12/27/2023 1350   Can you do moderate work around the house like vacuuming, sweeping floors or carrying groceries? 3.5  filed at 03/05/2025 2344 3.5  filed at 12/27/2023 1350   Can you do heavy work around the house like scrubbing floors or lifting and moving heavy furniture?  8  filed at 03/05/2025 2344 0  filed at 12/27/2023 1350   Can you do yard work like raking leaves, weeding or pushing  a mower? 4.5  filed at 03/05/2025 2344 4.5  filed at 12/27/2023 1350   Can you have sexual relations? 5.25  filed at 03/05/2025 2344 5.25  filed at 12/27/2023 1350   Can you participate in moderate recreational activities like golf, bowling, dancing, doubles tennis or throwing a baseball or football? 6  filed at 03/05/2025 2344 6  filed at 12/27/2023 1350   Can you participate in strenous sports like swimming, singles tennis, football, basketball, or skiing? 0  filed at 03/05/2025 2344 0  filed at 12/27/2023 1350   DASI SCORE 50.7  filed at 03/05/2025 2344 42.7 filed at 12/27/2023 1350   METS Score (Will be calculated only when all the questions are answered) 9  filed at 03/05/2025 2344 8 filed at 12/27/2023 1350          Caprini DVT Assessment      Flowsheet Row Pre-Admission Testing from 1/18/2024 in [facility]   DVT Score (IF A SCORE IS NOT CALCULATING, MUST SELECT A BMI TO COMPLETE) 5 filed at 01/18/2024 0908   BMI (BMI MUST BE CHOSEN) 30 or less filed at 01/18/2024 0908   RETIRED: Current Status Minor surgery planned filed at 01/18/2024 0908   RETIRED: History Prior major surgery, Inflammatory bowel disease filed at 01/18/2024 0908   RETIRED: Age 40-59 years filed at 01/18/2024 0908          Modified Frailty Index    No data to display       [id number] Stroke Risk Points  Current as of just now        N/A 0 to 9 Points:      Last Change: N/A          The [id number] risk score (Lip GH, et al. 2009. © 2010 [employer]) quantifies the risk of stroke for a patient with atrial fibrillation. For patients without atrial fibrillation or under the age of 18 this score appears as N/A. Higher score values generally indicate higher risk of stroke.        This score is not applicable to this patient. Components are not calculated.          Revised Cardiac Risk Index      Flowsheet Row Pre-Admission Testing from 1/18/2024 in [facility]   High-Risk Surgery  (Intraperitoneal, Intrathoracic,Suprainguinal vascular) 0 filed at 01/18/2024 0911   History of ischemic heart disease (History of MI, History of positive exercuse test, Current chest paint considered due to myocardial ischemia, Use of nitrate therapy, ECG with pathological Q Waves) 0 filed at 01/18/2024 0911   History of congestive heart failure (pulmonary edemia, bilateral rales or S3 gallop, Paroxysmal nocturnal dyspnea, CXR showing pulmonary vascular redistribution) 0 filed at 01/18/2024 0911   History of cerebrovascular disease (Prior TIA or stroke) 0 filed at 01/18/2024 0911   Pre-operative insulin treatment 0 filed at 01/18/2024 0911   Pre-operative creatinine>2 mg/dl 0 filed at 01/18/2024 0911   Revised Cardiac Risk Calculator 0 filed at 01/18/2024 0911          Apfel Simplified Score      Flowsheet Row Pre-Admission Testing from 1/18/2024 in [facility]   Smoking status 1 filed at 01/18/2024 0912   History of motion sickness or PONV  0 filed at 01/18/2024 0912   Use of postoperative opioids 0 filed at 01/18/2024 0912   Apfel Simplified Score Calculator 2 filed at 01/18/2024 0912          Risk Analysis Index Results This Encounter    No data found in the last 10 encounters.       Stop Bang Score      Flowsheet Row Questionnaire Series Submission from 3/5/2025 in [facility] with Generic Provider Zheng Pre-Admission Testing from 1/18/2024 in [facility]   Do you snore loudly? 1  filed at 03/05/2025 2344 0 filed at 01/18/2024 0840   Do you often feel tired or fatigued after your sleep? 0  filed at 03/05/2025 2344 0 filed at 01/18/2024 0840   Has anyone ever observed you stop breathing in your sleep? 0  filed at 03/05/2025 2344 0 filed at 01/18/2024 0840   Do you have or are you being treated for high blood pressure? 1  filed at 03/05/2025 2344 1 filed at 01/18/2024 0840   Recent BMI (Calculated) 25.2  filed at 03/05/2025 2344 25.6 filed at 01/18/2024 0840   Is BMI greater  than 35 kg/m2? 0=No  filed at 03/05/2025 2344 0=No filed at 01/18/2024 0840   Age older than 50 years old? 1=Yes  filed at 03/05/2025 2344 1=Yes filed at 01/18/2024 0840   Is your neck circumference greater than 17 inches (Male) or 16 inches (Female)? -- 0 filed at 01/18/2024 0840   Gender - Male 0=No  filed at 03/05/2025 2344 0=No filed at 01/18/2024 0840   STOP-BANG Total Score -- 2 filed at 01/18/2024 0840          Prodigy: High Risk  Total Score: 11              Prodigy Age Score      Prodigy Previous Opioid Use Score           ARISCAT Score for Postoperative Pulmonary Complications    No data to display       Rushing Perioperative Risk for Myocardial Infarction or Cardiac Arrest (DANN)    No data to display         Nurse Plan of Action:     RN screening call complete.  Reviewed allergies, medications and pharmacy, medical, surgical and social history with patient.  Chart updated.

## 2025-04-03 ENCOUNTER — TELEPHONE (OUTPATIENT)
Dept: SURGICAL ONCOLOGY | Facility: HOSPITAL | Age: 60
End: 2025-04-03
Payer: COMMERCIAL

## 2025-04-03 NOTE — TELEPHONE ENCOUNTER
Result Communication    Resulted Orders   MR breast bilateral w contrast full protocol    Narrative    Interpreted By:  Maris Echeverria,   STUDY:  BI MR BREAST BILATERAL WITH CONTRAST FULL PROTOCOL;  4/2/2025 10:44 am      ACCESSION NUMBER(S):  [id number]      ORDERING CLINICIAN:  MICHELLE GAINES      INDICATION:  Greater than 20% lifetime risk of breast cancer. Benign right core  biopsy. Dense breast tissue on mammography. Baseline MRI.      ,R92.1 Mammographic calcification found on diagnostic imaging of  breast,R92.333 Mammographic heterogeneous density, bilateral  breasts,Z12.39 Encounter for other screening for malignant neoplasm  of breast,Z80.3 Family history of malignant neoplasm of breast,R92.8  Other abnormal and inconclusive findings on diagnostic imaging of  breast      COMPARISON:  None.      TECHNIQUE:  Using a dedicated breast coil, STIR axial and T1-weighted fat  saturation axial images of the breasts were obtained, the latter both  before and after intravenous administration of Gadolinium DTPA. On an  independent workstation, 3-D images were formulated using [employer]  including time enhancement curves, subtraction images and MIP images.      Intravenous contrast: 12 ML of Dotarem      FINDINGS:  Density: Heterogeneous fibroglandular tissue.      There is symmetric minimal bilateral background enhancement.      RIGHT BREAST: There is signal void from a tissue marker in the upper  outer quadrant from recent benign biopsy. No suspicious mass or  nonmass enhancement is identified.      No axillary or internal mammary lymphadenopathy is appreciated.      LEFT BREAST:  No suspicious mass or nonmass enhancement is identified.      No axillary or internal mammary lymphadenopathy is appreciated.      NON-BREAST FINDINGS:  None.        Impression    No MRI evidence of malignancy in either breast.      BI-RADS CATEGORY:  BI-RADS Category:  1 Negative.  Recommendation:  Annual Screening.  Recommended Date:   1 Year.  Laterality:  Bilateral.      For any future breast imaging appointments, please call [phone]  ([phone]).          MACRO:  None      Signed by: Maris Echeverria 4/3/2025 8:53 AM  Dictation workstation:   [id number]       9:20 AM      Results were successfully communicated with the patient and they acknowledged their understanding.

## 2025-04-09 ENCOUNTER — APPOINTMENT (OUTPATIENT)
Dept: PREADMISSION TESTING | Facility: HOSPITAL | Age: 60
End: 2025-04-09
Payer: COMMERCIAL

## 2025-04-14 ENCOUNTER — PRE-ADMISSION TESTING (OUTPATIENT)
Dept: PREADMISSION TESTING | Facility: HOSPITAL | Age: 60
End: 2025-04-14
Payer: COMMERCIAL

## 2025-04-14 ENCOUNTER — LAB (OUTPATIENT)
Dept: LAB | Facility: HOSPITAL | Age: 60
End: 2025-04-14
Payer: COMMERCIAL

## 2025-04-14 VITALS
HEIGHT: 60 IN | HEART RATE: 68 BPM | TEMPERATURE: 98.4 F | RESPIRATION RATE: 12 BRPM | WEIGHT: 132.28 LBS | OXYGEN SATURATION: 99 % | DIASTOLIC BLOOD PRESSURE: 73 MMHG | BODY MASS INDEX: 25.97 KG/M2 | SYSTOLIC BLOOD PRESSURE: 109 MMHG

## 2025-04-14 DIAGNOSIS — Z01.818 PREOP TESTING: Primary | ICD-10-CM

## 2025-04-14 DIAGNOSIS — Z01.818 ENCOUNTER FOR OTHER PREPROCEDURAL EXAMINATION: Primary | ICD-10-CM

## 2025-04-14 LAB
ANION GAP SERPL CALC-SCNC: 12 MMOL/L (ref 10–20)
BASOPHILS # BLD AUTO: 0.05 X10*3/UL (ref 0–0.1)
BASOPHILS NFR BLD AUTO: 0.5 %
BUN SERPL-MCNC: 13 MG/DL (ref 6–23)
CALCIUM SERPL-MCNC: 11 MG/DL (ref 8.6–10.3)
CHLORIDE SERPL-SCNC: 108 MMOL/L (ref 98–107)
CO2 SERPL-SCNC: 27 MMOL/L (ref 21–32)
CREAT SERPL-MCNC: 1 MG/DL (ref 0.5–1.05)
EGFRCR SERPLBLD CKD-EPI 2021: 64 ML/MIN/1.73M*2
EOSINOPHIL # BLD AUTO: 0.15 X10*3/UL (ref 0–0.7)
EOSINOPHIL NFR BLD AUTO: 1.5 %
ERYTHROCYTE [DISTWIDTH] IN BLOOD BY AUTOMATED COUNT: 12.1 % (ref 11.5–14.5)
EST. AVERAGE GLUCOSE BLD GHB EST-MCNC: 97 MG/DL
GLUCOSE SERPL-MCNC: 91 MG/DL (ref 74–99)
HBA1C MFR BLD: 5 %
HCT VFR BLD AUTO: 41.6 % (ref 36–46)
HGB BLD-MCNC: 14.1 G/DL (ref 12–16)
IMM GRANULOCYTES # BLD AUTO: 0.04 X10*3/UL (ref 0–0.7)
IMM GRANULOCYTES NFR BLD AUTO: 0.4 % (ref 0–0.9)
LYMPHOCYTES # BLD AUTO: 1.56 X10*3/UL (ref 1.2–4.8)
LYMPHOCYTES NFR BLD AUTO: 15.6 %
MCH RBC QN AUTO: 33.7 PG (ref 26–34)
MCHC RBC AUTO-ENTMCNC: 33.9 G/DL (ref 32–36)
MCV RBC AUTO: 100 FL (ref 80–100)
MONOCYTES # BLD AUTO: 0.81 X10*3/UL (ref 0.1–1)
MONOCYTES NFR BLD AUTO: 8.1 %
NEUTROPHILS # BLD AUTO: 7.42 X10*3/UL (ref 1.2–7.7)
NEUTROPHILS NFR BLD AUTO: 73.9 %
NRBC BLD-RTO: 0 /100 WBCS (ref 0–0)
PLATELET # BLD AUTO: 175 X10*3/UL (ref 150–450)
POTASSIUM SERPL-SCNC: 4.4 MMOL/L (ref 3.5–5.3)
RBC # BLD AUTO: 4.18 X10*6/UL (ref 4–5.2)
SODIUM SERPL-SCNC: 143 MMOL/L (ref 136–145)
WBC # BLD AUTO: 10 X10*3/UL (ref 4.4–11.3)

## 2025-04-14 PROCEDURE — 83036 HEMOGLOBIN GLYCOSYLATED A1C: CPT

## 2025-04-14 PROCEDURE — 85025 COMPLETE CBC W/AUTO DIFF WBC: CPT

## 2025-04-14 PROCEDURE — 87081 CULTURE SCREEN ONLY: CPT | Mod: AHULAB | Performed by: PHYSICIAN ASSISTANT

## 2025-04-14 PROCEDURE — 93010 ELECTROCARDIOGRAM REPORT: CPT | Performed by: INTERNAL MEDICINE

## 2025-04-14 PROCEDURE — 80048 BASIC METABOLIC PNL TOTAL CA: CPT

## 2025-04-14 PROCEDURE — 93005 ELECTROCARDIOGRAM TRACING: CPT | Performed by: PHYSICIAN ASSISTANT

## 2025-04-14 ASSESSMENT — ENCOUNTER SYMPTOMS
WHEEZING: 0
TROUBLE SWALLOWING: 0
PALPITATIONS: 0
SKIN CHANGES: 0
NAUSEA: 0
VOMITING: 0
EYE DISCHARGE: 0
ARTHRALGIAS: 1
BRUISES/BLEEDS EASILY: 0
CHILLS: 0
LIGHT-HEADEDNESS: 0
ABDOMINAL DISTENTION: 0
VISUAL CHANGE: 0
DIARRHEA: 0
CONFUSION: 0
COUGH: 0
MYALGIAS: 0
WOUND: 0
NECK PAIN: 1
HEMOPTYSIS: 0
NUMBNESS: 0
CONSTIPATION: 0
LIMITED RANGE OF MOTION: 0
DYSPNEA AT REST: 0
EXCESSIVE BLEEDING: 0
EYE PAIN: 0
DIFFICULTY URINATING: 0
SINUS CONGESTION: 0
RHINORRHEA: 0
UNEXPECTED WEIGHT CHANGE: 0
DOUBLE VISION: 0
FEVER: 0
ABDOMINAL PAIN: 0
WEAKNESS: 0
NECK STIFFNESS: 0
SHORTNESS OF BREATH: 0
DYSURIA: 0
BLOOD IN STOOL: 0
DYSPNEA WITH EXERTION: 0

## 2025-04-14 NOTE — PREPROCEDURE INSTRUCTIONS
Medication List            Accurate as of April 14, 2025 10:45 AM. Always use your most recent med list.                amitriptyline 50 mg tablet  Commonly known as: Elavil  TAKE ONE TABLET BY MOUTH EVERY DAY AT BEDTIME  Medication Adjustments for Surgery: Take/Use as prescribed     ascorbic acid 1,000 mg tablet  Commonly known as: Vitamin C  Additional Medication Adjustments for Surgery: Take last dose 7 days before surgery     atorvastatin 80 mg tablet  Commonly known as: Lipitor  take one tablet by mouth every day  Medication Adjustments for Surgery: Take on the morning of surgery     citalopram 40 mg tablet  Commonly known as: CeleXA  TAKE 1 TABLET (40 MG) BY MOUTH ONCE DAILY  Medication Adjustments for Surgery: Take/Use as prescribed     cyclobenzaprine 10 mg tablet  Commonly known as: Flexeril  Take 0.5 tablets (5 mg) by mouth once daily at bedtime.  Medication Adjustments for Surgery: Take on the morning of surgery  Notes to patient: If needed     gabapentin 100 mg capsule  Commonly known as: Neurontin  Take 1 capsule (100 mg) by mouth once daily at bedtime. Take at bedtime.  Medication Adjustments for Surgery: Take/Use as prescribed     lisinopriL-hydrochlorothiazide 10-12.5 mg tablet  Take 1 tablet by mouth once daily.  Notes to patient: HOLD evening prior to surgery and morning of surgery        multivitamin tablet  Additional Medication Adjustments for Surgery: Take last dose 7 days before surgery     propranolol  mg 24 hr capsule  Commonly known as: Inderal LA  TAKE ONE CAPSULE BY MOUTH ONCE DAILY  Medication Adjustments for Surgery: Take on the morning of surgery     REFRESH TEARS OPHT  Medication Adjustments for Surgery: Take/Use as prescribed     rifAXIMin 550 mg tablet  Commonly known as: Xifaxan  Take 1 tablet (550 mg) by mouth 3 times a day.  Medication Adjustments for Surgery: Take/Use as prescribed     rizatriptan 10 mg tablet  Commonly known as: Maxalt  Take 1 tablet (10 mg) by mouth 1  time if needed for migraine for up to 60 doses. May repeat in 2 hours if unresolved. Do not exceed 30 mg in 24 hours.  Medication Adjustments for Surgery: Do Not take on the morning of surgery     topiramate 25 mg tablet  Commonly known as: Topamax  Take 1 tablet in the morning and 2 tablets at bedtime.  Medication Adjustments for Surgery: Take/Use as prescribed     traMADol 50 mg tablet  Commonly known as: Ultram  Take 1 tablet (50 mg) by mouth 3 times a day as needed for severe pain (7 - 10) for up to 200 doses.  Medication Adjustments for Surgery: Take on the morning of surgery  Notes to patient: If needed     tretinoin 0.025 % cream  Commonly known as: Retin-A  Apply 1 Application topically once daily at bedtime. Start 1-2 nights per week 1-2 weeks, inc to every other night, then every night as tolerated  Medication Adjustments for Surgery: Take last dose 1 day (24 hours) before surgery     valACYclovir 500 mg tablet  Commonly known as: Valtrex  TAKE 1 TABLET (500 mg) BY MOUTH ONCE DAILY  Medication Adjustments for Surgery: Take/Use as prescribed     Vitamin D3 50 mcg (2,000 unit) capsule  Generic drug: cholecalciferol  TAKE 1 CAPSULE BY MOUTH ONCE A DAY  Medication Adjustments for Surgery: Do Not take on the morning of surgery                          **Concerning above medication instructions, if medication is normally taken at night, continue normal schedule.**  **DO NOT TAKE NIGHT PRIOR AND MORNING OF SURGERY**    CONTACT SURGEON'S OFFICE IF YOU DEVELOP:  * Fever = 100.4 F   * New respiratory symptoms (e.g. cough, shortness of breath, respiratory distress, sore throat)  * Recent loss of taste or smell  *Flu like symptoms such as headache, fatigue or gastrointestinal symptoms  * You develop any open sores, shingles, burning or painful urination   AND/OR:  * You no longer wish to have the surgery.  * Any other personal circumstances change that may lead to the need to cancel or defer this surgery.  *You were  admitted to any hospital within one week of your planned procedure.    SMOKING:  *Quitting smoking can make a huge difference to your health and recovery from surgery.    *If you need help with quitting, call 1-191-QUIT-NOW.    THE DAY OF SURGERY:  *Do not eat any food after midnight the night before surgery.   *You must drink 13.5 ounces of clear liquids (i.e. water, black coffee (no milk or cream), tea, apple juice or electrolyte drinks (gatorade)) 2 hours before your arrival time.  *You may chew gum until 2 hours before your surgery    SURGICAL TIME  *You will be contacted between 2 p.m. and 6 p.m. the business day before your surgery with your arrival time.  *If you haven't received a call by 6pm, call [phone].  *Scheduled surgery times may change and you will be notified if this occurs-check your personal voicemail for any updates.    ON THE MORNING OF SURGERY:  *Wear comfortable, loose fitting clothing.   *Do not use moisturizers, creams, lotions or perfume.  *All jewelry and valuables should be left at home.  *Prosthetic devices such as contact lenses, hearing aids, dentures, eyelash extensions, hairpins and body piercing must be removed before surgery.    BRING WITH YOU:  *Photo ID and insurance card  *Current list of medicines and allergies  *Pacemaker/Defibrillator/Heart stent cards  *CPAP machine and mask  *Slings/splints/crutches  *Copy of your complete Advanced Directive/DHPOA-if applicable  *Neurostimulator implant remote    PARKING AND ARRIVAL:  *Check in at the Main Entrance desk and let them know you are here for surgery.  *You will be directed to the 2nd floor surgical waiting area.    AFTER OUTPATIENT SURGERY:  *A responsible adult MUST accompany you at the time of discharge and stay with you for 24 hours after your surgery.  *You may NOT drive yourself home after surgery.  *You may use a taxi or ride sharing service (Lyft, Uber) to return home ONLY if you are accompanied by a friend or  family member.  *Instructions for resuming your medications will be provided by your surgeon.         Patient Information: Oral/Dental Rinse  **This is a prescription; pick it up at your preferred local pharmacy **  What is oral/dental rinse?   It is a mouthwash. It is a way of cleaning the mouth with a germ killing solution before your surgery.  The solution contains chlorhexidine, commonly known as CHG.   It is used inside the mouth to kill a bacteria known as Staphylococcus aureus.  Let your doctor know if you are allergic to Chlorhexidine.    Why do I need to use CHG oral/dental rinse?  The CHG oral/dental rinse helps to kill a bacteria in your mouth known a Staphylococcus aureus.     This reduces the risk of infection at the surgical site.      Using your CHG oral/dental rinse  STEPS:  Use your CHG oral/dental rinse after you brush your teeth the night before (at bedtime) and the morning of your surgery.  Follow all directions on your prescription label.    Use the cap on the container to measure 15ml (fill cap to fill line)  Swish (gargle if you can) the mouthwash in your mouth for at least 30 seconds, (do not to swallow) spit out  After you use your CHG rinse, do not rinse your mouth with water, drink or eat.  Please refer to prescription label for the appropriate time to resume oral intake  Dental rinse comes in one size bottle: 473ml ~16oz.  You will have leftover    rinse, discard after this use.    What side effects might I have using the CHG oral/dental rinse?  CHG rinse will stick to plaque on the teeth.  Brush and floss just before use.  Teeth brushing will help avoid staining of plaque during use.    Who should I contact if I have questions about the CHG oral/dental rinse?  Please call [facility], Preadmission Testing at [phone] if you have any questions      Home Preoperative Antibacterial Shower     What is a home preoperative antibacterial shower?  This shower is  a way of cleaning the skin with a germ killing soap before surgery.  The soap contains chlorhexidine, commonly known as CHG.  CHG is a soap for your skin with germ killing ability.  Let your doctor know if you are allergic to chlorhexidine.    Why do I need to take a preoperative antibacterial shower?  Skin is not sterile.  It is best to try to make your skin as free of germs as possible before surgery.  Proper cleansing with a germ killing soap before surgery can lower the number of germs on your skin.  This helps to reduce the risk of infection at the surgical site.  Following the instructions listed below will help you prepare your skin for surgery.      How do I use the CHG skin cleanser?  Steps:  Begin using your CHG soap five days before your scheduled surgery on ________________________.    Days 1-4 Shower before bed:  Wash your face and genitals with your normal soap and rinse.           2.    Apply the CHG soap to a clean wet washcloth.  Turn the water off or move away                From the water spray to avoid premature rinsing of the CHG soap as you are applying.     3.   Lather your entire body from the neck down.  Do not use on your face or genitals.  4. Pay special attention to the area(s) where your incision(s) will be located unless they are on your face.  Avoid scrubbing your skin too hard.  The important point is to have the CHG soap sit on your skin for 3 minutes.    When the 3 minutes are up, turn on the water and rinse the CHG soap off your body completely.   Pat yourself dry with a clean, freshly-laundered towel.  Dress in clean, freshly laundered night clothes.    Be sure to change bed sheets and blankets at least on the first night of CHG body wash use. May change linens every night of the above protocol for maximum benefit.   Day 5:  Last shower is the morning of surgery: Follow above Instructions.    NOTE:        *Keep CHG soap out of eyes and ear canals   *DO NOT wash with regular soap  on your body after you have used the CHG soap solution  *DO NOT apply powders, lotions, or perfume.  *Deodorant may be used days 1-4, BUT NOT the day of surgery    Who should I contact if I have any questions regarding the use of CHG soap?  Call the [facility], Preadmission Testing at [phone] if you have any questions.              Patient Information: Pre-Operative Infection Prevention Measures     Why did I have my nose, under my arms and groin swabbed?  The purpose of the swab is to identify Staphylococcus aureus inside your nose or on your skin.  The swab was sent to the laboratory for culture.  A positive swab/culture for Staphylococcus aureus is called colonization or carriage.      What is Staphylococcus aureus?  Staphylococcus aureus, also known as “staph”, is a germ found on the skin or in the nose of healthy people.  Sometimes Staphylococcus aureus can get into the body and cause an infection.  This can be minor (such as pimples, boils or other skin problems).  It might also be serious (such as blood infection, pneumonia or a surgical site infection).    What is Staphylococcus aureus colonization or carriage?  Colonization or carriage means that a person has the germ but is not sick from it.  These bacteria can be spread on the hands or when breathing or sneezing.    How is Staphylococcus aureus spread?  It is most often spread by close contact with a person or item that carries it.    What happens if my culture is positive for Staphylococcus aureus?  Your doctor/medical team will use this information to guide any antibiotic treatment which may be necessary.  Regardless of the culture results, we will clean the inside of your nose with a betadine swab just before you have your surgery.      Will I get an infection if I have Staphylococcus aureus in my nose or on my skin?  Anyone can get an infection with Staphylococcus aureus.  However, the best way to reduce your risk  of infection is to follow the instructions provided to you for the use of your CHG soap and dental rinse.        Who should I contact if I have any questions?  Call the [facility], Preadmission Testing at [phone] if you have any questions.            Preoperative Deep Breathing Exercises  Why it is important to do deep breathing exercises before my surgery?  Deep breathing exercises strengthen your breathing muscles.  This helps you to recover after your surgery and decreases the chance of breathing complications.  How are the deep breathing exercises done?  Sit straight with your back supported.  Breathe in deeply and slowly through your nose. Your lower rib cage should expand and your abdomen may move forward.  Hold that breath for 3 to 5 seconds.  Breathe out through pursed lips, slowly and completely.  Rest and repeat 10 times every hour while awake.  Rest longer if you become dizzy or lightheaded.

## 2025-04-14 NOTE — CPM/PAT H&P
[facility] Evaluation       Name: Nereida Conway (Nereida Conway)  /Age: 1965/60 y.o.         Date of Consult: 25    Referring Provider: Dr. Goodrich    Surgery, Date, and Length: Left Temporomandibular Joint Arthrotomy - Left  Left Temporomandibular Joint Arthroplasty - Left , 25, 310MIN    Nereida Conway is a 60 y.o. year-old female who presents to the [facility] for perioperative risk assessment prior to surgery.    Patient presents with a primary diagnosis of left TMJ joint disease. Pt notes degeneration of multiple joints.  Pt refers to left sided ear aches which are constant.  She is unable to open her mouth wide. She has tried injections and procedures on left jaw which have provided minimal pain relief.  She takes tramadol as needed for pain which does not help much.  She wishes to proceed with surgery as planned.     This note was created in part upon personal review of patient's medical records.      Patient is scheduled to have Left Temporomandibular Joint Arthrotomy - Left  Left Temporomandibular Joint Arthroplasty - Left      Pt denies any past history of anesthetic complications such as PONV, awareness, prolonged sedation, dental damage, aspiration, cardiac arrest, difficult intubation, difficult I.V. access or unexpected hospital admissions.  NO malignant hyperthermia and or pseudocholinesterase deficiency.  No history of blood transfusions     The patient is not a Religious and will accept blood and blood products if medically indicated.   Type and screen NOT sent.     Past Medical History:   Diagnosis Date    Alopecia     Anxiety and depression     Bronchitis     DDD (degenerative disc disease), lumbar     DJD (degenerative joint disease)     Encounter for immunization 2016    Need for Tdap vaccination    Genital herpes     GERD (gastroesophageal reflux disease)     Glaucoma (increased eye pressure)     pre-glaucoma    Hypercholesterolemia     Hypertension     Irritable  bowel syndrome     Jaw pain     Migraines     Neck pain     OA (osteoarthritis)     PAD (peripheral artery disease) (CMS-[facility])     Peripheral neuropathy     N/T feet/toes    Polyarthralgia     Pulmonary nodule     Raynaud's disease     Sinusitis     TMJ pain dysfunction syndrome     Vitamin D deficiency        Past Surgical History:   Procedure Laterality Date    BREAST BIOPSY Right 2024    Benign    CATARACT EXTRACTION W/  INTRAOCULAR LENS IMPLANT Right     OD 2023 +13.0D     SECTION, LOW TRANSVERSE      COLONOSCOPY          DILATION AND CURETTAGE OF UTERUS  10/09/2014    HYSTERECTOMY      LAPAROSCOPY DIAGNOSTIC / BIOPSY / ASPIRATION / LYSIS  10/09/2014    stomach Laparoscopy (Diagnostic)     OTHER SURGICAL HISTORY  2023    Right thumb joint replacement    ROTATOR CUFF REPAIR Right 2017    TONSILLECTOMY  10/09/2014    Tonsillectomy    TOTAL HIP ARTHROPLASTY Bilateral 2019       Patient  reports being sexually active and has had partner(s) who are male.    Family History   Problem Relation Name Age of Onset    Brain cancer Mother      Prostate cancer Father      Breast cancer Sister Chelly Weber 41        INVASIVE PLEOMORPHIC DUCTAL    Breast cancer Sister Jordyn Conway 49        DCIS    Colon cancer Father's Sister      Colon cancer Father's Brother       Social History     Socioeconomic History    Marital status:      Spouse name: Not on file    Number of children: 1    Years of education: Not on file    Highest education level: Not on file   Occupational History    Occupation: Sales   Tobacco Use    Smoking status: Former     Current packs/day: 0.00     Average packs/day: 1 pack/day for 10.0 years (10.0 ttl pk-yrs)     Types: Cigarettes     Start date:      Quit date: 2013     Years since quittin.2     Passive exposure: Never    Smokeless tobacco: Never   Vaping Use    Vaping status: Never Used   Substance and Sexual Activity    Alcohol use: Yes     Comment: RARE     Drug use: Never    Sexual activity: Yes     Partners: Male   Other Topics Concern    Not on file   Social History Narrative    Not on file     Social Drivers of Health     Financial Resource Strain: Not on file   Food Insecurity: Not on file   Transportation Needs: Not on file   Physical Activity: Not on file   Stress: Not on file   Social Connections: Not on file   Intimate Partner Violence: Not on file   Housing Stability: Not on file        Allergies   Allergen Reactions    Duloxetine Other and Hallucinations     severe diaphoresis    Indomethacin GI Upset and Other     severe gi upset    Prednisone Swelling    Sulfamethoxazole Rash       Current Outpatient Medications   Medication Instructions    amitriptyline (ELAVIL) 50 mg, oral, Nightly    ascorbic acid (VITAMIN C) 1,000 mg, Daily    atorvastatin (LIPITOR) 80 mg, oral, Daily    carboxymethylcellulose sodium (REFRESH TEARS OPHT) As needed    citalopram (CELEXA) 40 mg, oral, Daily    cyclobenzaprine (FLEXERIL) 5 mg, oral, Nightly    gabapentin (NEURONTIN) 100 mg, oral, Nightly, Take at bedtime.    lisinopriL-hydrochlorothiazide 10-12.5 mg tablet 1 tablet, oral, Daily    multivitamin tablet 1 tablet, Daily    propranolol LA (INDERAL LA) 120 mg, oral, Daily    rifAXIMin (XIFAXAN) 550 mg, oral, 3 times daily    rizatriptan (MAXALT) 10 mg, oral, Once as needed, May repeat in 2 hours if unresolved. Do not exceed 30 mg in 24 hours.    topiramate (Topamax) 25 mg tablet Take 1 tablet in the morning and 2 tablets at bedtime.    traMADol (ULTRAM) 50 mg, oral, 3 times daily PRN    tretinoin (Retin-A) 0.025 % cream 1 Application, Topical, Nightly, Start 1-2 nights per week 1-2 weeks, inc to every other night, then every night as tolerated    valACYclovir (VALTREX) 500 mg, oral, Daily    Vitamin D3 50 mcg (2,000 unit) capsule oral, Daily           PAT ROS:   Constitutional:    no fever   no chills   no unexpected weight change  Neuro/Psych:    no numbness   no weakness   no  light-headedness   no confusion  Eyes:    no discharge   no pain   no vision loss   no diplopia   no visual disturbance   use of corrective lenses  Ears:    no ear pain   no hearing loss   no tinnitus  Nose:    no nasal discharge   no sinus congestion   no epistaxis  Mouth:    no dental issues   no mouth pain   no oral bleeding   no mouth lesions  Throat:    no throat pain   no dysphagia  Neck:    neck pain (left side)   no neck stiffness  Cardio:    Functional 4 Mets. Patient denies SOB walking up 2 flights of stairs   Stretching exercises; cooking, cleaning, grocery shopping  gardening   no chest pain   no palpitations   no peripheral edema   no dyspnea   no RIBEIRO  Respiratory:    no cough   no wheezing   no hemoptysis   no shortness of breath  Endocrine:    no cold intolerance   no heat intolerance  GI:    no abdominal distention   no abdominal pain   no constipation   no diarrhea   no nausea   no vomiting   no blood in stool  :    no difficulty urinating   no dysuria   no oliguria  Musculoskeletal:    arthralgias (b/l thumbs/ hands; left jaw)   no myalgias   no decreased ROM  Hematologic:    does not bruise/bleed easily   no excessive bleeding   no history of blood transfusion   no blood clots  Skin:   no skin changes   no sores/wound   no rash      Physical Exam  Constitutional:       General: She is not in acute distress.     Appearance: Normal appearance. She is not ill-appearing, toxic-appearing or diaphoretic.   HENT:      Head: Normocephalic and atraumatic.      Nose: Nose normal. No congestion or rhinorrhea.      Mouth/Throat:      Mouth: Mucous membranes are moist.      Pharynx: No posterior oropharyngeal erythema.   Eyes:      Extraocular Movements: Extraocular movements intact.      Conjunctiva/sclera: Conjunctivae normal.   Cardiovascular:      Rate and Rhythm: Normal rate and regular rhythm.      Pulses: Normal pulses.      Heart sounds: Normal heart sounds. No murmur heard.     No friction rub. No  "gallop.   Pulmonary:      Effort: Pulmonary effort is normal. No respiratory distress.      Breath sounds: Normal breath sounds. No stridor. No wheezing, rhonchi or rales.   Abdominal:      General: Bowel sounds are normal. There is no distension.      Palpations: Abdomen is soft. There is no mass.      Tenderness: There is no abdominal tenderness. There is no guarding or rebound.      Hernia: No hernia is present.   Musculoskeletal:         General: Tenderness (left jaw pain; pain and decreased ROM with mouth opening) present. No swelling, deformity or signs of injury. Normal range of motion.      Cervical back: Normal range of motion and neck supple. No rigidity or tenderness.   Skin:     General: Skin is warm and dry.      Coloration: Skin is not jaundiced or pale.      Findings: No bruising, erythema or rash.   Neurological:      General: No focal deficit present.      Mental Status: She is alert and oriented to person, place, and time.      Cranial Nerves: No cranial nerve deficit.      Sensory: No sensory deficit.      Motor: No weakness.      Coordination: Coordination normal.   Psychiatric:         Mood and Affect: Mood normal.         Behavior: Behavior normal.          PAT AIRWAY:   Airway:      Limited mouth opening due to left jaw pain    Mallampati::  III    Neck ROM::  Full   No broken teeth, no dentures and no missing teeth            Visit Vitals  /73   Pulse 68   Temp 36.9 °C (98.4 °F) (Tympanic)   Resp 12   Ht 1.52 m (4' 11.84\")   Wt 60 kg (132 lb 4.4 oz)   LMP  (LMP Unknown)   SpO2 99%   BMI 25.97 kg/m²   OB Status Hysterectomy   Smoking Status Former   BSA 1.59 m²      LABS:  Lab Results   Component Value Date    WBC 10.0 04/14/2025    HGB 14.1 04/14/2025    HCT 41.6 04/14/2025     04/14/2025     04/14/2025      Lab Results   Component Value Date    GLUCOSE 91 04/14/2025    CALCIUM 11.0 (H) 04/14/2025     04/14/2025    K 4.4 04/14/2025    CO2 27 04/14/2025     (H) " 04/14/2025    BUN 13 04/14/2025    CREATININE 1.00 04/14/2025      Lab Results   Component Value Date    HGBA1C 5.0 04/14/2025        EKG 4/14/25  Sinus rhythm with fusion complexes  Nonspecific T wave abnormality  Abnormal EKG  Vent rate = 71 bpm    CT cardiac score 1/16/20  FINDINGS:  The score and distribution of calcium in the coronary arteries is as  follows:     LM 0,  LAD 45.39,  LCx 0,  RCA 14.24,     Total   59.63      Assessment and Plan:       60 y.o.  female  scheduled for Left Temporomandibular Joint Arthrotomy - Left  Left Temporomandibular Joint Arthroplasty - Left on 4/23/25 with Dr. Goodrich for  TMJ arthropathy.   Presents to CPM today for perioperative risk stratification and optimization      Cardiovascular:  Patient has no active cardiac symptoms.   Patient denies any chest pain, tightness, heaviness, pressure, radiating pain, palpitations, irregular heartbeats, lightheadedness, cough, congestion, shortness of breath, RIBEIRO, PND, near syncope, weight loss or gain.    METS: 9  RCRI: 0 points, 3.9%  risk for postoperative MACE     HTN - lisinopril/hydrochlorothiazide HOLD evening prior to surgery and morning of surgery   ; cont propranolol on dos   Encouraged lifestyle modifications, low-sodium diet, and increase activity as tolerated.  Monitor BP and follow up with managing physician for readings sustaining >140/90.    HLD - cont statin on dos     Pulmonary:  No pulmonary diagnosis, however patient is at increased risk of perioperative complications secondary to  age > 60, duration of surgery > 2 hours  Stop Bang score is 3 placing patient at intermediate risk for PREET  ARISCAT: 26-44 points, 13.3% risk of in-hospital postoperative pulmonary complication  PRODIGY: Moderate risk for opioid induced respiratory depression    **Pt provided with deep breathing exercises during PAT visit today**    Hematology:  Patient instructed to ambulate as soon as possible postoperatively to decrease thromboembolic  risk.   Initiate mechanical DVT prophylaxis as soon as possible and initiate chemical prophylaxis when deemed safe from a bleeding standpoint post surgery.     LABS: CBC, BMP, MRSA, A1c and EKG ordered. Lab results reviewed and unremarkable.     Followup: MRSA and A1c pending    Addendum 4/15/25:  A1c results reviewed and unremarkable    Caprini: 7    Risk assessment complete.  Patient is scheduled for a intermediate surgical risk procedure.        Preoperative medication instructions were provided and reviewed with the patient.  Any additional testing or evaluation was explained to the patient.  Nothing by mouth instructions were discussed and patient's questions were answered prior to conclusion to this encounter.  Patient verbalized understanding of preoperative instructions given in preadmission testing; discharge instructions available in EMR.    This note was dictated by a speech recognition.  Minor errors may have been detected in a speech recognition.

## 2025-04-15 DIAGNOSIS — E78.00 PURE HYPERCHOLESTEROLEMIA: ICD-10-CM

## 2025-04-15 DIAGNOSIS — I10 PRIMARY HYPERTENSION: ICD-10-CM

## 2025-04-15 LAB
ATRIAL RATE: 71 BPM
P AXIS: 48 DEGREES
P OFFSET: 210 MS
P ONSET: 152 MS
PR INTERVAL: 148 MS
Q ONSET: 226 MS
QRS COUNT: 11 BEATS
QRS DURATION: 82 MS
QT INTERVAL: 400 MS
QTC CALCULATION(BAZETT): 434 MS
QTC FREDERICIA: 423 MS
R AXIS: 53 DEGREES
T AXIS: 48 DEGREES
T OFFSET: 426 MS
VENTRICULAR RATE: 71 BPM

## 2025-04-15 RX ORDER — PROPRANOLOL HYDROCHLORIDE 120 MG/1
120 CAPSULE, EXTENDED RELEASE ORAL DAILY
Qty: 90 CAPSULE | Refills: 0 | Status: SHIPPED | OUTPATIENT
Start: 2025-04-15

## 2025-04-15 RX ORDER — ATORVASTATIN CALCIUM 80 MG/1
80 TABLET, FILM COATED ORAL DAILY
Qty: 90 TABLET | Refills: 0 | Status: SHIPPED | OUTPATIENT
Start: 2025-04-15

## 2025-04-16 LAB — STAPHYLOCOCCUS SPEC CULT: NORMAL

## 2025-04-21 DIAGNOSIS — M19.049 CMC ARTHRITIS: ICD-10-CM

## 2025-04-21 RX ORDER — TRAMADOL HYDROCHLORIDE 50 MG/1
50 TABLET ORAL 3 TIMES DAILY PRN
Qty: 50 TABLET | Refills: 3 | Status: SHIPPED | OUTPATIENT
Start: 2025-04-21

## 2025-04-28 ENCOUNTER — TELEMEDICINE (OUTPATIENT)
Dept: PRIMARY CARE | Facility: CLINIC | Age: 60
End: 2025-04-28
Payer: COMMERCIAL

## 2025-04-28 DIAGNOSIS — J01.00 ACUTE NON-RECURRENT MAXILLARY SINUSITIS: Primary | ICD-10-CM

## 2025-04-28 PROCEDURE — 99212 OFFICE O/P EST SF 10 MIN: CPT | Performed by: PHYSICIAN ASSISTANT

## 2025-04-28 PROCEDURE — 1036F TOBACCO NON-USER: CPT | Performed by: PHYSICIAN ASSISTANT

## 2025-04-28 RX ORDER — AMOXICILLIN AND CLAVULANATE POTASSIUM 500; 125 MG/1; MG/1
500 TABLET, FILM COATED ORAL 2 TIMES DAILY
Qty: 20 TABLET | Refills: 0 | Status: SHIPPED | OUTPATIENT
Start: 2025-04-28 | End: 2025-05-08

## 2025-04-28 ASSESSMENT — ENCOUNTER SYMPTOMS
SINUS PAIN: 1
FREQUENCY: 0
CONSTIPATION: 0
COUGH: 1
MYALGIAS: 1
SHORTNESS OF BREATH: 0
NERVOUS/ANXIOUS: 0
COUGH: 0
PALPITATIONS: 0
RHINORRHEA: 1
FEVER: 0
ARTHRALGIAS: 0
WHEEZING: 0
DIFFICULTY URINATING: 0
EYE PAIN: 0
TREMORS: 0
COLOR CHANGE: 0
HEADACHES: 1
CONFUSION: 0
DIARRHEA: 0
ANAL BLEEDING: 0
POLYDIPSIA: 0
APPETITE CHANGE: 0
FACIAL SWELLING: 0
EYE DISCHARGE: 0
DIZZINESS: 0
CHOKING: 0
NAUSEA: 0
SLEEP DISTURBANCE: 0
ABDOMINAL DISTENTION: 0
CHILLS: 1
CHILLS: 0
CHEST TIGHTNESS: 0
SINUS PRESSURE: 1
MYALGIAS: 0
HEMATURIA: 0
VOMITING: 0
ABDOMINAL PAIN: 0
JOINT SWELLING: 0
POLYPHAGIA: 0
FATIGUE: 0
HEADACHES: 0
SORE THROAT: 0
WEAKNESS: 0
NUMBNESS: 0

## 2025-04-28 NOTE — PROGRESS NOTES
Subjective   Patient ID: Nereida Conway is a 60 y.o. 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    Virtual or Telephone Consent    An interactive audio and video telecommunication system which permits real time communications between the patient (at the originating site) and provider (at the distant site) was utilized to provide this telehealth service.   Verbal consent was requested and obtained from Nereida Conway on this date, 04/28/25 for a telehealth visit.     Cough  This is a new problem. The current episode started in the past 7 days. The problem has been gradually worsening. The problem occurs hourly. The cough is Non-productive and productive of purulent sputum. Associated symptoms include ear congestion, nasal congestion, postnasal drip and rhinorrhea. Pertinent negatives include no chest pain, chills, ear pain, fever, headaches, myalgias, rash, sore throat, shortness of breath or wheezing. The symptoms are aggravated by lying down.   The patient is complaining of chills, headaches, facial pain, sinus pressure, tenderness, green nasal discharge and cough which she has been having for couple of days . She takes Robitussin and Mucinex with not much relief. She denies sore throat, earaches, SOB, wheezing, fever, or chills.     Review of Systems   Constitutional:  Negative for appetite change, chills, fatigue and fever.   HENT:  Positive for congestion, postnasal drip, rhinorrhea, sinus pressure and sinus pain. Negative for ear pain, facial swelling, hearing loss, nosebleeds, sore throat and tinnitus.    Eyes:  Negative for pain, discharge and visual disturbance.   Respiratory:  Negative for cough, choking, chest tightness, shortness of breath and wheezing.    Cardiovascular:  Negative for chest pain, palpitations and leg  swelling.   Gastrointestinal:  Negative for abdominal distention, abdominal pain, anal bleeding, constipation, diarrhea, nausea and vomiting.   Endocrine: Negative for cold intolerance, heat intolerance, polydipsia, polyphagia and polyuria.   Genitourinary:  Negative for difficulty urinating, frequency, hematuria and urgency.   Musculoskeletal:  Negative for arthralgias, gait problem, joint swelling and myalgias.   Skin:  Negative for color change and rash.   Neurological:  Negative for dizziness, tremors, syncope, weakness, numbness and headaches.   Psychiatric/Behavioral:  Negative for behavioral problems, confusion, sleep disturbance and suicidal ideas. The patient is not nervous/anxious.        Objective   LMP  (LMP Unknown)     Physical Exam    Assessment/Plan   Acute sinusitis  Start Augmentin 500-125 mg twice daily  Continue  Mucinex D    Left TMJ  Recommended TMJ replacement  Follow up with oral surgery Dr. Delgado     HTN  Well-controlled  Continue lisinopril-HCTZ 10-12.5 mg daily  If low, will discontinue hydrochlorothiazide and continue lisinopril  No added salt diet, do not add salt to your food  Try to exercise every other day for 30 minutes    Arthralgia, osteoarthritis of multiple joints   Low back pain, left hip pain, right TMJ, right and left thumb CMC joint pains  s/p hip replacement   S/p right CMC joint arthroplasty   Right TMJ surgery will be scheduled  Failed Neurontin, tramadol, Cymbalta, and indomethacin  Currently on tramadol 50 mg 3 times daily given by Dr. Munguia  Recommended OT  Follow-up with orthopedic surgery Dr. Munguia    IBS with diarrhea  Stable  Xifaxan as needed  Take probiotics daily  Colonoscopy February 2024, repeat in 7 years  Follow-up with gastroenterology     Peripheral neuropathy  Started on Gabapentin 100 mg hs   Follow up with neurology Rorick     leg spasms   Improved  Off of magnesium 400 mg  Drink plenty of fluids     Former smoker  quit in 2015  4 mm pulmonary  nodule right lung  Repeat CT chest 3/18/2023    Dyslipidemia  Continue Atorvastatin 80 mg at bed time  Continue with the low fat, low cholesterol diet  I recommend Mediterranean diet, which include fish, chicken, vegetables and olive oil  Exercise daily for 30 minutes at least 3 times a week     Migraines  Stable  Continue Amitriptyline 50 mg at bed time   Continue Propranolol 120 mg once daily  Maxalt as needed  Follow-up with neurology     Depression and anxiety  stable  Continue Amitriptyline 50 mg t bed time  Take Citalopram 40 mg once daily     Herpes simplex 2   Take Valacyclovir as needed for flare ups.     Abnormal mammogram 7/5/2024  Biopsy benign    Vitamin D deficiency  Continue vitamin D 2000 units daily    There is no height or weight on file to calculate BMI.  Patient has lost 13 pounds    Health maintenance  Mammogram 7/5/2024  Colonoscopy 2024, repeat in 7 years    Follow-up in 3 months

## 2025-05-05 ENCOUNTER — APPOINTMENT (OUTPATIENT)
Dept: ORTHOPEDIC SURGERY | Facility: CLINIC | Age: 60
End: 2025-05-05
Payer: COMMERCIAL

## 2025-05-05 DIAGNOSIS — M25.519 SHOULDER PAIN, UNSPECIFIED CHRONICITY, UNSPECIFIED LATERALITY: Primary | ICD-10-CM

## 2025-05-05 NOTE — PROGRESS NOTES
Subjective   Patient ID: Nereida Conway is a 60 y.o. female    Chief Complaint: No chief complaint on file.       Last Surgery: Phacoemulsification Cataract with Insertion Intraocular Lens - Right  Date of Last Surgery: 1/25/2024    HPI  Nereida Conway is a 60 y.o. *** hand dominant female presenting for ***      Objective   Patient is a well-developed, well-nourished female  in no acute distress.  Breathes with normal chest rises.  Pupils are round and symmetric today.  Awake, alert, and oriented x3.      Examination of the left shoulder today reveals the skin to be intact. There is no sign of any atrophy, lesions, or abrasions. There is no pain to palpation of the bony prominences. Cervical lymphadenopathy examined, and this was negative. Patient had 5 out of 5 wrist flexion, extension, and thumb extension bilaterally. Sensation was intact to light touch to median, ulnar, radial axillary, and musculocutaneous nerves bilaterally. Positive radial pulse bilaterally. ***Provocative maneuvers are negative today. Range of motion of the left shoulder revealed 0-170° of forward elevation, 0-60° of external rotation, and internal rotation was to T-12.     Examination of the right shoulder today reveals the skin to be intact. There is no sign of any atrophy, lesions, or abrasions. There is no pain to palpation of the bony prominences. Cervical lymphadenopathy examined, and this was negative. Patient had 5 out of 5 wrist flexion, extension, and thumb extension, bilaterally. Sensation was intact to light touch to median, ulnar, radial, axillary, and musculocutaneous nerves bilaterally. Positive radial pulse bilaterally. ***Provocative maneuvers are negative today. Range of motion of the right shoulder revealed 0-170° of forward elevation, 0-60° of external rotation, and internal rotation up to T-12.    Imaging:    Assessment/Plan   Shoulder pain, unspecified chronicity, unspecified laterality  Patient with ***    Orders Placed This  Encounter    XR shoulder right 2+ views    XR shoulder left 2+ views         Follow up ***      Scribe Attestation  By signing my name below, I, Cabrera Estevez   attest that this documentation has been prepared under the direction and in the presence of Justyn Lazar MD.

## 2025-05-06 DIAGNOSIS — F32.5 MAJOR DEPRESSIVE DISORDER WITH SINGLE EPISODE, IN FULL REMISSION (CMS-HCC): ICD-10-CM

## 2025-05-06 RX ORDER — CITALOPRAM 40 MG/1
40 TABLET, FILM COATED ORAL DAILY
Qty: 90 TABLET | Refills: 3 | Status: SHIPPED | OUTPATIENT
Start: 2025-05-06

## 2025-05-07 ENCOUNTER — APPOINTMENT (OUTPATIENT)
Dept: NEUROLOGY | Facility: CLINIC | Age: 60
End: 2025-05-07
Payer: COMMERCIAL

## 2025-05-12 DIAGNOSIS — G43.009 MIGRAINE WITHOUT AURA AND WITHOUT STATUS MIGRAINOSUS, NOT INTRACTABLE: ICD-10-CM

## 2025-05-12 RX ORDER — TOPIRAMATE 25 MG/1
TABLET ORAL
Qty: 90 TABLET | Refills: 0 | Status: SHIPPED | OUTPATIENT
Start: 2025-05-12

## 2025-05-21 DIAGNOSIS — M19.049 CMC ARTHRITIS: ICD-10-CM

## 2025-05-21 RX ORDER — TRAMADOL HYDROCHLORIDE 50 MG/1
50 TABLET, FILM COATED ORAL 3 TIMES DAILY PRN
Qty: 21 TABLET | Refills: 0 | Status: SHIPPED | OUTPATIENT
Start: 2025-05-21 | End: 2025-05-28

## 2025-05-23 ENCOUNTER — TELEPHONE (OUTPATIENT)
Dept: PREADMISSION TESTING | Facility: HOSPITAL | Age: 60
End: 2025-05-23
Payer: COMMERCIAL

## 2025-05-23 ENCOUNTER — APPOINTMENT (OUTPATIENT)
Dept: PREADMISSION TESTING | Facility: HOSPITAL | Age: 60
End: 2025-05-23
Payer: COMMERCIAL

## 2025-05-27 ENCOUNTER — ANESTHESIA EVENT (OUTPATIENT)
Dept: OPERATING ROOM | Facility: HOSPITAL | Age: 60
End: 2025-05-27
Payer: COMMERCIAL

## 2025-05-27 DIAGNOSIS — M19.049 CMC ARTHRITIS: ICD-10-CM

## 2025-05-27 RX ORDER — TRAMADOL HYDROCHLORIDE 50 MG/1
50 TABLET, FILM COATED ORAL 3 TIMES DAILY PRN
Qty: 50 TABLET | Refills: 3 | Status: SHIPPED | OUTPATIENT
Start: 2025-05-27 | End: 2025-05-30 | Stop reason: HOSPADM

## 2025-05-28 ENCOUNTER — APPOINTMENT (OUTPATIENT)
Dept: RADIOLOGY | Facility: HOSPITAL | Age: 60
End: 2025-05-28
Payer: COMMERCIAL

## 2025-05-28 ENCOUNTER — HOSPITAL ENCOUNTER (OUTPATIENT)
Facility: HOSPITAL | Age: 60
LOS: 1 days | Discharge: HOME | End: 2025-05-30
Attending: DENTIST | Admitting: DENTIST
Payer: COMMERCIAL

## 2025-05-28 ENCOUNTER — ANESTHESIA (OUTPATIENT)
Dept: OPERATING ROOM | Facility: HOSPITAL | Age: 60
End: 2025-05-28
Payer: COMMERCIAL

## 2025-05-28 DIAGNOSIS — M26.622 ARTHRALGIA OF LEFT TEMPOROMANDIBULAR JOINT: Primary | ICD-10-CM

## 2025-05-28 DIAGNOSIS — M26.632 ARTICULAR DISC DISORDER OF LEFT TEMPOROMANDIBULAR JOINT: ICD-10-CM

## 2025-05-28 PROCEDURE — 3700000002 HC GENERAL ANESTHESIA TIME - EACH INCREMENTAL 1 MINUTE: Performed by: DENTIST

## 2025-05-28 PROCEDURE — C1889 IMPLANT/INSERT DEVICE, NOC: HCPCS | Performed by: DENTIST

## 2025-05-28 PROCEDURE — 2500000004 HC RX 250 GENERAL PHARMACY W/ HCPCS (ALT 636 FOR OP/ED): Performed by: ANESTHESIOLOGY

## 2025-05-28 PROCEDURE — 7100000011 HC EXTENDED STAY RECOVERY HOURLY - NURSING UNIT

## 2025-05-28 PROCEDURE — 70486 CT MAXILLOFACIAL W/O DYE: CPT

## 2025-05-28 PROCEDURE — A4649 SURGICAL SUPPLIES: HCPCS | Performed by: DENTIST

## 2025-05-28 PROCEDURE — 2500000001 HC RX 250 WO HCPCS SELF ADMINISTERED DRUGS (ALT 637 FOR MEDICARE OP)

## 2025-05-28 PROCEDURE — 2500000001 HC RX 250 WO HCPCS SELF ADMINISTERED DRUGS (ALT 637 FOR MEDICARE OP): Performed by: ANESTHESIOLOGIST ASSISTANT

## 2025-05-28 PROCEDURE — 88311 DECALCIFY TISSUE: CPT | Performed by: STUDENT IN AN ORGANIZED HEALTH CARE EDUCATION/TRAINING PROGRAM

## 2025-05-28 PROCEDURE — 2500000005 HC RX 250 GENERAL PHARMACY W/O HCPCS: Performed by: ANESTHESIOLOGIST ASSISTANT

## 2025-05-28 PROCEDURE — 88304 TISSUE EXAM BY PATHOLOGIST: CPT | Performed by: STUDENT IN AN ORGANIZED HEALTH CARE EDUCATION/TRAINING PROGRAM

## 2025-05-28 PROCEDURE — 2500000005 HC RX 250 GENERAL PHARMACY W/O HCPCS

## 2025-05-28 PROCEDURE — 2500000004 HC RX 250 GENERAL PHARMACY W/ HCPCS (ALT 636 FOR OP/ED)

## 2025-05-28 PROCEDURE — 2500000004 HC RX 250 GENERAL PHARMACY W/ HCPCS (ALT 636 FOR OP/ED): Mod: JZ

## 2025-05-28 PROCEDURE — 2780000003 HC OR 278 NO HCPCS: Performed by: DENTIST

## 2025-05-28 PROCEDURE — 2500000004 HC RX 250 GENERAL PHARMACY W/ HCPCS (ALT 636 FOR OP/ED): Mod: JZ | Performed by: ANESTHESIOLOGIST ASSISTANT

## 2025-05-28 PROCEDURE — 2500000005 HC RX 250 GENERAL PHARMACY W/O HCPCS: Performed by: DENTIST

## 2025-05-28 PROCEDURE — 70486 CT MAXILLOFACIAL W/O DYE: CPT | Performed by: STUDENT IN AN ORGANIZED HEALTH CARE EDUCATION/TRAINING PROGRAM

## 2025-05-28 PROCEDURE — 3600000005 HC OR TIME - INITIAL BASE CHARGE - PROCEDURE LEVEL FIVE: Performed by: DENTIST

## 2025-05-28 PROCEDURE — C1776 JOINT DEVICE (IMPLANTABLE): HCPCS | Performed by: DENTIST

## 2025-05-28 PROCEDURE — 7100000001 HC RECOVERY ROOM TIME - INITIAL BASE CHARGE: Performed by: DENTIST

## 2025-05-28 PROCEDURE — 7100000002 HC RECOVERY ROOM TIME - EACH INCREMENTAL 1 MINUTE: Performed by: DENTIST

## 2025-05-28 PROCEDURE — 2500000005 HC RX 250 GENERAL PHARMACY W/O HCPCS: Performed by: ANESTHESIOLOGY

## 2025-05-28 PROCEDURE — 2500000004 HC RX 250 GENERAL PHARMACY W/ HCPCS (ALT 636 FOR OP/ED): Performed by: DENTIST

## 2025-05-28 PROCEDURE — A21240: Performed by: ANESTHESIOLOGY

## 2025-05-28 PROCEDURE — C1713 ANCHOR/SCREW BN/BN,TIS/BN: HCPCS | Performed by: DENTIST

## 2025-05-28 PROCEDURE — 88304 TISSUE EXAM BY PATHOLOGIST: CPT | Mod: TC,AHULAB | Performed by: DENTIST

## 2025-05-28 PROCEDURE — 2500000004 HC RX 250 GENERAL PHARMACY W/ HCPCS (ALT 636 FOR OP/ED): Mod: JZ | Performed by: STUDENT IN AN ORGANIZED HEALTH CARE EDUCATION/TRAINING PROGRAM

## 2025-05-28 PROCEDURE — 96372 THER/PROPH/DIAG INJ SC/IM: CPT

## 2025-05-28 PROCEDURE — A21240: Performed by: ANESTHESIOLOGIST ASSISTANT

## 2025-05-28 PROCEDURE — 3700000001 HC GENERAL ANESTHESIA TIME - INITIAL BASE CHARGE: Performed by: DENTIST

## 2025-05-28 PROCEDURE — 3600000010 HC OR TIME - EACH INCREMENTAL 1 MINUTE - PROCEDURE LEVEL FIVE: Performed by: DENTIST

## 2025-05-28 PROCEDURE — 2720000007 HC OR 272 NO HCPCS: Performed by: DENTIST

## 2025-05-28 DEVICE — LEFT FOSSA COMPONENT, SMALL
Type: IMPLANTABLE DEVICE | Site: FACE | Status: FUNCTIONAL
Brand: TMJ SYSTEM

## 2025-05-28 DEVICE — IMPLANTABLE DEVICE: Type: IMPLANTABLE DEVICE | Site: FACE | Status: FUNCTIONAL

## 2025-05-28 DEVICE — 45 MM LEFT STANDARD TITANIUM MANDIBULAR COMPONENT
Type: IMPLANTABLE DEVICE | Site: FACE | Status: FUNCTIONAL
Brand: TMJ SYSTEM

## 2025-05-28 RX ORDER — LIDOCAINE HYDROCHLORIDE 20 MG/ML
INJECTION, SOLUTION INFILTRATION; PERINEURAL AS NEEDED
Status: DISCONTINUED | OUTPATIENT
Start: 2025-05-28 | End: 2025-05-28

## 2025-05-28 RX ORDER — LIDOCAINE HYDROCHLORIDE 10 MG/ML
0.1 INJECTION, SOLUTION EPIDURAL; INFILTRATION; INTRACAUDAL; PERINEURAL ONCE
Status: DISCONTINUED | OUTPATIENT
Start: 2025-05-28 | End: 2025-05-28 | Stop reason: HOSPADM

## 2025-05-28 RX ORDER — KETOROLAC TROMETHAMINE 30 MG/ML
INJECTION, SOLUTION INTRAMUSCULAR; INTRAVENOUS AS NEEDED
Status: DISCONTINUED | OUTPATIENT
Start: 2025-05-28 | End: 2025-05-28

## 2025-05-28 RX ORDER — ACETAMINOPHEN 325 MG/1
650 TABLET ORAL EVERY 6 HOURS SCHEDULED
Status: DISCONTINUED | OUTPATIENT
Start: 2025-05-28 | End: 2025-05-29

## 2025-05-28 RX ORDER — SODIUM CHLORIDE, SODIUM LACTATE, POTASSIUM CHLORIDE, CALCIUM CHLORIDE 600; 310; 30; 20 MG/100ML; MG/100ML; MG/100ML; MG/100ML
75 INJECTION, SOLUTION INTRAVENOUS CONTINUOUS
Status: ACTIVE | OUTPATIENT
Start: 2025-05-28 | End: 2025-05-29

## 2025-05-28 RX ORDER — SODIUM CHLORIDE, SODIUM LACTATE, POTASSIUM CHLORIDE, CALCIUM CHLORIDE 600; 310; 30; 20 MG/100ML; MG/100ML; MG/100ML; MG/100ML
INJECTION, SOLUTION INTRAVENOUS CONTINUOUS PRN
Status: DISCONTINUED | OUTPATIENT
Start: 2025-05-28 | End: 2025-05-28

## 2025-05-28 RX ORDER — ONDANSETRON HYDROCHLORIDE 2 MG/ML
INJECTION, SOLUTION INTRAVENOUS AS NEEDED
Status: DISCONTINUED | OUTPATIENT
Start: 2025-05-28 | End: 2025-05-28

## 2025-05-28 RX ORDER — CYCLOBENZAPRINE HCL 5 MG
5 TABLET ORAL 3 TIMES DAILY
Status: DISCONTINUED | OUTPATIENT
Start: 2025-05-28 | End: 2025-05-30 | Stop reason: HOSPADM

## 2025-05-28 RX ORDER — OXYCODONE HCL 5 MG/5 ML
10 SOLUTION, ORAL ORAL EVERY 6 HOURS PRN
Status: DISCONTINUED | OUTPATIENT
Start: 2025-05-28 | End: 2025-05-28

## 2025-05-28 RX ORDER — OXYCODONE HYDROCHLORIDE 5 MG/1
10 TABLET ORAL EVERY 4 HOURS PRN
Status: DISCONTINUED | OUTPATIENT
Start: 2025-05-28 | End: 2025-05-28

## 2025-05-28 RX ORDER — OXYCODONE HYDROCHLORIDE 5 MG/1
5 TABLET ORAL EVERY 6 HOURS PRN
Status: CANCELLED | OUTPATIENT
Start: 2025-05-28

## 2025-05-28 RX ORDER — PROPOFOL 10 MG/ML
INJECTION, EMULSION INTRAVENOUS AS NEEDED
Status: DISCONTINUED | OUTPATIENT
Start: 2025-05-28 | End: 2025-05-28

## 2025-05-28 RX ORDER — MIDAZOLAM HYDROCHLORIDE 1 MG/ML
INJECTION INTRAMUSCULAR; INTRAVENOUS AS NEEDED
Status: DISCONTINUED | OUTPATIENT
Start: 2025-05-28 | End: 2025-05-28

## 2025-05-28 RX ORDER — CEFAZOLIN 1 G/1
INJECTION, POWDER, FOR SOLUTION INTRAVENOUS AS NEEDED
Status: DISCONTINUED | OUTPATIENT
Start: 2025-05-28 | End: 2025-05-28

## 2025-05-28 RX ORDER — TOBRAMYCIN AND DEXAMETHASONE 3; 1 MG/ML; MG/ML
1 SUSPENSION/ DROPS OPHTHALMIC EVERY 8 HOURS
Status: DISCONTINUED | OUTPATIENT
Start: 2025-05-28 | End: 2025-05-30 | Stop reason: HOSPADM

## 2025-05-28 RX ORDER — ROCURONIUM BROMIDE 10 MG/ML
INJECTION, SOLUTION INTRAVENOUS AS NEEDED
Status: DISCONTINUED | OUTPATIENT
Start: 2025-05-28 | End: 2025-05-28

## 2025-05-28 RX ORDER — LIDOCAINE HYDROCHLORIDE 40 MG/ML
SOLUTION TOPICAL AS NEEDED
Status: DISCONTINUED | OUTPATIENT
Start: 2025-05-28 | End: 2025-05-28

## 2025-05-28 RX ORDER — DEXMEDETOMIDINE IN 0.9 % NACL 20 MCG/5ML
SYRINGE (ML) INTRAVENOUS AS NEEDED
Status: DISCONTINUED | OUTPATIENT
Start: 2025-05-28 | End: 2025-05-28

## 2025-05-28 RX ORDER — ACETAMINOPHEN 160 MG/5ML
650 SOLUTION ORAL EVERY 6 HOURS SCHEDULED
Status: DISCONTINUED | OUTPATIENT
Start: 2025-05-28 | End: 2025-05-29

## 2025-05-28 RX ORDER — SODIUM CHLORIDE 0.9 G/100ML
INJECTION, SOLUTION IRRIGATION AS NEEDED
Status: DISCONTINUED | OUTPATIENT
Start: 2025-05-28 | End: 2025-05-28 | Stop reason: HOSPADM

## 2025-05-28 RX ORDER — OXYCODONE HCL 5 MG/5 ML
10 SOLUTION, ORAL ORAL EVERY 4 HOURS PRN
Status: DISCONTINUED | OUTPATIENT
Start: 2025-05-28 | End: 2025-05-30 | Stop reason: HOSPADM

## 2025-05-28 RX ORDER — OXYCODONE HYDROCHLORIDE 5 MG/1
5 TABLET ORAL EVERY 6 HOURS PRN
Status: DISCONTINUED | OUTPATIENT
Start: 2025-05-28 | End: 2025-05-28

## 2025-05-28 RX ORDER — ENOXAPARIN SODIUM 100 MG/ML
40 INJECTION SUBCUTANEOUS EVERY 24 HOURS
Status: DISCONTINUED | OUTPATIENT
Start: 2025-05-28 | End: 2025-05-30 | Stop reason: HOSPADM

## 2025-05-28 RX ORDER — LABETALOL HYDROCHLORIDE 5 MG/ML
5 INJECTION, SOLUTION INTRAVENOUS ONCE AS NEEDED
Status: DISCONTINUED | OUTPATIENT
Start: 2025-05-28 | End: 2025-05-28 | Stop reason: HOSPADM

## 2025-05-28 RX ORDER — OXYCODONE HCL 5 MG/5 ML
5 SOLUTION, ORAL ORAL EVERY 6 HOURS PRN
Status: DISCONTINUED | OUTPATIENT
Start: 2025-05-28 | End: 2025-05-29

## 2025-05-28 RX ORDER — ALBUTEROL SULFATE 0.83 MG/ML
2.5 SOLUTION RESPIRATORY (INHALATION) ONCE AS NEEDED
Status: DISCONTINUED | OUTPATIENT
Start: 2025-05-28 | End: 2025-05-28 | Stop reason: HOSPADM

## 2025-05-28 RX ORDER — OXYMETAZOLINE HCL 0.05 %
SPRAY, NON-AEROSOL (ML) NASAL AS NEEDED
Status: DISCONTINUED | OUTPATIENT
Start: 2025-05-28 | End: 2025-05-28

## 2025-05-28 RX ORDER — HYDRALAZINE HYDROCHLORIDE 20 MG/ML
5 INJECTION INTRAMUSCULAR; INTRAVENOUS EVERY 30 MIN PRN
Status: DISCONTINUED | OUTPATIENT
Start: 2025-05-28 | End: 2025-05-28 | Stop reason: HOSPADM

## 2025-05-28 RX ORDER — OXYCODONE HYDROCHLORIDE 5 MG/1
5 TABLET ORAL EVERY 4 HOURS PRN
Status: DISCONTINUED | OUTPATIENT
Start: 2025-05-28 | End: 2025-05-28 | Stop reason: HOSPADM

## 2025-05-28 RX ORDER — HYDROMORPHONE HYDROCHLORIDE 0.2 MG/ML
0.2 INJECTION INTRAMUSCULAR; INTRAVENOUS; SUBCUTANEOUS ONCE
Status: COMPLETED | OUTPATIENT
Start: 2025-05-28 | End: 2025-05-28

## 2025-05-28 RX ORDER — SUCCINYLCHOLINE CHLORIDE 20 MG/ML
INJECTION INTRAMUSCULAR; INTRAVENOUS AS NEEDED
Status: DISCONTINUED | OUTPATIENT
Start: 2025-05-28 | End: 2025-05-28

## 2025-05-28 RX ORDER — HYDROMORPHONE HYDROCHLORIDE 0.2 MG/ML
0.2 INJECTION INTRAMUSCULAR; INTRAVENOUS; SUBCUTANEOUS EVERY 2 HOUR PRN
Status: COMPLETED | OUTPATIENT
Start: 2025-05-28 | End: 2025-05-29

## 2025-05-28 RX ORDER — HYDROMORPHONE HYDROCHLORIDE 1 MG/ML
INJECTION, SOLUTION INTRAMUSCULAR; INTRAVENOUS; SUBCUTANEOUS AS NEEDED
Status: DISCONTINUED | OUTPATIENT
Start: 2025-05-28 | End: 2025-05-28

## 2025-05-28 RX ORDER — ACETAMINOPHEN 325 MG/1
650 TABLET ORAL EVERY 4 HOURS PRN
Status: DISCONTINUED | OUTPATIENT
Start: 2025-05-28 | End: 2025-05-28 | Stop reason: SDUPTHER

## 2025-05-28 RX ORDER — OXYCODONE HCL 5 MG/5 ML
5 SOLUTION, ORAL ORAL EVERY 6 HOURS PRN
Status: DISCONTINUED | OUTPATIENT
Start: 2025-05-28 | End: 2025-05-28

## 2025-05-28 RX ORDER — LIDOCAINE HYDROCHLORIDE AND EPINEPHRINE 10; 10 UG/ML; MG/ML
INJECTION, SOLUTION INFILTRATION; PERINEURAL AS NEEDED
Status: DISCONTINUED | OUTPATIENT
Start: 2025-05-28 | End: 2025-05-28 | Stop reason: HOSPADM

## 2025-05-28 RX ORDER — FENTANYL CITRATE 50 UG/ML
INJECTION, SOLUTION INTRAMUSCULAR; INTRAVENOUS AS NEEDED
Status: DISCONTINUED | OUTPATIENT
Start: 2025-05-28 | End: 2025-05-28

## 2025-05-28 RX ORDER — KETOROLAC TROMETHAMINE 30 MG/ML
15 INJECTION, SOLUTION INTRAMUSCULAR; INTRAVENOUS EVERY 6 HOURS SCHEDULED
Status: COMPLETED | OUTPATIENT
Start: 2025-05-28 | End: 2025-05-29

## 2025-05-28 RX ORDER — ONDANSETRON HYDROCHLORIDE 2 MG/ML
4 INJECTION, SOLUTION INTRAVENOUS ONCE AS NEEDED
Status: DISCONTINUED | OUTPATIENT
Start: 2025-05-28 | End: 2025-05-28 | Stop reason: HOSPADM

## 2025-05-28 RX ADMIN — ACETAMINOPHEN 650 MG: 650 SOLUTION ORAL at 18:04

## 2025-05-28 RX ADMIN — PROPOFOL 75 MCG/KG/MIN: 10 INJECTION, EMULSION INTRAVENOUS at 07:50

## 2025-05-28 RX ADMIN — KETOROLAC TROMETHAMINE 15 MG: 30 INJECTION, SOLUTION INTRAMUSCULAR at 20:24

## 2025-05-28 RX ADMIN — PROPOFOL 200 MG: 10 INJECTION, EMULSION INTRAVENOUS at 07:44

## 2025-05-28 RX ADMIN — Medication 8 MCG: at 07:44

## 2025-05-28 RX ADMIN — SUCCINYLCHOLINE CHLORIDE 60 MG: 20 INJECTION, SOLUTION INTRAMUSCULAR; INTRAVENOUS at 07:44

## 2025-05-28 RX ADMIN — Medication 30 MCG: at 08:11

## 2025-05-28 RX ADMIN — LIDOCAINE HYDROCHLORIDE 100 MG: 20 INJECTION, SOLUTION INFILTRATION; PERINEURAL at 07:44

## 2025-05-28 RX ADMIN — HYDROMORPHONE HYDROCHLORIDE 0.5 MG: 1 INJECTION, SOLUTION INTRAMUSCULAR; INTRAVENOUS; SUBCUTANEOUS at 12:24

## 2025-05-28 RX ADMIN — ROCURONIUM 10 MG: 100 INJECTION, SOLUTION INTRAVENOUS at 10:58

## 2025-05-28 RX ADMIN — Medication 30 MCG: at 08:55

## 2025-05-28 RX ADMIN — HYDROMORPHONE HYDROCHLORIDE 0.2 MG: 1 INJECTION, SOLUTION INTRAMUSCULAR; INTRAVENOUS; SUBCUTANEOUS at 09:36

## 2025-05-28 RX ADMIN — FENTANYL CITRATE 50 MCG: 50 INJECTION, SOLUTION INTRAMUSCULAR; INTRAVENOUS at 08:55

## 2025-05-28 RX ADMIN — SODIUM CHLORIDE, SODIUM LACTATE, POTASSIUM CHLORIDE, AND CALCIUM CHLORIDE 100 ML/HR: .6; .31; .03; .02 INJECTION, SOLUTION INTRAVENOUS at 12:00

## 2025-05-28 RX ADMIN — CYCLOBENZAPRINE HYDROCHLORIDE 5 MG: 5 TABLET, FILM COATED ORAL at 20:24

## 2025-05-28 RX ADMIN — HYDROMORPHONE HYDROCHLORIDE 0.5 MG: 1 INJECTION, SOLUTION INTRAMUSCULAR; INTRAVENOUS; SUBCUTANEOUS at 12:04

## 2025-05-28 RX ADMIN — Medication 0.05 MCG/KG/MIN: at 07:50

## 2025-05-28 RX ADMIN — ENOXAPARIN SODIUM 40 MG: 40 INJECTION SUBCUTANEOUS at 20:24

## 2025-05-28 RX ADMIN — DEXAMETHASONE SODIUM PHOSPHATE 8 MG: 4 INJECTION, SOLUTION INTRAMUSCULAR; INTRAVENOUS at 14:56

## 2025-05-28 RX ADMIN — GLYCOPYRROLATE 0.2 MG: 0.2 INJECTION, SOLUTION INTRAMUSCULAR; INTRAVENOUS at 10:43

## 2025-05-28 RX ADMIN — FENTANYL CITRATE 50 MCG: 50 INJECTION, SOLUTION INTRAMUSCULAR; INTRAVENOUS at 07:44

## 2025-05-28 RX ADMIN — CEFAZOLIN 2 G: 1 INJECTION, POWDER, FOR SOLUTION INTRAMUSCULAR; INTRAVENOUS at 11:33

## 2025-05-28 RX ADMIN — GLYCOPYRROLATE 0.2 MG: 0.2 INJECTION, SOLUTION INTRAMUSCULAR; INTRAVENOUS at 07:30

## 2025-05-28 RX ADMIN — CEFAZOLIN 2 G: 1 INJECTION, POWDER, FOR SOLUTION INTRAMUSCULAR; INTRAVENOUS at 07:47

## 2025-05-28 RX ADMIN — ROCURONIUM 20 MG: 100 INJECTION, SOLUTION INTRAVENOUS at 09:32

## 2025-05-28 RX ADMIN — ROCURONIUM 50 MG: 100 INJECTION, SOLUTION INTRAVENOUS at 08:57

## 2025-05-28 RX ADMIN — ROCURONIUM 5 MG: 100 INJECTION, SOLUTION INTRAVENOUS at 07:44

## 2025-05-28 RX ADMIN — HYDROMORPHONE HYDROCHLORIDE 0.2 MG: 1 INJECTION, SOLUTION INTRAMUSCULAR; INTRAVENOUS; SUBCUTANEOUS at 08:55

## 2025-05-28 RX ADMIN — HYDROMORPHONE HYDROCHLORIDE 0.5 MG: 1 INJECTION, SOLUTION INTRAMUSCULAR; INTRAVENOUS; SUBCUTANEOUS at 12:54

## 2025-05-28 RX ADMIN — DEXAMETHASONE SODIUM PHOSPHATE 8 MG: 4 INJECTION, SOLUTION INTRAMUSCULAR; INTRAVENOUS at 21:33

## 2025-05-28 RX ADMIN — SUGAMMADEX 200 MG: 100 INJECTION, SOLUTION INTRAVENOUS at 11:45

## 2025-05-28 RX ADMIN — SODIUM CHLORIDE, POTASSIUM CHLORIDE, SODIUM LACTATE AND CALCIUM CHLORIDE: 600; 310; 30; 20 INJECTION, SOLUTION INTRAVENOUS at 10:46

## 2025-05-28 RX ADMIN — HYDROMORPHONE HYDROCHLORIDE 0.2 MG: 1 INJECTION, SOLUTION INTRAMUSCULAR; INTRAVENOUS; SUBCUTANEOUS at 09:58

## 2025-05-28 RX ADMIN — HYDROMORPHONE HYDROCHLORIDE 0.2 MG: 0.2 INJECTION, SOLUTION INTRAMUSCULAR; INTRAVENOUS; SUBCUTANEOUS at 18:45

## 2025-05-28 RX ADMIN — CARBOXYMETHYLCELLULOSE SODIUM 1 DROP: 0.5 SOLUTION/ DROPS OPHTHALMIC at 18:03

## 2025-05-28 RX ADMIN — ROCURONIUM 20 MG: 100 INJECTION, SOLUTION INTRAVENOUS at 10:19

## 2025-05-28 RX ADMIN — OXYCODONE HYDROCHLORIDE 10 MG: 5 SOLUTION ORAL at 21:23

## 2025-05-28 RX ADMIN — SODIUM CHLORIDE, POTASSIUM CHLORIDE, SODIUM LACTATE AND CALCIUM CHLORIDE: 600; 310; 30; 20 INJECTION, SOLUTION INTRAVENOUS at 07:30

## 2025-05-28 RX ADMIN — ONDANSETRON 4 MG: 2 INJECTION, SOLUTION INTRAMUSCULAR; INTRAVENOUS at 11:05

## 2025-05-28 RX ADMIN — HYDROMORPHONE HYDROCHLORIDE 0.5 MG: 1 INJECTION, SOLUTION INTRAMUSCULAR; INTRAVENOUS; SUBCUTANEOUS at 13:31

## 2025-05-28 RX ADMIN — Medication 4 MCG: at 08:09

## 2025-05-28 RX ADMIN — OXYMETAZOLINE HYDROCHLORIDE 2 SPRAY: 0.05 SPRAY NASAL at 07:44

## 2025-05-28 RX ADMIN — OXYCODONE HYDROCHLORIDE 10 MG: 5 SOLUTION ORAL at 16:56

## 2025-05-28 RX ADMIN — Medication 4 MCG: at 09:58

## 2025-05-28 RX ADMIN — DEXAMETHASONE SODIUM PHOSPHATE 10 MG: 4 INJECTION, SOLUTION INTRAMUSCULAR; INTRAVENOUS at 07:44

## 2025-05-28 RX ADMIN — CARBOXYMETHYLCELLULOSE SODIUM 4 DROP: 5 SOLUTION/ DROPS OPHTHALMIC at 07:44

## 2025-05-28 RX ADMIN — HYDROMORPHONE HYDROCHLORIDE 0.2 MG: 0.2 INJECTION, SOLUTION INTRAMUSCULAR; INTRAVENOUS; SUBCUTANEOUS at 22:36

## 2025-05-28 RX ADMIN — HYDROMORPHONE HYDROCHLORIDE 0.4 MG: 1 INJECTION, SOLUTION INTRAMUSCULAR; INTRAVENOUS; SUBCUTANEOUS at 09:44

## 2025-05-28 RX ADMIN — AMPICILLIN SODIUM AND SULBACTAM SODIUM 3 G: 2; 1 INJECTION, POWDER, FOR SOLUTION INTRAMUSCULAR; INTRAVENOUS at 18:03

## 2025-05-28 RX ADMIN — Medication 6 L/MIN: at 11:56

## 2025-05-28 RX ADMIN — MIDAZOLAM HYDROCHLORIDE 2 MG: 1 INJECTION, SOLUTION INTRAMUSCULAR; INTRAVENOUS at 07:30

## 2025-05-28 RX ADMIN — TOBRAMYCIN AND DEXAMETHASONE 1 DROP: 1; 3 SUSPENSION/ DROPS OPHTHALMIC at 17:56

## 2025-05-28 RX ADMIN — CARBOXYMETHYLCELLULOSE SODIUM 1 DROP: 0.5 SOLUTION/ DROPS OPHTHALMIC at 21:38

## 2025-05-28 RX ADMIN — KETOROLAC TROMETHAMINE 30 MG: 30 INJECTION, SOLUTION INTRAMUSCULAR at 11:05

## 2025-05-28 RX ADMIN — Medication 49 MCG: at 09:40

## 2025-05-28 RX ADMIN — SODIUM CHLORIDE, SODIUM LACTATE, POTASSIUM CHLORIDE, CALCIUM CHLORIDE: 600; 310; 30; 20 INJECTION, SOLUTION INTRAVENOUS at 07:48

## 2025-05-28 RX ADMIN — LIDOCAINE HYDROCHLORIDE 4 ML: 40 SOLUTION TOPICAL at 07:47

## 2025-05-28 RX ADMIN — Medication 4 MCG: at 11:15

## 2025-05-28 SDOH — SOCIAL STABILITY: SOCIAL INSECURITY
WITHIN THE LAST YEAR, HAVE YOU BEEN RAPED OR FORCED TO HAVE ANY KIND OF SEXUAL ACTIVITY BY YOUR PARTNER OR EX-PARTNER?: NO

## 2025-05-28 SDOH — ECONOMIC STABILITY: INCOME INSECURITY
IN THE PAST 12 MONTHS HAS THE ELECTRIC, GAS, OIL, OR WATER COMPANY THREATENED TO SHUT OFF SERVICES IN YOUR HOME?: PATIENT DECLINED

## 2025-05-28 SDOH — ECONOMIC STABILITY: HOUSING INSECURITY: IN THE PAST 12 MONTHS, HOW MANY TIMES HAVE YOU MOVED WHERE YOU WERE LIVING?: 0

## 2025-05-28 SDOH — SOCIAL STABILITY: SOCIAL INSECURITY: WITHIN THE LAST YEAR, HAVE YOU BEEN HUMILIATED OR EMOTIONALLY ABUSED IN OTHER WAYS BY YOUR PARTNER OR EX-PARTNER?: NO

## 2025-05-28 SDOH — SOCIAL STABILITY: SOCIAL INSECURITY: DO YOU FEEL UNSAFE GOING BACK TO THE PLACE WHERE YOU ARE LIVING?: NO

## 2025-05-28 SDOH — SOCIAL STABILITY: SOCIAL INSECURITY
ASK PARENT OR GUARDIAN: ARE THERE TIMES WHEN YOU, YOUR CHILD(REN), OR ANY MEMBER OF YOUR HOUSEHOLD FEEL UNSAFE, HARMED, OR THREATENED AROUND PERSONS WITH WHOM YOU KNOW OR LIVE?: NO

## 2025-05-28 SDOH — SOCIAL STABILITY: SOCIAL INSECURITY: HAS ANYONE EVER THREATENED TO HURT YOUR FAMILY OR YOUR PETS?: NO

## 2025-05-28 SDOH — ECONOMIC STABILITY: FOOD INSECURITY
WITHIN THE PAST 12 MONTHS, YOU WORRIED THAT YOUR FOOD WOULD RUN OUT BEFORE YOU GOT THE MONEY TO BUY MORE.: PATIENT DECLINED

## 2025-05-28 SDOH — SOCIAL STABILITY: SOCIAL INSECURITY
WITHIN THE LAST YEAR, HAVE YOU BEEN KICKED, HIT, SLAPPED, OR OTHERWISE PHYSICALLY HURT BY YOUR PARTNER OR EX-PARTNER?: NO

## 2025-05-28 SDOH — SOCIAL STABILITY: SOCIAL INSECURITY: WITHIN THE LAST YEAR, HAVE YOU BEEN AFRAID OF YOUR PARTNER OR EX-PARTNER?: NO

## 2025-05-28 SDOH — SOCIAL STABILITY: SOCIAL INSECURITY: ABUSE: ADULT

## 2025-05-28 SDOH — ECONOMIC STABILITY: HOUSING INSECURITY: AT ANY TIME IN THE PAST 12 MONTHS, WERE YOU HOMELESS OR LIVING IN A SHELTER (INCLUDING NOW)?: NO

## 2025-05-28 SDOH — SOCIAL STABILITY: SOCIAL INSECURITY: HAVE YOU HAD ANY THOUGHTS OF HARMING ANYONE ELSE?: NO

## 2025-05-28 SDOH — ECONOMIC STABILITY: HOUSING INSECURITY: IN THE LAST 12 MONTHS, WAS THERE A TIME WHEN YOU WERE NOT ABLE TO PAY THE MORTGAGE OR RENT ON TIME?: NO

## 2025-05-28 SDOH — ECONOMIC STABILITY: FOOD INSECURITY: HOW HARD IS IT FOR YOU TO PAY FOR THE VERY BASICS LIKE FOOD, HOUSING, MEDICAL CARE, AND HEATING?: NOT VERY HARD

## 2025-05-28 SDOH — SOCIAL STABILITY: SOCIAL INSECURITY: HAVE YOU HAD THOUGHTS OF HARMING ANYONE ELSE?: YES

## 2025-05-28 SDOH — ECONOMIC STABILITY: TRANSPORTATION INSECURITY: IN THE PAST 12 MONTHS, HAS LACK OF TRANSPORTATION KEPT YOU FROM MEDICAL APPOINTMENTS OR FROM GETTING MEDICATIONS?: NO

## 2025-05-28 SDOH — ECONOMIC STABILITY: FOOD INSECURITY: WITHIN THE PAST 12 MONTHS, THE FOOD YOU BOUGHT JUST DIDN'T LAST AND YOU DIDN'T HAVE MONEY TO GET MORE.: PATIENT DECLINED

## 2025-05-28 SDOH — SOCIAL STABILITY: SOCIAL INSECURITY: DOES ANYONE TRY TO KEEP YOU FROM HAVING/CONTACTING OTHER FRIENDS OR DOING THINGS OUTSIDE YOUR HOME?: NO

## 2025-05-28 SDOH — SOCIAL STABILITY: SOCIAL INSECURITY: ARE THERE ANY APPARENT SIGNS OF INJURIES/BEHAVIORS THAT COULD BE RELATED TO ABUSE/NEGLECT?: NO

## 2025-05-28 SDOH — SOCIAL STABILITY: SOCIAL INSECURITY: WERE YOU ABLE TO COMPLETE ALL THE BEHAVIORAL HEALTH SCREENINGS?: YES

## 2025-05-28 SDOH — SOCIAL STABILITY: SOCIAL INSECURITY: DO YOU FEEL ANYONE HAS EXPLOITED OR TAKEN ADVANTAGE OF YOU FINANCIALLY OR OF YOUR PERSONAL PROPERTY?: NO

## 2025-05-28 SDOH — ECONOMIC STABILITY: HOUSING INSECURITY: DO YOU FEEL UNSAFE GOING BACK TO THE PLACE WHERE YOU LIVE?: NO

## 2025-05-28 SDOH — SOCIAL STABILITY: SOCIAL INSECURITY: ARE YOU OR HAVE YOU BEEN THREATENED OR ABUSED PHYSICALLY, EMOTIONALLY, OR SEXUALLY BY ANYONE?: NO

## 2025-05-28 ASSESSMENT — PAIN SCALES - GENERAL
PAINLEVEL_OUTOF10: 5 - MODERATE PAIN
PAINLEVEL_OUTOF10: 7
PAINLEVEL_OUTOF10: 10 - WORST POSSIBLE PAIN
PAINLEVEL_OUTOF10: 0 - NO PAIN
PAINLEVEL_OUTOF10: 5 - MODERATE PAIN
PAINLEVEL_OUTOF10: 8
PAINLEVEL_OUTOF10: 8
PAINLEVEL_OUTOF10: 6
PAINLEVEL_OUTOF10: 10 - WORST POSSIBLE PAIN
PAINLEVEL_OUTOF10: 7
PAINLEVEL_OUTOF10: 10 - WORST POSSIBLE PAIN
PAINLEVEL_OUTOF10: 10 - WORST POSSIBLE PAIN
PAINLEVEL_OUTOF10: 7
PAINLEVEL_OUTOF10: 5 - MODERATE PAIN
PAINLEVEL_OUTOF10: 9

## 2025-05-28 ASSESSMENT — COGNITIVE AND FUNCTIONAL STATUS - GENERAL
WALKING IN HOSPITAL ROOM: A LITTLE
MOBILITY SCORE: 18
HELP NEEDED FOR BATHING: A LITTLE
WALKING IN HOSPITAL ROOM: A LITTLE
DRESSING REGULAR LOWER BODY CLOTHING: A LITTLE
MOVING FROM LYING ON BACK TO SITTING ON SIDE OF FLAT BED WITH BEDRAILS: A LITTLE
HELP NEEDED FOR BATHING: A LITTLE
MOVING TO AND FROM BED TO CHAIR: A LITTLE
DRESSING REGULAR LOWER BODY CLOTHING: A LITTLE
MOVING FROM LYING ON BACK TO SITTING ON SIDE OF FLAT BED WITH BEDRAILS: A LITTLE
STANDING UP FROM CHAIR USING ARMS: A LITTLE
WALKING IN HOSPITAL ROOM: A LITTLE
STANDING UP FROM CHAIR USING ARMS: A LITTLE
DAILY ACTIVITIY SCORE: 19
TOILETING: A LITTLE
DAILY ACTIVITIY SCORE: 19
DRESSING REGULAR UPPER BODY CLOTHING: A LITTLE
EATING MEALS: A LITTLE
TURNING FROM BACK TO SIDE WHILE IN FLAT BAD: A LITTLE
DRESSING REGULAR UPPER BODY CLOTHING: A LITTLE
TOILETING: A LITTLE
MOVING FROM LYING ON BACK TO SITTING ON SIDE OF FLAT BED WITH BEDRAILS: A LITTLE
PERSONAL GROOMING: A LITTLE
TOILETING: A LITTLE
TURNING FROM BACK TO SIDE WHILE IN FLAT BAD: A LITTLE
MOVING TO AND FROM BED TO CHAIR: A LITTLE
TOILETING: A LITTLE
DAILY ACTIVITIY SCORE: 19
PERSONAL GROOMING: A LITTLE
PERSONAL GROOMING: A LITTLE
DRESSING REGULAR LOWER BODY CLOTHING: A LITTLE
MOVING TO AND FROM BED TO CHAIR: A LITTLE
CLIMB 3 TO 5 STEPS WITH RAILING: A LITTLE
DRESSING REGULAR UPPER BODY CLOTHING: A LITTLE
STANDING UP FROM CHAIR USING ARMS: A LITTLE
MOBILITY SCORE: 18
DAILY ACTIVITIY SCORE: 19
MOBILITY SCORE: 18
DRESSING REGULAR UPPER BODY CLOTHING: A LITTLE
PATIENT BASELINE BEDBOUND: NO
HELP NEEDED FOR BATHING: A LITTLE
CLIMB 3 TO 5 STEPS WITH RAILING: A LITTLE
TURNING FROM BACK TO SIDE WHILE IN FLAT BAD: A LITTLE
DRESSING REGULAR LOWER BODY CLOTHING: A LITTLE
CLIMB 3 TO 5 STEPS WITH RAILING: A LITTLE
PERSONAL GROOMING: A LITTLE

## 2025-05-28 ASSESSMENT — ACTIVITIES OF DAILY LIVING (ADL)
DRESSING YOURSELF: INDEPENDENT
HEARING - RIGHT EAR: FUNCTIONAL
WALKS IN HOME: INDEPENDENT
GROOMING: INDEPENDENT
PATIENT'S MEMORY ADEQUATE TO SAFELY COMPLETE DAILY ACTIVITIES?: YES
LACK_OF_TRANSPORTATION: NO
FEEDING YOURSELF: INDEPENDENT
JUDGMENT_ADEQUATE_SAFELY_COMPLETE_DAILY_ACTIVITIES: YES
BATHING: INDEPENDENT
LACK_OF_TRANSPORTATION: NO
HEARING - LEFT EAR: FUNCTIONAL
TOILETING: INDEPENDENT
LACK_OF_TRANSPORTATION: NO
ADEQUATE_TO_COMPLETE_ADL: YES

## 2025-05-28 ASSESSMENT — PAIN DESCRIPTION - ORIENTATION
ORIENTATION: LEFT

## 2025-05-28 ASSESSMENT — PAIN DESCRIPTION - LOCATION
LOCATION: JAW
LOCATION: FACE

## 2025-05-28 ASSESSMENT — COLUMBIA-SUICIDE SEVERITY RATING SCALE - C-SSRS
1. IN THE PAST MONTH, HAVE YOU WISHED YOU WERE DEAD OR WISHED YOU COULD GO TO SLEEP AND NOT WAKE UP?: NO
2. HAVE YOU ACTUALLY HAD ANY THOUGHTS OF KILLING YOURSELF?: NO
6. HAVE YOU EVER DONE ANYTHING, STARTED TO DO ANYTHING, OR PREPARED TO DO ANYTHING TO END YOUR LIFE?: NO

## 2025-05-28 ASSESSMENT — PAIN - FUNCTIONAL ASSESSMENT
PAIN_FUNCTIONAL_ASSESSMENT: UNABLE TO SELF-REPORT
PAIN_FUNCTIONAL_ASSESSMENT: 0-10
PAIN_FUNCTIONAL_ASSESSMENT: UNABLE TO SELF-REPORT
PAIN_FUNCTIONAL_ASSESSMENT: 0-10
PAIN_FUNCTIONAL_ASSESSMENT: UNABLE TO SELF-REPORT
PAIN_FUNCTIONAL_ASSESSMENT: 0-10

## 2025-05-28 ASSESSMENT — ENCOUNTER SYMPTOMS
FACIAL SWELLING: 0
TROUBLE SWALLOWING: 0
SHORTNESS OF BREATH: 0

## 2025-05-28 ASSESSMENT — PAIN DESCRIPTION - DESCRIPTORS: DESCRIPTORS: SHARP;HEADACHE

## 2025-05-28 ASSESSMENT — PATIENT HEALTH QUESTIONNAIRE - PHQ9
1. LITTLE INTEREST OR PLEASURE IN DOING THINGS: NOT AT ALL
2. FEELING DOWN, DEPRESSED OR HOPELESS: NOT AT ALL
SUM OF ALL RESPONSES TO PHQ9 QUESTIONS 1 & 2: 0

## 2025-05-28 ASSESSMENT — LIFESTYLE VARIABLES
HOW OFTEN DO YOU HAVE 6 OR MORE DRINKS ON ONE OCCASION: NEVER
HOW MANY STANDARD DRINKS CONTAINING ALCOHOL DO YOU HAVE ON A TYPICAL DAY: PATIENT DOES NOT DRINK
AUDIT-C TOTAL SCORE: 0
AUDIT-C TOTAL SCORE: 0
HOW OFTEN DO YOU HAVE A DRINK CONTAINING ALCOHOL: NEVER
SKIP TO QUESTIONS 9-10: 1

## 2025-05-28 NOTE — PROGRESS NOTES
05/28/25 1507   Discharge Planning   Living Arrangements Spouse/significant other   Support Systems Spouse/significant other   Assistance Needed Independent prior to admission   Type of Residence Private residence   Number of Stairs to Enter Residence 0   Number of Stairs Within Residence 1   Do you have animals or pets at home? No   Home or Post Acute Services None   Expected Discharge Disposition Home   Does the patient need discharge transport arranged? No   Financial Resource Strain   How hard is it for you to pay for the very basics like food, housing, medical care, and heating? Not very   Housing Stability   In the last 12 months, was there a time when you were not able to pay the mortgage or rent on time? N   In the past 12 months, how many times have you moved where you were living? 0   At any time in the past 12 months, were you homeless or living in a shelter (including now)? N   Transportation Needs   In the past 12 months, has lack of transportation kept you from medical appointments or from getting medications? no   In the past 12 months, has lack of transportation kept you from meetings, work, or from getting things needed for daily living? No   Stroke Family Assessment   Stroke Family Assessment Needed No   Intensity of Service   Intensity of Service 0-30 min     Patient is post-op from an arthrotomy of left TMJ.  PLAN/BARRIER: pain control, tolerate diet  DISP: home  HHC: none  O2: none  WOUNDS: surgical  ADOD: 1-2 days  Patient's spouse will transport her home at discharge.  Patient has no discharge need.  Kayce Yip RN

## 2025-05-28 NOTE — POST-PROCEDURE NOTE
61 yo F POD 0 s/p L TMJR evaluated on the floor    Subjective:  - reports 10/10 jaw pain and moderate L eye pain      Objective:  - Lying comfortably in bed with telfa island dressing in place  - Minimal L CNVII marginal mandibular branch weakness, minimal L CNV1/2/3 hypoesthesia, worst in CNV3. CNV and CNVII otherwise intact.  - L preauricular and submandibular incisions intact and hemostatic  - mild conjunctival injection and periorbital erythema in L eye    Assessment: 61 yo F POD 0 s/p L TMJR evaluated on the floor, doing well. Complaining of L joint pain as expected from surgery, and L eye pain consistent with corneal abrasion.    Plan:    Feed - clear liquid diet, upgrade to full liquid as tolerated  Analgesia/sedation/anxiolytic -  scheduled tylenol, scheduled toradol 3x doses, scheduled ibuprofen following 3x doses toradol, PRN oxycodone, dilaudid breakthrough pain, PRN lubricating and antibacterial eye drops in L eye as ordered  Volume - LR 75mL/hr  Oral surgery - CT scan, bedside suction and red rubber catheter  Respiratory - ambulation as tolerated  Infectious disease - unasyn 3g  Transfusion - if HbG count <7, consider transfusion  Embolic prophylaxis - SCDs and sub-q heparin  Tubes/lines/drains - maintain peripheral IV  Continue home meds in AM as indicated in orders  Special - plan for dispo to home 5/29 if ambulation and nutrition are adequate      Edelmira Duron DDS  [facility] PGY-2  Team Pager: [phone]  Available on [employer]iku

## 2025-05-28 NOTE — PROGRESS NOTES
05/28/25 1507   [facility] Disability Status   Are you deaf or do you have serious difficulty hearing? N   Are you blind or do you have serious difficulty seeing, even when wearing glasses? N   Because of a physical, mental, or emotional condition, do you have serious difficulty concentrating, remembering, or making decisions? (5 years old or older) N   Do you have serious difficulty walking or climbing stairs? N   Do you have serious difficulty dressing or bathing? N   Because of a physical, mental, or emotional condition, do you have serious difficulty doing errands alone such as visiting the doctor? N

## 2025-05-28 NOTE — ANESTHESIA POSTPROCEDURE EVALUATION
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      (Arthraligia of TMJ Left [M26.62])      (Articular Disc Disorder TMJ [M26.63])    Surgeons: Calixto Goodrich MD, DDS Responsible Provider: Florin Curry MD    Anesthesia Type: general ASA Status: 2            Anesthesia Type: general    Vitals Value Taken Time   /73 05/28/25 13:15   Temp 36 °C (96.8 °F) 05/28/25 12:54   Pulse 79 05/28/25 13:15   Resp 12 05/28/25 13:15   SpO2 100 % 05/28/25 13:15       Anesthesia Post Evaluation    Patient participation: complete - patient participated  Level of consciousness: awake  Pain management: satisfactory to patient  Airway patency: patent  Cardiovascular status: acceptable and hemodynamically stable  Respiratory status: acceptable and nonlabored ventilation  Hydration status: balanced  Postoperative Nausea and Vomiting: none        There were no known notable events for this encounter.

## 2025-05-28 NOTE — BRIEF OP NOTE
Date: 2025  OR Location: 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 [M26.632]     Procedures  Left Temporomandibular Joint Arthrotomy  02166 - MO ARTHROTOMY TEMPOROMANDIBULAR JOINT    Left Temporomandibular Joint Arthrotomy  91184 - MO CONDYLECTOMY TEMPOROMANDIBULAR JOINT SPX    Left Temporomandibular Joint Arthrotomy  56039 - MO IMPRESSION & PREPARATION ORAL SURGICAL SPLINT    Left Temporomandibular Joint Arthroplasty  01966 - MO ARTHRP TEMPOROMANDIBULAR JOINT W/WO AUTOGRAFT      Surgeons      * Calixto Goodrich - Primary    Resident/Fellow/Other Assistant:  Surgeons and Role:  * No surgeons found with a matching role *    Staff:   Circulator: Lacy Abdi Person: Mark  Circulator: Katalina Gil Circulator: Katalina Gil Scrub: Gail Gil Scrub: Albert    Anesthesia Staff: Anesthesiologist: Florin Curry MD  CRNA: JESUS Boswell-CRNA  C-AA: RANGEL Rand; RANGEL Moore    Procedure Summary  Anesthesia: General  ASA: II  Estimated Blood Loss: 50 mL  Intra-op Medications:   Administrations occurring from 0705 to 1255 on 25:   Medication Name Total Dose   sodium chloride 0.9 % irrigation solution 4,000 mL   lidocaine-epinephrine (Xylocaine W/EPI) 1 %-1:100,000 injection 4 mL   ceFAZolin (Ancef) vial 1 g 4 g   dexAMETHasone (Decadron) 4 mg/mL IV Syringe 2 mL 10 mg   dexMEDETOMidine 4 mcg/mL in NS syringe 20 mcg   fentaNYL (Sublimaze) injection 50 mcg/mL 100 mcg   glycopyrrolate PF (Robinul) injection 0.4 mg   HYDROmorphone (Dilaudid) injection 1 mg/mL 1 mg   ketorolac (Toradol) injection 30 mg 30 mg   LR infusion Cannot be  calculated   lactated Ringer's infusion Cannot be calculated   lidocaine (Xylocaine) 2 % 100 mg   lidocaine (LTA Kit) for intubation 4 mL   lubricating eye drops ophthalmic solution 4 drop   midazolam PF (Versed) injection 1 mg/mL 2 mg   ondansetron (Zofran) 2 mg/mL injection 4 mg   oxymetazoline (Afrin) nasal spray 0.05 % 2 spray   propofol (Diprivan) injection 10 mg/mL 1,199.49 mg   remifentanil (Ultiva) 2,000 mcg in sodium chloride 0.9% 50 mL (40 mcg/mL) infusion 2 mg   rocuronium (ZeMuron) 50 mg/5 mL injection 105 mg   succinylcholine (Anectine) 20 mg/mL injection 60 mg   sugammadex (Bridion) 200 mg/2 mL injection 200 mg              Anesthesia Record               Intraprocedure I/O Totals          Intake    Remifentanil Drip 0.00 mL    The total shown is the total volume documented since Anesthesia Start was filed.    LR infusion 1000.00 mL    lactated Ringer's 700.00 mL    Total Intake 1700 mL       Output    Urine 0 mL    Est. Blood Loss 60 mL    Total Output 60 mL       Net    Net Volume 1640 mL          Specimen:   ID Type Source Tests Collected by Time   1 : LEFT TMJ Tissue BONE RESECTION SURGICAL PATHOLOGY EXAM Calixto Goodrich MD, DDS 5/28/2025 0523      Findings: Left TMJ degenerative disease.     Complications:  None; patient tolerated the procedure well.     Disposition: PACU - hemodynamically stable.  Condition: stable  Specimens Collected:   ID Type Source Tests Collected by Time   1 : LEFT TMJ Tissue BONE RESECTION SURGICAL PATHOLOGY EXAM Calixto Goodrich MD, DDS 5/28/2025 7781     Attending Attestation:     Calixto Goodrich  Phone Number: [phone]

## 2025-05-28 NOTE — H&P
History Of Present Illness  Nereida Conway is a 60 y.o. female presenting with a history of left sided joint pain. She has attempted conservative therapy in the past with no relief, her pain is about 4/10 throughout the day and can increase to about 8/10 overnight. She feels like there are needles in her ear with associated headaches and submandibular pain. She subjectively repots difficulty opening and a decrease in quality of life.       Past Medical History  Anxiety, depression, HTN, HLD, peripheral neuropathy, migraines    Gabapentin, amitriptyline, lisinopril,. Citalopram, propanolol, tramadol, atorvastatin, topiramate    Surgical History  B/l hip replacement, arthrocentesis L, hysterectomy, , rotator cuff repair      Social History  She reports that she quit smoking about 12 years ago. Her smoking use included cigarettes. She started smoking about 22 years ago. She has a 10 pack-year smoking history. She has never been exposed to tobacco smoke. She has never used smokeless tobacco. She reports current alcohol use. She reports that she does not use drugs.       Allergies  Duloxetine, Indomethacin, Prednisone, and Sulfamethoxazole, denies allergy to cheap jewelry     Review of Systems   HENT:  Negative for ear discharge, facial swelling and trouble swallowing.    Respiratory:  Negative for shortness of breath.         Physical Exam  HENT:      Head:      Comments: CN V and CN VII intact and equal b/l. Pain on palpation of L TMJ joint and submandibular area.      Nose: Nose normal.      Mouth/Throat:      Mouth: Mucous membranes are moist.      Comments: Occlusion stable and reproducible. KRISTOPHER around 22mm. Moderately restored dentition in good repair. Fom soft nontender. Limited excursive movements.   Eyes:      Conjunctiva/sclera: Conjunctivae normal.   Cardiovascular:      Comments: Warm and well perfused  Pulmonary:      Comments: Patient breathing comfortably on room air  Skin:     General: Skin is  warm.   Neurological:      Mental Status: She is alert.          Last Recorded Vitals  Lab Results   Component Value Date    WBC 10.0 04/14/2025    HGB 14.1 04/14/2025    HCT 41.6 04/14/2025     04/14/2025     04/14/2025      There were no vitals taken for this visit.       Relevant Results  Interpreted By:  Wilver Youssef,   STUDY:  CT FACIAL BONES WO IV CONTRAST  3/12/2025 2:33 pm      INDICATION:  Signs/Symptoms:L TMJ replacement      ,M26.69 Other specified disorders of temporomandibular joint      COMPARISON:  None.      ACCESSION NUMBER(S):  [id number]      ORDERING CLINICIAN:  BARRY ARIAS      TECHNIQUE:  Thin cut axial CT images through the facial bones were obtained and  reconstructed in the coronal  and sagittal plane. 3D bone surface  renderings were constructed in rotational and tumble configurations  and reviewed.      FINDINGS:  Orbits: The bony orbits are intact. The orbital contents are  unremarkable.      Facial Bones: There is no displaced facial bone fracture.      Mandible/Temporomandibular Joints: There is some  flattening/remodeling of the left mandibular condylar head with  associated osteophyte formation. Small focus of calcification or bone  spur is seen within the temporomandibular joint between the superior  condylar head and overlying mandibular fossa. The articular eminence  is intact. There is no evidence of large joint effusion. Mandibular  heads are appropriately seated.      Paranasal Sinuses/Mastoids: Visualized paranasal sinuses and mastoids  are clear.      Soft tissues: Unremarkable.      Dentition: No significant endodontal and periodontal disease noted.  Multiple right-sided mandibular dental implants are noted.      IMPRESSION:  There is some flattening/remodeling of the left mandibular condylar  head with associated osteophyte formation. Small focus of  calcification or bone spur is seen within the temporomandibular joint  between the superior condylar  head and overlying mandibular fossa.  The articular eminence is intact. There is no evidence of large joint  effusion. Mandibular heads are appropriately seated.      MACRO:  None      Signed by: Wilver Youssef 3/12/2025 7:09 PM  Dictation workstation:   [id number]     Assessment & Plan      Nereida Conway is a 60 y.o. female presenting with a history of left sided joint pain, with radiographic osteophytes that failed conservative therapy planned for replacement of L TMJ joint, placement of stock joint through neck and face incisions and all other indicated procedures.         Juan David Diaz, DMD

## 2025-05-28 NOTE — ANESTHESIA PROCEDURE NOTES
Peripheral IV  Date/Time: 5/28/2025 7:54 AM  Inserted by: RANGEL Moore    Placement  Needle size: 18 G  Laterality: left  Location: hand  Local anesthetic: none  Site prep: alcohol  Technique: anatomical landmarks  Attempts: 1

## 2025-05-28 NOTE — OP NOTE
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 [M26.632]     Procedures  Left Temporomandibular Joint Arthrotomy  25940 - MO ARTHROTOMY TEMPOROMANDIBULAR JOINT    Left Temporomandibular Joint Arthrotomy  67066 - MO CONDYLECTOMY TEMPOROMANDIBULAR JOINT SPX    Left Temporomandibular Joint Arthrotomy  82157 - MO IMPRESSION & PREPARATION ORAL SURGICAL SPLINT    Left Temporomandibular Joint Arthroplasty  50122 - MO ARTHRP TEMPOROMANDIBULAR JOINT W/WO AUTOGRAFT      Surgeons      * Calixto Goodrich - Primary    Resident/Fellow/Other Assistant:  Surgeons and Role:  * No surgeons found with a matching role *    Staff:   Circulator: Lacy Abdi Person: Mark  Circulator: Katalina Gil Circulator: Katalina Gil Scrub: Gail Gil Scrub: Albert    Anesthesia Staff: Anesthesiologist: Florin Curry MD  CRNA: JESUS Boswell-CRNA  C-AA: RANGEL Rand; RANGEL Moore    Procedure Summary  Anesthesia: General  ASA: II  Estimated Blood Loss: 50mL  Intra-op Medications:   Administrations occurring from 0705 to 1255 on 25:   Medication Name Total Dose   sodium chloride 0.9 % irrigation solution 4,000 mL   lidocaine-epinephrine (Xylocaine W/EPI) 1 %-1:100,000 injection 4 mL   ceFAZolin (Ancef) vial 1 g 4 g   dexAMETHasone (Decadron) 4 mg/mL IV Syringe 2 mL 10 mg   dexMEDETOMidine 4 mcg/mL in NS syringe 20 mcg   fentaNYL (Sublimaze) injection 50 mcg/mL 100 mcg   glycopyrrolate PF (Robinul) injection 0.4 mg    HYDROmorphone (Dilaudid) injection 1 mg/mL 1 mg   ketorolac (Toradol) injection 30 mg 30 mg   LR infusion Cannot be calculated   lactated Ringer's infusion Cannot be calculated   lidocaine (Xylocaine) 2 % 100 mg   lidocaine (LTA Kit) for intubation 4 mL   lubricating eye drops ophthalmic solution 4 drop   midazolam PF (Versed) injection 1 mg/mL 2 mg   ondansetron (Zofran) 2 mg/mL injection 4 mg   oxymetazoline (Afrin) nasal spray 0.05 % 2 spray   propofol (Diprivan) injection 10 mg/mL 1,199.49 mg   remifentanil (Ultiva) 2,000 mcg in sodium chloride 0.9% 50 mL (40 mcg/mL) infusion 2 mg   rocuronium (ZeMuron) 50 mg/5 mL injection 105 mg   succinylcholine (Anectine) 20 mg/mL injection 60 mg   sugammadex (Bridion) 200 mg/2 mL injection 200 mg              Anesthesia Record               Intraprocedure I/O Totals          Intake    Remifentanil Drip 0.00 mL    The total shown is the total volume documented since Anesthesia Start was filed.    LR infusion 1000.00 mL    lactated Ringer's 700.00 mL    Total Intake 1700 mL       Output    Urine 0 mL    Est. Blood Loss 60 mL    Total Output 60 mL       Net    Net Volume 1640 mL          Specimen:   ID Type Source Tests Collected by Time   1 : LEFT TMJ Tissue BONE RESECTION SURGICAL PATHOLOGY EXAM Calixto Goodrcih MD, DDS 5/28/2025 0942        Drains and/or Catheters: * None in log *    Tourniquet Times:         Implants:  Implants       Type Name Action Serial No.      Screw SCREW, 2.2 X 9 IMF - SN/A - [id number] Implanted N/A     Screw SCREW, IMF 2.0 - SN/A - [id number] Implanted N/A     Screw SCREW, 5.5 CANNULATED, SHORT THREAD, 24MM - SN/A - [id number] Implanted N/A     Screw FOSSA, TMJ SMALL LEFT - SN/A - [id number] Implanted N/A     Other STANDARD TITANIUM TMJ SYSTEM LEFT STANDARD MANDIBULAR COMPONENT Implanted N/A     Screw SCREW, TMJ FOSSA 2 X 7 - SN/A - [id number] Implanted N/A     Screw SCREW, TMJ FOSSA 2 X 9 - SN/A - [id number] Implanted N/A     Screw SCREW, TMJ  "CONDYLE 2.7 X 8 - SN/A - [id number] Implanted N/A     Screw SCREW, EMERG 2.3 X 7 X-DRV - SN/A - [id number] Implanted N/A              Findings: Left TMJ Degenerative disease    Indications: Nereida Conway is an 60 y.o. female who is having surgery for Arthraligia of TMJ Left [M26.62]  Articular Disc Disorder TMJ [M26.63].   The patient was seen in the preoperative area. The risks, benefits, complications, treatment options, non-operative alternatives, expected recovery and outcomes were discussed with the patient. The possibilities of reaction to medication, pulmonary aspiration, injury to surrounding structures, bleeding, recurrent infection, the need for additional procedures, failure to diagnose a condition, and creating a complication requiring transfusion or operation were discussed with the patient. The patient concurred with the proposed plan, giving informed consent.  The site of surgery was properly noted/marked if necessary per policy. The patient has been actively warmed in preoperative area. Preoperative antibiotics have been ordered and given within 1 hours of incision. Venous thrombosis prophylaxis have been ordered including bilateral sequential compression devices    Procedure Details:   The patient was greeted in the pre-op holding area, where all preoperative risks and complications were reviewed. Later, the patient was brought into the operating room by the anesthesia staff and was placed in the supine position. A \"Time out\" was performed to confirmed patient's identity and the procedure to be performed. The patient was then induced for general anesthesia and intubated with a nasoendotracheal tube. Following intubation, the nasoendotracheal tube was secured by the surgical team using 2-0 silk suture and a standard head wrap. Intraorally, a throat pack was placed, and IMF screws were placed into the maxilla (x3) and mandible (x3). The patient was prepped and draped in standard oral and maxillofacial " surgery fashion. Oral cavity and nose were occluded with Ioban.    Attention was then turned to the left retromandibular planned incision site. An incision was made using a 15 blade extending from the skin to the subcutaneous fat layer.  The platysma was incised and a subplatysmal flap was elevated using a combination of blunt and sharp dissection.  A checkpoint nerve stimulator was used to monitor marginal mandibular nerve after confirming with anesthesia that patient was not paralyzed. Marginal mandibular nerve was not visualized, and there was no indication that it was violated during testing with the nerve stimulator. Dissection proceeded to the inferior border of the mandible/angle of the mandible. Once the pterygoid masseteric sling of the mandible was visualized and the bony mandible was palpable, the pterygoid masseteric sling was incised using electrocautery. Subperiosteal dissection was carried out to expose the mandibular angle, posterior border, anterior border and superiorly to expose sigmoid notch region.      The left preauricular incision was then marked with marking pen. 1% lidocaine with 1:100k epi was administered via subcutaneous infiltration at planned incision sites. A 15 blade was used to incision through skin and subcutaneous tissue down to superficial layer of temporalis fascia. Once this layer reached, the zygomatic arch was palpated and incision was made through superficial temporalis fascia down to fat later and carried to the arch. Periosteal elevator used to maintain contact on bony arch while dissecting anteriorly along the arch to expose the fossa and capsular ligament. Dissection then proceeded inferiorly in the plane of the initial incision site to exposed condyle and fossa through the capsular ligament. Once adequate exposure was achieved, the custom surgical marking guide was placed through the neck incision and seated and secured to the mandible with 2 screws. A sonopet was used  to create osteotomy, wood handle osteotome used to finalize osteotomy,  condyle. The condyle was stripped of its attachments and removed from the joint.. The glenoid fossa and articular eminence were then exposed and any excess tissue removed to fully expose the fossa. Remaining articular disc removed to accomplish this. The fossa component was then seated, was in stable position with no rocking, and this was secured to the temporal bone with appropriate sized screws.     One of the team members at this point went dirty/intraorally to place the patient in IMF with 25 guage wire in stable occlusion. The oral cavity was again occluded with Ioban. Gloves and gowns were changed. The ramus component was then seated through the retromandibular incision and positioned so the condylar component was in appropriate position in the fossa component. Once this position was achieved, the holes in ramus component prosthesis were drilled with copious irrigation and screw holes populated with appropriate sized screws.  One of the team members at this point went dirty and removed the IMF wires and assessed occlusion, which was stable and reproducible. The oral cavity was again occluded with Ioban.     Supraplatysmal fat was then harvested using bovie electrocautery from left retromandibular site.  Minimal amount of fat harvested. The fat was then packed into the left joint space between condylar head and fossa component.     The left neck and preauricular incisions were copiously irrigated with sterile saline and antibiotic solution. Floseal was placed into each incision. Closure completed in a layered fashion using 4-0 vicryl sutures to close the fascial layers, platysma, and then subcutaneous tissue. Skin was closed with 5-0 resorbable sutures in running continuous fashion.     The IMF screws x6 were removed. The throat pack was removed. The oral cavity was suctioned. The patient was carefully cleaned with a wet and dry  sponge.  Bacitracin applied to neck wounds. A Jaw bra and fluffs dressing were placed.      Care of the patient was then turned over to the anesthesia team. The patient was emerged from anesthesia, extubated and was taken to PACU in stable condition.       Dr. Goodrich was present for all critical portions of the case.   Evidence of Infection: No   Complications:  None; patient tolerated the procedure well.    Disposition: PACU - hemodynamically stable.  Condition: stable   Attending Attestation:     Calixto Goodrich  Phone Number: [phone]

## 2025-05-28 NOTE — ANESTHESIA PROCEDURE NOTES
Airway  Date/Time: 5/28/2025 7:47 AM  Reason: elective    Airway not difficult    Staffing  Performed: RANGEL   Authorized by: Florin Curry MD    Performed by: RANGEL Moore  Patient location during procedure: OR    Patient Condition  Indications for airway management: anesthesia and airway protection  Patient position: sniffing  MILS maintained throughout  Planned trial extubation  Sedation level: deep     Final Airway Details   Preoxygenated: yes  Final airway type: endotracheal airway  Successful airway: MELISSA tube and ETT  Cuffed: yes   Successful intubation technique: video laryngoscopy (Mcdonough 3)  Endotracheal tube insertion site: right naris  Blade: Nakia  Blade size: #3  ETT size (mm): 5.5  Cormack-Lehane Classification: grade I - full view of glottis  Placement verified by: chest auscultation and capnometry   Measured from: lips  Number of attempts at approach: 1  Number of other approaches attempted: 0    Additional Comments  Easy grade I view. Teeth and lips in pre-op condition. Afrin used. 28, 30, 32 F NTs used for dilation. Anterior-CP needed with Magills.

## 2025-05-28 NOTE — ANESTHESIA PREPROCEDURE EVALUATION
Patient: Nereida Conway    Procedure Information       Date/Time: 25 0705    Procedures:       Left Temporomandibular Joint Arthrotomy (Left: Face) - CPT: 19298 Laterality Left      Left Temporomandibular Joint Arthroplasty (Left: Face)    Location: [facility] OR 06 / [facility] OR    Surgeons: Calixto Goodrich MD, DDS          61 yo F hx DDD, controlled GERD, HTN, Raynauds, Ca score 59 in .  She doesn't remember her reaction to indomethacin that was long ago.  With Prednisone, she thinks she had swelling around her eyes (no throat swelling)    Relevant Problems   Cardiac   (+) HTN (hypertension)   (+) Hyperlipidemia   (+) PAD (peripheral artery disease)      Neuro   (+) Anxiety   (+) Depression   (+) Idiopathic peripheral neuropathy   (+) Lumbar radiculitis   (+) Peripheral neuropathy   (+) Right sided sciatica      GI   (+) Chronic diarrhea   (+) GERD (gastroesophageal reflux disease)   (+) Irritable bowel syndrome with diarrhea      Musculoskeletal   (+) Chronic right-sided low back pain with right-sided sciatica   (+) DDD (degenerative disc disease), lumbar   (+) Degenerative joint disease (DJD) of hip   (+) Osteoarthritis, hip, bilateral      HEENT   (+) Acute non-recurrent maxillary sinusitis      ID   (+) Genital herpes   (+) Onychomycosis of toenail       Clinical information reviewed:   Tobacco  Allergies  Meds   Med Hx  Surg Hx  OB Status  Fam Hx  Soc   Hx        NPO Detail:  NPO/Void Status  Carbohydrate Drink Given Prior to Surgery? : N  Date of Last Liquid: 25  Time of Last Liquid:   Date of Last Solid: 25  Time of Last Solid:   Last Intake Type: Clear fluids  Time of Last Void: 627         Physical Exam    Airway  Mallampati: III  TM distance: <3 FB  Neck ROM: full  Mouth openin finger widths  Comments: Limited OA due to pain      Cardiovascular Rate: normal     Dental - normal exam     Pulmonary - normal exam   Abdominal            Anesthesia Plan    History of  general anesthesia?: yes  History of complications of general anesthesia?: no    ASA 2     general     intravenous induction   Postoperative pain plan includes opioids.  Anesthetic plan and risks discussed with patient.

## 2025-05-29 PROCEDURE — 2500000004 HC RX 250 GENERAL PHARMACY W/ HCPCS (ALT 636 FOR OP/ED)

## 2025-05-29 PROCEDURE — 2500000001 HC RX 250 WO HCPCS SELF ADMINISTERED DRUGS (ALT 637 FOR MEDICARE OP)

## 2025-05-29 PROCEDURE — 7100000011 HC EXTENDED STAY RECOVERY HOURLY - NURSING UNIT

## 2025-05-29 PROCEDURE — 96372 THER/PROPH/DIAG INJ SC/IM: CPT

## 2025-05-29 PROCEDURE — 2500000004 HC RX 250 GENERAL PHARMACY W/ HCPCS (ALT 636 FOR OP/ED): Mod: JZ

## 2025-05-29 RX ORDER — SENNOSIDES 8.6 MG/1
1 TABLET ORAL NIGHTLY
Status: DISCONTINUED | OUTPATIENT
Start: 2025-05-29 | End: 2025-05-30 | Stop reason: HOSPADM

## 2025-05-29 RX ORDER — ACETAMINOPHEN 325 MG/1
975 TABLET ORAL EVERY 8 HOURS
Status: DISCONTINUED | OUTPATIENT
Start: 2025-05-29 | End: 2025-05-30 | Stop reason: HOSPADM

## 2025-05-29 RX ORDER — OXYCODONE HCL 5 MG/5 ML
5 SOLUTION, ORAL ORAL EVERY 4 HOURS PRN
Refills: 0 | Status: DISCONTINUED | OUTPATIENT
Start: 2025-05-29 | End: 2025-05-30 | Stop reason: HOSPADM

## 2025-05-29 RX ORDER — ACETAMINOPHEN 160 MG/5ML
975 SOLUTION ORAL EVERY 8 HOURS
Status: DISCONTINUED | OUTPATIENT
Start: 2025-05-29 | End: 2025-05-30 | Stop reason: HOSPADM

## 2025-05-29 RX ORDER — CITALOPRAM 20 MG/1
40 TABLET ORAL DAILY
Status: DISCONTINUED | OUTPATIENT
Start: 2025-05-29 | End: 2025-05-30 | Stop reason: HOSPADM

## 2025-05-29 RX ORDER — KETOROLAC TROMETHAMINE 30 MG/ML
15 INJECTION, SOLUTION INTRAMUSCULAR; INTRAVENOUS EVERY 6 HOURS SCHEDULED
Status: DISCONTINUED | OUTPATIENT
Start: 2025-05-29 | End: 2025-05-30 | Stop reason: HOSPADM

## 2025-05-29 RX ORDER — GABAPENTIN 100 MG/1
100 CAPSULE ORAL NIGHTLY
Status: DISCONTINUED | OUTPATIENT
Start: 2025-05-29 | End: 2025-05-30 | Stop reason: HOSPADM

## 2025-05-29 RX ADMIN — OXYCODONE HYDROCHLORIDE 10 MG: 5 SOLUTION ORAL at 18:39

## 2025-05-29 RX ADMIN — KETOROLAC TROMETHAMINE 15 MG: 30 INJECTION, SOLUTION INTRAMUSCULAR at 12:43

## 2025-05-29 RX ADMIN — HYDROMORPHONE HYDROCHLORIDE 0.5 MG: 1 INJECTION, SOLUTION INTRAMUSCULAR; INTRAVENOUS; SUBCUTANEOUS at 15:53

## 2025-05-29 RX ADMIN — ENOXAPARIN SODIUM 40 MG: 40 INJECTION SUBCUTANEOUS at 21:54

## 2025-05-29 RX ADMIN — KETOROLAC TROMETHAMINE 15 MG: 30 INJECTION, SOLUTION INTRAMUSCULAR at 00:22

## 2025-05-29 RX ADMIN — OXYCODONE HYDROCHLORIDE 10 MG: 5 SOLUTION ORAL at 10:32

## 2025-05-29 RX ADMIN — HYDROMORPHONE HYDROCHLORIDE 0.4 MG: 1 INJECTION, SOLUTION INTRAMUSCULAR; INTRAVENOUS; SUBCUTANEOUS at 09:26

## 2025-05-29 RX ADMIN — ACETAMINOPHEN 975 MG: 325 TABLET ORAL at 14:52

## 2025-05-29 RX ADMIN — AMPICILLIN SODIUM AND SULBACTAM SODIUM 3 G: 2; 1 INJECTION, POWDER, FOR SOLUTION INTRAMUSCULAR; INTRAVENOUS at 00:22

## 2025-05-29 RX ADMIN — OXYCODONE HYDROCHLORIDE 10 MG: 5 SOLUTION ORAL at 01:12

## 2025-05-29 RX ADMIN — GABAPENTIN 100 MG: 100 CAPSULE ORAL at 21:54

## 2025-05-29 RX ADMIN — KETOROLAC TROMETHAMINE 15 MG: 30 INJECTION, SOLUTION INTRAMUSCULAR at 18:39

## 2025-05-29 RX ADMIN — TOBRAMYCIN AND DEXAMETHASONE 1 DROP: 1; 3 SUSPENSION/ DROPS OPHTHALMIC at 01:58

## 2025-05-29 RX ADMIN — HYDROMORPHONE HYDROCHLORIDE 0.5 MG: 1 INJECTION, SOLUTION INTRAMUSCULAR; INTRAVENOUS; SUBCUTANEOUS at 20:10

## 2025-05-29 RX ADMIN — AMPICILLIN SODIUM AND SULBACTAM SODIUM 3 G: 2; 1 INJECTION, POWDER, FOR SOLUTION INTRAMUSCULAR; INTRAVENOUS at 18:39

## 2025-05-29 RX ADMIN — CITALOPRAM HYDROBROMIDE 40 MG: 20 TABLET ORAL at 21:54

## 2025-05-29 RX ADMIN — HYDROMORPHONE HYDROCHLORIDE 0.5 MG: 1 INJECTION, SOLUTION INTRAMUSCULAR; INTRAVENOUS; SUBCUTANEOUS at 12:43

## 2025-05-29 RX ADMIN — SENNOSIDES 8.6 MG: 8.6 TABLET, FILM COATED ORAL at 21:54

## 2025-05-29 RX ADMIN — HYDROMORPHONE HYDROCHLORIDE 0.2 MG: 0.2 INJECTION, SOLUTION INTRAMUSCULAR; INTRAVENOUS; SUBCUTANEOUS at 03:05

## 2025-05-29 RX ADMIN — TOBRAMYCIN AND DEXAMETHASONE 1 DROP: 1; 3 SUSPENSION/ DROPS OPHTHALMIC at 09:31

## 2025-05-29 RX ADMIN — AMPICILLIN SODIUM AND SULBACTAM SODIUM 3 G: 2; 1 INJECTION, POWDER, FOR SOLUTION INTRAMUSCULAR; INTRAVENOUS at 05:23

## 2025-05-29 RX ADMIN — TOBRAMYCIN AND DEXAMETHASONE 1 DROP: 1; 3 SUSPENSION/ DROPS OPHTHALMIC at 18:43

## 2025-05-29 RX ADMIN — HYDROMORPHONE HYDROCHLORIDE 0.2 MG: 0.2 INJECTION, SOLUTION INTRAMUSCULAR; INTRAVENOUS; SUBCUTANEOUS at 00:34

## 2025-05-29 RX ADMIN — ACETAMINOPHEN 1000 MG: 160 SOLUTION ORAL at 21:54

## 2025-05-29 RX ADMIN — HYDROMORPHONE HYDROCHLORIDE 0.2 MG: 0.2 INJECTION, SOLUTION INTRAMUSCULAR; INTRAVENOUS; SUBCUTANEOUS at 06:59

## 2025-05-29 RX ADMIN — KETOROLAC TROMETHAMINE 15 MG: 30 INJECTION, SOLUTION INTRAMUSCULAR at 05:22

## 2025-05-29 RX ADMIN — CYCLOBENZAPRINE HYDROCHLORIDE 5 MG: 5 TABLET, FILM COATED ORAL at 14:52

## 2025-05-29 RX ADMIN — AMPICILLIN SODIUM AND SULBACTAM SODIUM 3 G: 2; 1 INJECTION, POWDER, FOR SOLUTION INTRAMUSCULAR; INTRAVENOUS at 12:43

## 2025-05-29 RX ADMIN — CYCLOBENZAPRINE HYDROCHLORIDE 5 MG: 5 TABLET, FILM COATED ORAL at 09:26

## 2025-05-29 RX ADMIN — ACETAMINOPHEN 650 MG: 650 SOLUTION ORAL at 00:22

## 2025-05-29 RX ADMIN — OXYCODONE HYDROCHLORIDE 10 MG: 5 SOLUTION ORAL at 05:22

## 2025-05-29 RX ADMIN — ACETAMINOPHEN 650 MG: 650 SOLUTION ORAL at 05:22

## 2025-05-29 RX ADMIN — DEXAMETHASONE SODIUM PHOSPHATE 8 MG: 4 INJECTION, SOLUTION INTRAMUSCULAR; INTRAVENOUS at 05:22

## 2025-05-29 RX ADMIN — OXYCODONE HYDROCHLORIDE 10 MG: 5 SOLUTION ORAL at 22:07

## 2025-05-29 RX ADMIN — OXYCODONE HYDROCHLORIDE 10 MG: 5 SOLUTION ORAL at 14:52

## 2025-05-29 RX ADMIN — CYCLOBENZAPRINE HYDROCHLORIDE 5 MG: 5 TABLET, FILM COATED ORAL at 21:54

## 2025-05-29 RX ADMIN — HYDROMORPHONE HYDROCHLORIDE 0.5 MG: 1 INJECTION, SOLUTION INTRAMUSCULAR; INTRAVENOUS; SUBCUTANEOUS at 23:13

## 2025-05-29 ASSESSMENT — COGNITIVE AND FUNCTIONAL STATUS - GENERAL
DRESSING REGULAR UPPER BODY CLOTHING: A LITTLE
MOVING FROM LYING ON BACK TO SITTING ON SIDE OF FLAT BED WITH BEDRAILS: A LITTLE
DRESSING REGULAR LOWER BODY CLOTHING: A LITTLE
STANDING UP FROM CHAIR USING ARMS: A LITTLE
HELP NEEDED FOR BATHING: A LITTLE
STANDING UP FROM CHAIR USING ARMS: A LITTLE
HELP NEEDED FOR BATHING: A LITTLE
CLIMB 3 TO 5 STEPS WITH RAILING: A LITTLE
MOBILITY SCORE: 20
STANDING UP FROM CHAIR USING ARMS: A LITTLE
PERSONAL GROOMING: A LITTLE
STANDING UP FROM CHAIR USING ARMS: A LITTLE
HELP NEEDED FOR BATHING: A LITTLE
MOVING TO AND FROM BED TO CHAIR: A LITTLE
DRESSING REGULAR UPPER BODY CLOTHING: A LITTLE
MOVING TO AND FROM BED TO CHAIR: A LITTLE
DRESSING REGULAR LOWER BODY CLOTHING: A LITTLE
STANDING UP FROM CHAIR USING ARMS: A LITTLE
PERSONAL GROOMING: A LITTLE
DAILY ACTIVITIY SCORE: 19
MOVING TO AND FROM BED TO CHAIR: A LITTLE
MOVING TO AND FROM BED TO CHAIR: A LITTLE
TOILETING: A LITTLE
CLIMB 3 TO 5 STEPS WITH RAILING: A LITTLE
CLIMB 3 TO 5 STEPS WITH RAILING: A LITTLE
DRESSING REGULAR LOWER BODY CLOTHING: A LITTLE
DRESSING REGULAR LOWER BODY CLOTHING: A LITTLE
DRESSING REGULAR UPPER BODY CLOTHING: A LITTLE
DRESSING REGULAR LOWER BODY CLOTHING: A LITTLE
PERSONAL GROOMING: A LITTLE
MOVING FROM LYING ON BACK TO SITTING ON SIDE OF FLAT BED WITH BEDRAILS: A LITTLE
TOILETING: A LITTLE
DRESSING REGULAR UPPER BODY CLOTHING: A LITTLE
STANDING UP FROM CHAIR USING ARMS: A LITTLE
HELP NEEDED FOR BATHING: A LITTLE
WALKING IN HOSPITAL ROOM: A LITTLE
TOILETING: A LITTLE
MOVING TO AND FROM BED TO CHAIR: A LITTLE
TOILETING: A LITTLE
WALKING IN HOSPITAL ROOM: A LITTLE
WALKING IN HOSPITAL ROOM: A LITTLE
TURNING FROM BACK TO SIDE WHILE IN FLAT BAD: A LITTLE
DAILY ACTIVITIY SCORE: 19
TOILETING: A LITTLE
DRESSING REGULAR UPPER BODY CLOTHING: A LITTLE
TOILETING: A LITTLE
HELP NEEDED FOR BATHING: A LITTLE
TURNING FROM BACK TO SIDE WHILE IN FLAT BAD: A LITTLE
MOBILITY SCORE: 18
CLIMB 3 TO 5 STEPS WITH RAILING: A LITTLE
PERSONAL GROOMING: A LITTLE
TURNING FROM BACK TO SIDE WHILE IN FLAT BAD: A LITTLE
WALKING IN HOSPITAL ROOM: A LITTLE
TURNING FROM BACK TO SIDE WHILE IN FLAT BAD: A LITTLE
DRESSING REGULAR LOWER BODY CLOTHING: A LITTLE
MOVING FROM LYING ON BACK TO SITTING ON SIDE OF FLAT BED WITH BEDRAILS: A LITTLE
HELP NEEDED FOR BATHING: A LITTLE
CLIMB 3 TO 5 STEPS WITH RAILING: A LITTLE
PERSONAL GROOMING: A LITTLE
WALKING IN HOSPITAL ROOM: A LITTLE
MOBILITY SCORE: 18
MOVING FROM LYING ON BACK TO SITTING ON SIDE OF FLAT BED WITH BEDRAILS: A LITTLE
DAILY ACTIVITIY SCORE: 21
DAILY ACTIVITIY SCORE: 19
CLIMB 3 TO 5 STEPS WITH RAILING: A LITTLE
MOVING TO AND FROM BED TO CHAIR: A LITTLE
TURNING FROM BACK TO SIDE WHILE IN FLAT BAD: A LITTLE
WALKING IN HOSPITAL ROOM: A LITTLE
MOBILITY SCORE: 18
MOVING FROM LYING ON BACK TO SITTING ON SIDE OF FLAT BED WITH BEDRAILS: A LITTLE

## 2025-05-29 ASSESSMENT — PAIN DESCRIPTION - ORIENTATION
ORIENTATION: LEFT

## 2025-05-29 ASSESSMENT — PAIN SCALES - GENERAL
PAINLEVEL_OUTOF10: 6
PAINLEVEL_OUTOF10: 5 - MODERATE PAIN
PAINLEVEL_OUTOF10: 8
PAINLEVEL_OUTOF10: 4
PAINLEVEL_OUTOF10: 10 - WORST POSSIBLE PAIN
PAINLEVEL_OUTOF10: 8
PAINLEVEL_OUTOF10: 7
PAINLEVEL_OUTOF10: 9
PAINLEVEL_OUTOF10: 10 - WORST POSSIBLE PAIN
PAINLEVEL_OUTOF10: 9
PAINLEVEL_OUTOF10: 7
PAINLEVEL_OUTOF10: 7
PAINLEVEL_OUTOF10: 8

## 2025-05-29 ASSESSMENT — PAIN - FUNCTIONAL ASSESSMENT
PAIN_FUNCTIONAL_ASSESSMENT: 0-10
PAIN_FUNCTIONAL_ASSESSMENT: UNABLE TO SELF-REPORT
PAIN_FUNCTIONAL_ASSESSMENT: 0-10
PAIN_FUNCTIONAL_ASSESSMENT: UNABLE TO SELF-REPORT
PAIN_FUNCTIONAL_ASSESSMENT: UNABLE TO SELF-REPORT
PAIN_FUNCTIONAL_ASSESSMENT: 0-10

## 2025-05-29 ASSESSMENT — PAIN DESCRIPTION - DESCRIPTORS: DESCRIPTORS: ACHING

## 2025-05-29 ASSESSMENT — PAIN DESCRIPTION - LOCATION
LOCATION: JAW

## 2025-05-29 NOTE — CARE PLAN
Problem: Pain - Adult  Goal: Verbalizes/displays adequate comfort level or baseline comfort level  Outcome: Progressing     Problem: Safety - Adult  Goal: Free from fall injury  Outcome: Progressing     Problem: Discharge Planning  Goal: Discharge to home or other facility with appropriate resources  Outcome: Progressing     Problem: Chronic Conditions and Co-morbidities  Goal: Patient's chronic conditions and co-morbidity symptoms are monitored and maintained or improved  Outcome: Progressing     Problem: Nutrition  Goal: Nutrient intake appropriate for maintaining nutritional needs  Outcome: Progressing     Problem: Diabetes  Goal: Achieve decreasing blood glucose levels by end of shift  Outcome: Progressing  Goal: Increase stability of blood glucose readings by end of shift  Outcome: Progressing  Goal: Decrease in ketones present in urine by end of shift  Outcome: Progressing  Goal: Maintain electrolyte levels within acceptable range throughout shift  Outcome: Progressing  Goal: Maintain glucose levels >70mg/dl to <250mg/dl throughout shift  Outcome: Progressing  Goal: No changes in neurological exam by end of shift  Outcome: Progressing  Goal: Learn about and adhere to nutrition recommendations by end of shift  Outcome: Progressing  Goal: Vital signs within normal range for age by end of shift  Outcome: Progressing  Goal: Increase self care and/or family involovement by end of shift  Outcome: Progressing  Goal: Receive DSME education by end of shift  Outcome: Progressing     Problem: Pain  Goal: Takes deep breaths with improved pain control throughout the shift  Outcome: Progressing  Goal: Turns in bed with improved pain control throughout the shift  Outcome: Progressing  Goal: Walks with improved pain control throughout the shift  Outcome: Progressing  Goal: Performs ADL's with improved pain control throughout shift  Outcome: Progressing  Goal: Participates in PT with improved pain control throughout the  shift  Outcome: Progressing  Goal: Free from opioid side effects throughout the shift  Outcome: Progressing  Goal: Free from acute confusion related to pain meds throughout the shift  Outcome: Progressing     Problem: Fall/Injury  Goal: Not fall by end of shift  Outcome: Progressing  Goal: Be free from injury by end of the shift  Outcome: Progressing  Goal: Verbalize understanding of personal risk factors for fall in the hospital  Outcome: Progressing  Goal: Verbalize understanding of risk factor reduction measures to prevent injury from fall in the home  Outcome: Progressing  Goal: Use assistive devices by end of the shift  Outcome: Progressing  Goal: Pace activities to prevent fatigue by end of the shift  Outcome: Progressing   The patient's goals for the shift include      The clinical goals for the shift include Patient will remain hemodynamically stable.    Over the shift, the patient did not make progress toward the following goals.

## 2025-05-29 NOTE — CARE PLAN
The patient's goals for the shift include      The clinical goals for the shift include Patient will remain hemodynamically stable.      Problem: Pain - Adult  Goal: Verbalizes/displays adequate comfort level or baseline comfort level  Outcome: Progressing     Problem: Safety - Adult  Goal: Free from fall injury  Outcome: Progressing

## 2025-05-29 NOTE — PROGRESS NOTES
Nereida Conway is a 60 y.o. female on day 1 of admission presenting with Arthralgia of left temporomandibular joint.    Subjective   Patient reports 10/10 left sided jaw pain that has not been resolved or reduced with current pain regimen. She also reports that she has pain at baseline that she usually takes tramadol for at home. She has been icing, on the left side and reports  not getting much sleep overnight due to her pain. She is otherwise lying in bed comfortably. She is drinking liquids appropriately and reports not passing gas, but is urinating adequately.        Objective     Physical Exam  HENT:      Head: Normocephalic.      Right Ear: Ear canal and external ear normal.      Left Ear: Ear canal and external ear normal.      Nose: Nose normal.      Mouth/Throat:      Mouth: Mucous membranes are moist.      Comments: Occlusion stable b/l  Eyes:      Extraocular Movements: Extraocular movements intact.      Conjunctiva/sclera: Conjunctivae normal.      Pupils: Pupils are equal, round, and reactive to light.      Comments: Right cheek redness    Neck:      Comments: Left preauricular incision intact with mild edema and telfa island in place without strikethrough.     Mild erythema and edema to left neck. Left neck incision intact, hemostatic with telfa island dressing in place without strikethrough.   Cardiovascular:      Rate and Rhythm: Normal rate and regular rhythm.      Pulses: Normal pulses.   Pulmonary:      Effort: Pulmonary effort is normal.   Abdominal:      General: Bowel sounds are normal.      Palpations: Abdomen is soft.   Musculoskeletal:         General: Normal range of motion.      Cervical back: Normal range of motion and neck supple.   Skin:     General: Skin is warm.      Capillary Refill: Capillary refill takes less than 2 seconds.   Neurological:      General: No focal deficit present.      Mental Status: She is alert and oriented to person, place, and time.      Comments: CN V2  hypoesthesia   CN VII intact   Psychiatric:         Mood and Affect: Mood normal.      Comments: Tearful          Last Recorded Vitals  Heart Rate:  [72-88]   Temp:  [36 °C (96.8 °F)-36.8 °C (98.2 °F)]   Resp:  [12-16]   BP: (102-146)/(65-87)   SpO2:  [94 %-100 %]      Intake/Output last 3 Shifts:  I/O last 3 completed shifts:  In: 1800 (29.5 mL/kg) [I.V.:1700 (27.9 mL/kg); IV Piggyback:100]  Out: 60 (1 mL/kg) [Blood:60]  Weight: 61 kg   Unmeasured urine     Relevant Results  CT maxillofacial bones wo IV contrast  Result Date: 5/28/2025  Impression: Interval postoperative changes left temporomandibular arthroplasty and condylectomy, with expected immediate postsurgical findings as described above. Prior postoperative changes left uncinectomy and turbinectomy. Minimal paranasal sinus mucosal thickening.   Signed by: John Rodas 5/28/2025 2:38 PM Dictation workstation:   [id number]    Scheduled medications  Scheduled Medications[1]  Continuous medications  Continuous Medications[2]  PRN medications  PRN Medications[3]     Assessment & Plan  Arthralgia of left temporomandibular joint    59 yo F POD 1 s/p L TMJ replacement. She is currently stable but continues to have pain acute vs. chronic related to surgery in addition to her chronic use of tramadol for arthritic pain. Pain regimen adjusted and chronic pain consulted to advise on managing pain. She has tolerated clear liquids overnight, advanced to soft this morning.     Feed - advanced to soft  Analgesia/sedation/anxiolytic -  scheduled tylenol, oxycodone PRN, dilaudid breakthrough pain,  lubricating and antibacterial eye drops in L eye as ordered  Volume - LR 75mL/hr  Oral surgery -  bedside suction and red rubber catheter  Respiratory - ambulation as tolerated  Infectious disease - unasyn 3g  Transfusion - if HbG count <7, consider transfusion  Embolic prophylaxis - SCDs and sub-q lovenox  Tubes/lines/drains - maintain peripheral IV  Special - plan for  dispo to home 5/30 once pain is managed     Reddy Rogers, CHRIS  [facility] PGY-2  Team Pager: [phone]  Available on Haiku         [1] acetaminophen, 650 mg, oral, q6h STEPHANIE   Or  acetaminophen, 650 mg, oral, q6h STEPHANIE  ampicillin-sulbactam, 3 g, intravenous, q6h STEPHANIE  cyclobenzaprine, 5 mg, oral, TID  dexAMETHasone, 8 mg, intravenous, q8h STEPHANIE  enoxaparin, 40 mg, subcutaneous, q24h  tobramycin-dexamethasone, 1 drop, Left Eye, q8h  [2] lactated Ringer's, 75 mL/hr, Last Rate: 75 mL/hr (05/29/25 5805)  [3] PRN medications: HYDROmorphone, lubricating eye drops, oxyCODONE, oxyCODONE, sodium chloride

## 2025-05-29 NOTE — SIGNIFICANT EVENT
Adjusted pain regimen after pain management consult:     - Scheduled tylenol 975 mg q8h   - Oxycodone 5 mg and 10 mg  PRN  - Dilaudid 0.5 mg breakthrough pain  - Ketorlac 15 mg q6h (no mor than 5 days)     Reddy Rogers, DMD

## 2025-05-30 VITALS
WEIGHT: 134.48 LBS | OXYGEN SATURATION: 97 % | RESPIRATION RATE: 17 BRPM | DIASTOLIC BLOOD PRESSURE: 72 MMHG | BODY MASS INDEX: 26.4 KG/M2 | TEMPERATURE: 97.2 F | HEIGHT: 60 IN | HEART RATE: 66 BPM | SYSTOLIC BLOOD PRESSURE: 119 MMHG

## 2025-05-30 PROCEDURE — 7100000011 HC EXTENDED STAY RECOVERY HOURLY - NURSING UNIT

## 2025-05-30 PROCEDURE — 2500000004 HC RX 250 GENERAL PHARMACY W/ HCPCS (ALT 636 FOR OP/ED): Mod: JZ

## 2025-05-30 PROCEDURE — 2500000001 HC RX 250 WO HCPCS SELF ADMINISTERED DRUGS (ALT 637 FOR MEDICARE OP)

## 2025-05-30 PROCEDURE — 2500000004 HC RX 250 GENERAL PHARMACY W/ HCPCS (ALT 636 FOR OP/ED)

## 2025-05-30 RX ORDER — ACETAMINOPHEN 160 MG/5ML
650 LIQUID ORAL EVERY 4 HOURS PRN
Qty: 120 ML | Refills: 0 | Status: SHIPPED | OUTPATIENT
Start: 2025-05-30 | End: 2025-06-06

## 2025-05-30 RX ORDER — POLYETHYLENE GLYCOL 3350 17 G/17G
17 POWDER, FOR SOLUTION ORAL DAILY
Qty: 1 PACKET | Refills: 0 | Status: SHIPPED | OUTPATIENT
Start: 2025-05-30 | End: 2025-06-02

## 2025-05-30 RX ORDER — OXYCODONE HYDROCHLORIDE 5 MG/1
5 TABLET ORAL EVERY 4 HOURS PRN
Qty: 15 TABLET | Refills: 0 | Status: SHIPPED | OUTPATIENT
Start: 2025-05-30 | End: 2025-06-03

## 2025-05-30 RX ORDER — BACITRACIN ZINC 500 UNIT/G
OINTMENT (GRAM) TOPICAL 2 TIMES DAILY
Qty: 14 G | Refills: 0 | Status: SHIPPED | OUTPATIENT
Start: 2025-05-30

## 2025-05-30 RX ORDER — OXYCODONE HYDROCHLORIDE 10 MG/1
10 TABLET ORAL EVERY 4 HOURS PRN
Qty: 4 TABLET | Refills: 0 | Status: SHIPPED | OUTPATIENT
Start: 2025-05-30 | End: 2025-05-31

## 2025-05-30 RX ORDER — HYDROXYZINE PAMOATE 25 MG/1
25 CAPSULE ORAL 3 TIMES DAILY PRN
Qty: 30 CAPSULE | Refills: 0 | Status: SHIPPED | OUTPATIENT
Start: 2025-05-30 | End: 2025-06-02

## 2025-05-30 RX ORDER — CEPHALEXIN 500 MG/1
500 CAPSULE ORAL 2 TIMES DAILY
Qty: 14 CAPSULE | Refills: 0 | Status: SHIPPED | OUTPATIENT
Start: 2025-05-30 | End: 2025-06-06

## 2025-05-30 RX ORDER — TRIPROLIDINE/PSEUDOEPHEDRINE 2.5MG-60MG
200 TABLET ORAL EVERY 6 HOURS
Qty: 280 ML | Refills: 0 | Status: SHIPPED | OUTPATIENT
Start: 2025-05-30 | End: 2025-06-06

## 2025-05-30 RX ADMIN — CITALOPRAM HYDROBROMIDE 40 MG: 20 TABLET ORAL at 09:42

## 2025-05-30 RX ADMIN — AMPICILLIN SODIUM AND SULBACTAM SODIUM 3 G: 2; 1 INJECTION, POWDER, FOR SOLUTION INTRAMUSCULAR; INTRAVENOUS at 00:54

## 2025-05-30 RX ADMIN — AMPICILLIN SODIUM AND SULBACTAM SODIUM 3 G: 2; 1 INJECTION, POWDER, FOR SOLUTION INTRAMUSCULAR; INTRAVENOUS at 06:46

## 2025-05-30 RX ADMIN — OXYCODONE HYDROCHLORIDE 10 MG: 5 SOLUTION ORAL at 11:29

## 2025-05-30 RX ADMIN — CYCLOBENZAPRINE HYDROCHLORIDE 5 MG: 5 TABLET, FILM COATED ORAL at 14:01

## 2025-05-30 RX ADMIN — OXYCODONE HYDROCHLORIDE 10 MG: 5 SOLUTION ORAL at 07:12

## 2025-05-30 RX ADMIN — HYDROMORPHONE HYDROCHLORIDE 0.5 MG: 1 INJECTION, SOLUTION INTRAMUSCULAR; INTRAVENOUS; SUBCUTANEOUS at 04:18

## 2025-05-30 RX ADMIN — AMPICILLIN SODIUM AND SULBACTAM SODIUM 3 G: 2; 1 INJECTION, POWDER, FOR SOLUTION INTRAMUSCULAR; INTRAVENOUS at 11:31

## 2025-05-30 RX ADMIN — KETOROLAC TROMETHAMINE 15 MG: 30 INJECTION, SOLUTION INTRAMUSCULAR at 06:46

## 2025-05-30 RX ADMIN — TOBRAMYCIN AND DEXAMETHASONE 1 DROP: 1; 3 SUSPENSION/ DROPS OPHTHALMIC at 09:47

## 2025-05-30 RX ADMIN — CYCLOBENZAPRINE HYDROCHLORIDE 5 MG: 5 TABLET, FILM COATED ORAL at 09:41

## 2025-05-30 RX ADMIN — ACETAMINOPHEN 1000 MG: 160 SOLUTION ORAL at 06:46

## 2025-05-30 RX ADMIN — ACETAMINOPHEN 1000 MG: 160 SOLUTION ORAL at 13:57

## 2025-05-30 RX ADMIN — OXYCODONE HYDROCHLORIDE 10 MG: 5 SOLUTION ORAL at 03:19

## 2025-05-30 RX ADMIN — KETOROLAC TROMETHAMINE 15 MG: 30 INJECTION, SOLUTION INTRAMUSCULAR at 11:28

## 2025-05-30 RX ADMIN — TOBRAMYCIN AND DEXAMETHASONE 1 DROP: 1; 3 SUSPENSION/ DROPS OPHTHALMIC at 01:04

## 2025-05-30 RX ADMIN — KETOROLAC TROMETHAMINE 15 MG: 30 INJECTION, SOLUTION INTRAMUSCULAR at 00:54

## 2025-05-30 ASSESSMENT — COGNITIVE AND FUNCTIONAL STATUS - GENERAL
DRESSING REGULAR LOWER BODY CLOTHING: A LITTLE
HELP NEEDED FOR BATHING: A LITTLE
DRESSING REGULAR UPPER BODY CLOTHING: A LITTLE
STANDING UP FROM CHAIR USING ARMS: A LITTLE
HELP NEEDED FOR BATHING: A LITTLE
DRESSING REGULAR UPPER BODY CLOTHING: A LITTLE
DRESSING REGULAR UPPER BODY CLOTHING: A LITTLE
CLIMB 3 TO 5 STEPS WITH RAILING: A LITTLE
TURNING FROM BACK TO SIDE WHILE IN FLAT BAD: A LITTLE
TOILETING: A LITTLE
MOVING FROM LYING ON BACK TO SITTING ON SIDE OF FLAT BED WITH BEDRAILS: A LITTLE
TURNING FROM BACK TO SIDE WHILE IN FLAT BAD: A LITTLE
MOVING TO AND FROM BED TO CHAIR: A LITTLE
DRESSING REGULAR UPPER BODY CLOTHING: A LITTLE
TURNING FROM BACK TO SIDE WHILE IN FLAT BAD: A LITTLE
MOVING FROM LYING ON BACK TO SITTING ON SIDE OF FLAT BED WITH BEDRAILS: A LITTLE
DRESSING REGULAR UPPER BODY CLOTHING: A LITTLE
HELP NEEDED FOR BATHING: A LITTLE
MOVING FROM LYING ON BACK TO SITTING ON SIDE OF FLAT BED WITH BEDRAILS: A LITTLE
PERSONAL GROOMING: A LITTLE
HELP NEEDED FOR BATHING: A LITTLE
WALKING IN HOSPITAL ROOM: A LITTLE
MOBILITY SCORE: 18
CLIMB 3 TO 5 STEPS WITH RAILING: A LITTLE
STANDING UP FROM CHAIR USING ARMS: A LITTLE
MOVING FROM LYING ON BACK TO SITTING ON SIDE OF FLAT BED WITH BEDRAILS: A LITTLE
MOVING TO AND FROM BED TO CHAIR: A LITTLE
DRESSING REGULAR UPPER BODY CLOTHING: A LITTLE
MOBILITY SCORE: 18
STANDING UP FROM CHAIR USING ARMS: A LITTLE
MOVING TO AND FROM BED TO CHAIR: A LITTLE
TURNING FROM BACK TO SIDE WHILE IN FLAT BAD: A LITTLE
DAILY ACTIVITIY SCORE: 19
TOILETING: A LITTLE
DRESSING REGULAR UPPER BODY CLOTHING: A LITTLE
PERSONAL GROOMING: A LITTLE
DAILY ACTIVITIY SCORE: 19
CLIMB 3 TO 5 STEPS WITH RAILING: A LITTLE
CLIMB 3 TO 5 STEPS WITH RAILING: A LITTLE
TOILETING: A LITTLE
STANDING UP FROM CHAIR USING ARMS: A LITTLE
MOBILITY SCORE: 18
DAILY ACTIVITIY SCORE: 19
PERSONAL GROOMING: A LITTLE
DRESSING REGULAR LOWER BODY CLOTHING: A LITTLE
TOILETING: A LITTLE
WALKING IN HOSPITAL ROOM: A LITTLE
CLIMB 3 TO 5 STEPS WITH RAILING: A LITTLE
DRESSING REGULAR LOWER BODY CLOTHING: A LITTLE
MOVING TO AND FROM BED TO CHAIR: A LITTLE
WALKING IN HOSPITAL ROOM: A LITTLE
CLIMB 3 TO 5 STEPS WITH RAILING: A LITTLE
MOVING FROM LYING ON BACK TO SITTING ON SIDE OF FLAT BED WITH BEDRAILS: A LITTLE
TURNING FROM BACK TO SIDE WHILE IN FLAT BAD: A LITTLE
DAILY ACTIVITIY SCORE: 19
PERSONAL GROOMING: A LITTLE
MOBILITY SCORE: 18
MOVING TO AND FROM BED TO CHAIR: A LITTLE
TOILETING: A LITTLE
HELP NEEDED FOR BATHING: A LITTLE
DRESSING REGULAR LOWER BODY CLOTHING: A LITTLE
MOVING TO AND FROM BED TO CHAIR: A LITTLE
DRESSING REGULAR LOWER BODY CLOTHING: A LITTLE
STANDING UP FROM CHAIR USING ARMS: A LITTLE
TOILETING: A LITTLE
PERSONAL GROOMING: A LITTLE
WALKING IN HOSPITAL ROOM: A LITTLE
STANDING UP FROM CHAIR USING ARMS: A LITTLE
DRESSING REGULAR LOWER BODY CLOTHING: A LITTLE
HELP NEEDED FOR BATHING: A LITTLE
HELP NEEDED FOR BATHING: A LITTLE
MOVING FROM LYING ON BACK TO SITTING ON SIDE OF FLAT BED WITH BEDRAILS: A LITTLE
DAILY ACTIVITIY SCORE: 19
WALKING IN HOSPITAL ROOM: A LITTLE
PERSONAL GROOMING: A LITTLE
WALKING IN HOSPITAL ROOM: A LITTLE
MOBILITY SCORE: 18
MOVING FROM LYING ON BACK TO SITTING ON SIDE OF FLAT BED WITH BEDRAILS: A LITTLE
TURNING FROM BACK TO SIDE WHILE IN FLAT BAD: A LITTLE
CLIMB 3 TO 5 STEPS WITH RAILING: A LITTLE
DRESSING REGULAR LOWER BODY CLOTHING: A LITTLE
TOILETING: A LITTLE
PERSONAL GROOMING: A LITTLE
DAILY ACTIVITIY SCORE: 19
MOVING TO AND FROM BED TO CHAIR: A LITTLE
WALKING IN HOSPITAL ROOM: A LITTLE
TURNING FROM BACK TO SIDE WHILE IN FLAT BAD: A LITTLE
MOBILITY SCORE: 18
STANDING UP FROM CHAIR USING ARMS: A LITTLE

## 2025-05-30 ASSESSMENT — PAIN - FUNCTIONAL ASSESSMENT
PAIN_FUNCTIONAL_ASSESSMENT: 0-10
PAIN_FUNCTIONAL_ASSESSMENT: UNABLE TO SELF-REPORT
PAIN_FUNCTIONAL_ASSESSMENT: 0-10

## 2025-05-30 ASSESSMENT — PAIN DESCRIPTION - ORIENTATION
ORIENTATION: LEFT
ORIENTATION: LEFT

## 2025-05-30 ASSESSMENT — PAIN SCALES - GENERAL
PAINLEVEL_OUTOF10: 10 - WORST POSSIBLE PAIN
PAINLEVEL_OUTOF10: 0 - NO PAIN
PAINLEVEL_OUTOF10: 8
PAINLEVEL_OUTOF10: 0 - NO PAIN
PAINLEVEL_OUTOF10: 1
PAINLEVEL_OUTOF10: 7

## 2025-05-30 ASSESSMENT — PAIN DESCRIPTION - LOCATION
LOCATION: JAW
LOCATION: JAW

## 2025-05-30 NOTE — DISCHARGE SUMMARY
Discharge Diagnosis  Arthralgia of left temporomandibular joint    Issues Requiring Follow-Up  Excessive pain and swelling    Test Results Pending At Discharge  Pending Labs       Order Current Status    Surgical Pathology Exam In process            Hospital Course     5/28 - Patient presented to [facility] to undergo left TMJ replacement with a stock joint in the OR with Dr. Goodrich. Patient reported 10/10 pain later that PM. She was lying comfortably in bed with telfa island dressing in place. Had minimal L CNVII marginal mandibular branch weakness, minimal L CNV1/2/3 hypoesthesia, worst in CNV3. CNV and CNVII otherwise intact.  - L preauricular and submandibular incisions intact and hemostatic  - Mild conjunctival injection and periorbital erythema in L eye    5/29 - Patient reports 10/10 left sided jaw pain that has not been resolved or reduced with current pain regimen. She also reports that she has pain at baseline that she usually takes tramadol for at home. She has been icing, on the left side and reports  not getting much sleep overnight due to her pain. She is otherwise lying in bed comfortably. She is drinking liquids appropriately and reports not passing gas, but is urinating adequately.  Left preauricular incision intact with mild edema and telfa island in place without strikethrough.        5/30 - Patient reports pain control has been improved with current dilaudid and oxy 10 overnight. Was lying comfortably in bed upon examination this AM but was stressed due to current family issues going.          Pertinent Physical Exam At Time of Discharge  Physical Exam  HENT:      Head: Normocephalic.      Right Ear: Ear canal and external ear normal.      Left Ear: Ear canal and external ear normal.      Nose: Nose normal.      Mouth/Throat:      Mouth: Mucous membranes are moist.      Comments: Occlusion stable b/l  Eyes:      Extraocular Movements: Extraocular movements intact.      Conjunctiva/sclera:  Conjunctivae normal.      Pupils: Pupils are equal, round, and reactive to light.      Comments: Left cheek redness    Neck:      Comments: Left preauricular incision intact with mild edema    Mild erythema and edema to left neck. Left neck incision intact, hemostatic (c/d/I)  Cardiovascular:      Rate and Rhythm: Normal rate and regular rhythm.      Pulses: Normal pulses.   Pulmonary:      Effort: Pulmonary effort is normal.   Abdominal:      General: Bowel sounds are normal.      Palpations: Abdomen is soft.   Musculoskeletal:         General: Normal range of motion.      Cervical back: Normal range of motion and neck supple.   Skin:     General: Skin is warm.      Capillary Refill: Capillary refill takes less than 2 seconds.   Neurological:      General: No focal deficit present.      Mental Status: She is alert and oriented to person, place, and time.      Comments: L CN V2 hypoesthesia   CN VII intact   Psychiatric:         Mood and Affect: Mood normal.      Comments: Tearful       Home Medications     Medication List      START taking these medications     acetaminophen 160 mg/5 mL liquid; Commonly known as: Tylenol; Take 20.3   mL (650 mg) by mouth every 4 hours if needed for mild pain (1 - 3) for up   to 7 days.   bacitracin 500 unit/gram ointment; Apply topically 2 times a day.   cephalexin 500 mg capsule; Commonly known as: Keflex; Take 1 capsule   (500 mg) by mouth 2 times a day for 7 days.   hydrOXYzine pamoate 25 mg capsule; Commonly known as: Vistaril; Take 1   capsule (25 mg) by mouth 3 times a day as needed for itching for up to 3   days. Take a tab nightly PRN   ibuprofen 100 mg/5 mL suspension; Take 10 mL (200 mg) by mouth every 6   hours for 7 days.   * oxyCODONE 10 mg immediate release tablet; Commonly known as:   Roxicodone; Take 1 tablet (10 mg) by mouth every 4 hours if needed for   severe pain (7 - 10) for up to 1 day.   * oxyCODONE 5 mg immediate release tablet; Commonly known as:    Roxicodone; Take 1 tablet (5 mg) by mouth every 4 hours if needed for   severe pain (7 - 10) for up to 4 days.   polyethylene glycol 17 gram packet; Commonly known as: Glycolax,   Miralax; Take 17 g by mouth once daily for 3 days.  * This list has 2 medication(s) that are the same as other medications   prescribed for you. Read the directions carefully, and ask your doctor or   other care provider to review them with you.     CONTINUE taking these medications     amitriptyline 50 mg tablet; Commonly known as: Elavil; TAKE ONE TABLET   BY MOUTH EVERY DAY AT BEDTIME   atorvastatin 80 mg tablet; Commonly known as: Lipitor; take one tablet   by mouth every day   citalopram 40 mg tablet; Commonly known as: CeleXA; TAKE 1 TABLET (40   MG) BY MOUTH ONCE DAILY   gabapentin 100 mg capsule; Commonly known as: Neurontin; Take 1 capsule   (100 mg) by mouth once daily at bedtime. Take at bedtime.   lisinopriL-hydrochlorothiazide 10-12.5 mg tablet; Take 1 tablet by mouth   once daily.   propranolol  mg 24 hr capsule; Commonly known as: Inderal LA; TAKE   ONE CAPSULE BY MOUTH ONCE DAILY   REFRESH TEARS OPHT   rizatriptan 10 mg tablet; Commonly known as: Maxalt; Take 1 tablet (10   mg) by mouth 1 time if needed for migraine for up to 60 doses. May repeat   in 2 hours if unresolved. Do not exceed 30 mg in 24 hours.   topiramate 25 mg tablet; Commonly known as: Topamax; take 1 tablet by   mouth in the morning and 2 tablets at bedtime   valACYclovir 500 mg tablet; Commonly known as: Valtrex; TAKE 1 TABLET   (500 mg) BY MOUTH ONCE DAILY   Vitamin D3 50 mcg (2,000 unit) capsule; Generic drug: cholecalciferol;   TAKE 1 CAPSULE BY MOUTH ONCE A DAY     STOP taking these medications     traMADol 50 mg tablet; Commonly known as: Ultram     ASK your doctor about these medications     ascorbic acid 1,000 mg tablet; Commonly known as: Vitamin C   multivitamin tablet       Outpatient Follow-Up  Future Appointments   Date Time Provider  Department Center   7/8/2025 11:30 AM [facility] BRENDA 3 [facility] Rad   7/8/2025  1:00 PM Nereida Berkowitz APRN-CNP [id number] [facility]   7/17/2025  3:30 PM Raghav Gillette MD [id number] [facility]   9/22/2025  9:30 AM Raghav Gillette MD [id number] [facility]   9/30/2025  9:15 AM Ricardo Marks MD [id number] [facility] outpatient follow-up appt on 6/3 at 10:00 am.    Melinda Zimmer, TAMARA  [facility] PGY-1  [id number]

## 2025-05-30 NOTE — PROGRESS NOTES
05/30/25 0744   Discharge Planning   Home or Post Acute Services None   Expected Discharge Disposition Home   Does the patient need discharge transport arranged? No  (Family to transport)   Stroke Family Assessment   Stroke Family Assessment Needed No   Intensity of Service   Intensity of Service 0-30 min     Patient is POD 2  from an arthrotomy of left TMJ.  She is medically cleared for discharge.  HHC: none  O2: none  WOUNDS: surgical  ADOD: 1-2 days  Patient's spouse will transport her home at discharge.  Patient has no discharge need.  Kayce Yip RN

## 2025-05-30 NOTE — DISCHARGE INSTRUCTIONS
"MEDICATIONS  ° Pain medication should be taken only during the time that you feel significant discomfort. If the pain is severe, the prescription medication can be used. However, if only mild discomfort is experienced, try to use a less potent, over the counter medication such as Advil (ibuprofen and/or Tylenol (Acetaminophen). These can be taken in crushed tablets or in liquid form.  ° Antibiotics: This medicine should be taken at the appropriate interval as described on the bottle. Be sure not to miss any doses and take the medicine until it is gone.  ° Lip ointment:  Keep enough ointment, such as Vaseline, on the lips to keep them looking wet.     WOUND CARE  ° We recommend using ice packs on your face for the first 48 hours to reduce swelling and bruising over the affected areas. We recommend 20 minutes on and 20 minutes off. Make sure to wrap the ice pack in a towel - do not apply directly to skin.  Cold or heat directly on numb areas can lead to permanent damage of the skin.  ° Keep your head elevated, at least 30 degrees, to minimize swelling. This may require you to sleep in a reclining chair or using several pillows in bed.  ° Do not shower if you have a dressing on. Once the dressing is removed, keep the wound clean and dry for five days. On the 6th day, you may get it wet but no direct streams of water (for example, in the shower) and be careful to pat dry and not rub the wound.   ° If you have sutures, you may clean the sutures only with a cotton swab and a solution of ½ hydrogen peroxide and ½ water. We highly recommend this as the sutures dry out and become \"crusty\" with time.  ° Keep a thin coating of antibiotic ointment or Vaseline on the surgical sites (Polysporin, Bacitracin).      PHYSICAL ACTIVITY  ° Following surgery you will find that your energy level is much lower. This will take some time to return to normal.  ° Although you just had surgery, it is important that you do not stay in bed while " awake to minimize the chances of leg clots. We highly encourage occasional standing and walking as tolerated.  ° Try to transfer to a chair instead and walk throughout the house as much as you can tolerate. Try to be as active as possible. The swelling will worsen over the next 3-4 days after surgery and is expected. The swelling will begin to subside over the next few days.  ° Physical exercise such as walking or running can begin 2 or 3 weeks after surgery.  ° When you attempt to return to normal physical activity start slowly and work up to your normal level.  ° It is important that you take deep breaths after surgery to help prevent development of pneumonia. Try to take 10-20 deep breaths every hour while you are awake. Encourage yourself to gently cough if you feel mucous accumulating in the back of your throat or lungs.      DIET  ° Maintain a no chew diet until follow up.  ° Your diet will be slowly advanced accordingly during follow up.   ° Some sort of diet supplement such as Boost, Ensure, or similar substitutes may be used to increase calorie intake.     DRINKING  Keep hydrated by drinking at least 8-9 glasses of liquids a day. This is extremely important to prevent dehydration. Signs of dehydration are: dry mouth, dark yellow urine in small amounts, and dizziness with change in position or increased feeling of weakness/tiredness.  ° Do not use a straw for the first day or two since this can create more bleeding and may be difficult due to numbness.  ° Attempt to drink from a cup as soon as possible. While some fluid may spill when drinking, a cup is still the most effective way for taking fluids. The use of a ``sippy cup´´ like those used by toddlers may be helpful for the first few days.  ° Place a towel under your chin and pour a small amount of liquid into the cup. Tip your head back slightly and attempt to open your mouth a tiny bit while pouring the fluid into your mouth. Pour slowly and take in a  small amount of fluid. Take the cup away from your mouth. If necessary use slight finger pressure to place your lips together and attempt to swallow. This will be difficult at first, but you will find that it will become much easier in a day or two.  ° If drinking from a cup seems to be impossible, continue using the syringe with the tubing attached. Another alternative is to obtain a squeeze bottle to squirt fluid into your mouth.     JAW EXERCISES  ° You will be given appropriate advice regarding exercise of your jaw following your joint surgery.  ° During the first 24 hours after surgery no movement is permitted. Following that the exercises should be very gentle and slowly ``built up´´ over the course of 2-3 weeks.      CHANGES IN BITE  ° It is not uncommon for your bite you feel slightly different than prior to surgery. This will gradually settle over the coming weeks as the swelling in the joint reduces.      HEARING CHANGES  ° Patients may occasionally notice deafness or decreased hearing after surgery. This may occur due to swelling and accumulation of fluid and/or a blood clot in the ear canal.   ° This will subside over the next 2 weeks. But please notify your surgeon on follow up.      FACIAL MUSCLE WEAKNESS  ° The site of the surgery is adjacent to branches of the nerve that controls the muscles of your face. Due to this, nerves may be stretched due to the surgery or post-operative swelling, causing weakness of these muscles.   ° This will generally resolve over the course of weeks to months, and will be monitored during your follow up visits.      PLEASE REPORT ANY OF THE FOLLOWING TO OUR TEAM:  ° Sudden or excessive bleeding, swelling, or bruising.  ° Any itching, rash, or adverse reaction to medication.  ° Fever over 100 degrees Fahrenheit.  ° Drainage or discharge from the incision sites (other than blood).  ° Any injury to the face over the next 6 weeks.     Follow- up appointment: 6/3/25 @ 10:00  am    If you have any questions or concerns please contact us during regular office hours ([phone]).  For any emergency or after hours questions do not hesitate to contact the resident on call. You may call [phone] for the hospital  and ask for the ``Oral Surgeon On Call´´.

## 2025-05-30 NOTE — CARE PLAN
The patient's goals for the shift include      The clinical goals for the shift include Patient will remain comfortable this shift.    Over the shift, the patient did not make progress toward the following goals. Barriers to progression include anthralgia of left temporal mandibular joint. Recommendations to address these barriers include pain meds as needed and ice.

## 2025-06-03 DIAGNOSIS — M26.622 ARTHRALGIA OF LEFT TEMPOROMANDIBULAR JOINT: Primary | ICD-10-CM

## 2025-06-03 RX ORDER — OXYCODONE HYDROCHLORIDE 5 MG/1
5 TABLET ORAL EVERY 4 HOURS PRN
Qty: 24 TABLET | Refills: 0 | Status: SHIPPED | OUTPATIENT
Start: 2025-06-03 | End: 2025-06-08

## 2025-06-05 DIAGNOSIS — G43.709 CHRONIC MIGRAINE WITHOUT AURA WITHOUT STATUS MIGRAINOSUS, NOT INTRACTABLE: ICD-10-CM

## 2025-06-08 DIAGNOSIS — M19.049 ARTHRITIS OF CARPOMETACARPAL JOINT: Primary | ICD-10-CM

## 2025-06-08 RX ORDER — TRAMADOL HYDROCHLORIDE 50 MG/1
50 TABLET, FILM COATED ORAL 3 TIMES DAILY PRN
Qty: 50 TABLET | Refills: 2 | Status: SHIPPED | OUTPATIENT
Start: 2025-06-08 | End: 2025-07-28

## 2025-06-09 DIAGNOSIS — E55.9 MILD VITAMIN D DEFICIENCY: ICD-10-CM

## 2025-06-09 RX ORDER — AMITRIPTYLINE HYDROCHLORIDE 50 MG/1
50 TABLET, FILM COATED ORAL NIGHTLY
Qty: 90 TABLET | Refills: 2 | Status: SHIPPED | OUTPATIENT
Start: 2025-06-09

## 2025-06-09 RX ORDER — NICOTINE 11MG/24HR
PATCH, TRANSDERMAL 24 HOURS TRANSDERMAL DAILY
Qty: 90 CAPSULE | Refills: 0 | Status: SHIPPED | OUTPATIENT
Start: 2025-06-09

## 2025-06-13 DIAGNOSIS — G43.709 CHRONIC MIGRAINE WITHOUT AURA WITHOUT STATUS MIGRAINOSUS, NOT INTRACTABLE: ICD-10-CM

## 2025-06-14 DIAGNOSIS — G60.9 IDIOPATHIC PERIPHERAL NEUROPATHY: ICD-10-CM

## 2025-06-14 DIAGNOSIS — G43.009 MIGRAINE WITHOUT AURA AND WITHOUT STATUS MIGRAINOSUS, NOT INTRACTABLE: ICD-10-CM

## 2025-06-16 RX ORDER — GABAPENTIN 100 MG/1
100 CAPSULE ORAL NIGHTLY
Qty: 90 CAPSULE | Refills: 0 | Status: SHIPPED | OUTPATIENT
Start: 2025-06-16 | End: 2025-06-18 | Stop reason: SDUPTHER

## 2025-06-16 RX ORDER — TOPIRAMATE 25 MG/1
TABLET, FILM COATED ORAL
Qty: 90 TABLET | Refills: 0 | Status: SHIPPED | OUTPATIENT
Start: 2025-06-16

## 2025-06-17 RX ORDER — AMITRIPTYLINE HYDROCHLORIDE 50 MG/1
50 TABLET, FILM COATED ORAL NIGHTLY
Qty: 90 TABLET | Refills: 1 | Status: SHIPPED | OUTPATIENT
Start: 2025-06-17

## 2025-06-18 ENCOUNTER — PATIENT MESSAGE (OUTPATIENT)
Dept: NEUROLOGY | Facility: CLINIC | Age: 60
End: 2025-06-18
Payer: COMMERCIAL

## 2025-06-18 DIAGNOSIS — G60.9 IDIOPATHIC PERIPHERAL NEUROPATHY: ICD-10-CM

## 2025-06-18 RX ORDER — GABAPENTIN 100 MG/1
100 CAPSULE ORAL NIGHTLY
Qty: 90 CAPSULE | Refills: 0 | Status: SHIPPED | OUTPATIENT
Start: 2025-06-18

## 2025-07-01 NOTE — PROGRESS NOTES
[facility]  Nereida Conway female   1965 60 y.o.  [id number]      Chief Complaint  High risk breast cancer surveillance and short term follow up imaging    History Of Present Illness  Nereida Conway is a 60 y.o.  female seen in the breast center for high risk breast cancer evaluation. 2024 right breast benign core biopsy. She denies breast surgery. She has family history of breast cancer in both sisters who had negative genetic testing.    BREAST IMAGIN2024 Bilateral screening mammogram, BI-RADS Category 0, Right breast microcalcifications. 2024 RIGHT diagnostic mammogram, BI-RADS Category 4, 2 discrete groupings round calcifications in the central right breast at middle depth, increased from multiple priors. Stereotactic guided biopsy of 1 of these 2 groupings is recommended for further evaluation. An additional area of grouped coarse and round calcifications is seen at anterior depth which are probably benign. Recommend six-month mammographic follow-up if biopsy results are benign. 2025 RIGHT diagnostic mammogram BI-RADS Category 3. Stable probably benign right breast calcifications. 2025 Full breast MRI BI-RADS Category 1. 2025 bilateral diagnostic mammogram and ultrasound BI-RADS Category 3.  Stable probably benign right breast calcifications.       REPRODUCTIVE HISTORY: menarche age 12, , first birth age 29,  8 months, no OCP's, menopause age unknown - s/p partial hysterectomy age 48, heterogeneously dense     FAMILY CANCER HISTORY:   Sister: Breast cancer, age 44  Sister (2): Breast cancer, age 49  Mother: Brain cancer, alive  Father: Prostate cancer  Paternal aunt: Colon cancer  Paternal uncle: Colon cancer    Review of Systems  Constitutional:  Negative for appetite change, fatigue, fever and unexpected weight change.   HENT:  Negative for ear pain, hearing loss, nosebleeds, sore throat and trouble swallowing.    Eyes:  Negative for  discharge, itching and visual disturbance.   Breast: As stated in HPI.  Respiratory:  Negative for cough, chest tightness and shortness of breath.    Cardiovascular:  Negative for chest pain, palpitations and leg swelling.   Gastrointestinal:  Negative for abdominal pain, constipation, diarrhea and nausea.   Endocrine: Negative for cold intolerance and heat intolerance.   Genitourinary:  Negative for dysuria, frequency, hematuria, pelvic pain and vaginal bleeding.   Musculoskeletal:  Negative for arthralgias, back pain, gait problem, joint swelling and myalgias.   Skin:  Negative for color change and rash.   Allergic/Immunologic: Negative for environmental allergies and food allergies.   Neurological:  Negative for dizziness, tremors, speech difficulty, weakness, numbness and headaches.   Hematological:  Does not bruise/bleed easily.   Psychiatric/Behavioral:  Negative for agitation, dysphoric mood and sleep disturbance. The patient is not nervous/anxious.       Past Medical History  She has a past medical history of Alopecia, Anxiety and depression, Arthritis, Bronchitis, Bursitis of hip, Cataract, Chronic diarrhea (This time it started around Oct.18,2023), DDD (degenerative disc disease), lumbar, DJD (degenerative joint disease), Dry eyes, Genital herpes, GERD (gastroesophageal reflux disease), Glaucoma (increased eye pressure), Headache, tension-type, Hypercholesterolemia, Hypertension, Irritable bowel syndrome (2021), Jaw pain, Low back strain (1995), Lumbosacral disc disease, Migraines, Neck pain, Neuromuscular disorder (Multi) (2020), Numbness, OA (osteoarthritis), PAD (peripheral artery disease), Peripheral neuropathy, Polyarthralgia, Pulmonary nodule, Raynaud's disease, Rotator cuff syndrome, Sinusitis, Skin tag, TMJ pain dysfunction syndrome, Visual impairment, and Vitamin D deficiency.    She has no past medical history of Personal history of irradiation.    Surgical History  She has a past surgical  history that includes Laparoscopy diagnostic / biopsy / aspiration / lysis (10/09/2014); Tonsillectomy (10/09/2014); Dilation and curettage of uterus (10/09/2014); Total hip arthroplasty (Bilateral, ); Rotator cuff repair (Right, );  section, low transverse; Hysterectomy; Colonoscopy; Other surgical history (2023); Cataract extraction w/  intraocular lens implant (Right); Breast biopsy (Right, 2024); Hand surgery; Joint replacement; Wrist surgery; and Shoulder surgery.    Family History  Cancer-related family history includes Brain cancer in her mother and mother; Breast cancer in her father's brother, father's brother, and sister; Breast cancer (age of onset: 41) in her sister; Breast cancer (age of onset: 49) in her sister; Cancer in her father, father, father, father, father's brother, father's brother, father's brother, father's brother, father's brother, father's brother, father's brother, father's brother, father's brother, father's brother, father's sister, mother, mother, mother, mother, mother, mother, sister, sister, sister, sister, sister, and sister; Colon cancer in her father's brother and father's sister; Pancreatic cancer in her mother's sister; Prostate cancer in her father and father.     Social History  She reports that she quit smoking about 12 years ago. Her smoking use included cigarettes. She started smoking about 22 years ago. She has a 10 pack-year smoking history. She has never been exposed to tobacco smoke. She has never used smokeless tobacco. She reports that she does not currently use alcohol. She reports that she does not use drugs.    Allergies  Duloxetine, Indomethacin, Prednisone, and Sulfamethoxazole    Medications  Current Outpatient Medications   Medication Instructions    amitriptyline (ELAVIL) 50 mg, oral, Nightly    ascorbic acid (VITAMIN C) 1,000 mg, Daily    atorvastatin (LIPITOR) 80 mg, oral, Daily    bacitracin 500 unit/gram ointment Topical, 2  times daily    carboxymethylcellulose sodium (REFRESH TEARS OPHT) As needed    citalopram (CELEXA) 40 mg, oral, Daily    gabapentin (NEURONTIN) 100 mg, oral, Nightly, Take at bedtime.    hydrOXYzine pamoate (VISTARIL) 25 mg, oral, 3 times daily PRN, Take a tab nightly PRN    lisinopriL-hydrochlorothiazide 10-12.5 mg tablet 1 tablet, oral, Daily    multivitamin tablet 1 tablet, Daily    propranolol LA (INDERAL LA) 120 mg, oral, Daily    rizatriptan (MAXALT) 10 mg, oral, Once as needed, May repeat in 2 hours if unresolved. Do not exceed 30 mg in 24 hours.    topiramate (Topamax) 25 mg tablet TAKE 1 TABLET BY MOUTH IN THE MORNING AND THEN 2 TABLETS AT BEDTIME    traMADol (ULTRAM) 50 mg, oral, 3 times daily PRN    valACYclovir (VALTREX) 500 mg, oral, Daily    Vitamin D3 50 mcg (2,000 unit) capsule oral, Daily       Last Recorded Vitals  Vitals:    07/08/25 1227   BP: 108/69   Pulse: 70   Temp: 36.7 °C (98.1 °F)   SpO2: 99%           Physical Exam  Physical Exam  Patient is alert and oriented x3 and in an anxious mood. Her gait is steady and hand grasps are equal. Sclera is clear. The breasts are asymmetrical, left larger. The tissue is soft without palpable abnormalities, discrete nodules or masses. The skin and nipples appear normal. There is no cervical, supraclavicular or axillary lymphadenopathy. Heart rate and rhythm normal, S1 and S2 appreciated. The lungs are clear to auscultation bilaterally. Abdomen is soft and non-tender.      Relevant Results and Imaging          I explained the results in depth, along with suggested explanation for follow up recommendations based on the testing results. BI-RADS Category 3    Visit Diagnosis  1. Calcification of right breast on mammography  Clinic Appointment Request Follow Up    BI mammo bilateral diagnostic tomosynthesis    Clinic Appointment Request Follow Up    MR breast bilateral w contrast full protocol      2. Heterogeneously dense tissue of both breasts on mammography   Clinic Appointment Request Follow Up    BI mammo bilateral diagnostic tomosynthesis    Clinic Appointment Request Follow Up    MR breast bilateral w contrast full protocol      3. Breast cancer screening, high risk patient  Clinic Appointment Request Follow Up    BI mammo bilateral diagnostic tomosynthesis    Clinic Appointment Request Follow Up    MR breast bilateral w contrast full protocol      4. Family history of breast cancer  Clinic Appointment Request Follow Up    BI mammo bilateral diagnostic tomosynthesis    Clinic Appointment Request Follow Up    MR breast bilateral w contrast full protocol      5. Abnormal finding on breast imaging  Clinic Appointment Request Follow Up    BI mammo bilateral diagnostic tomosynthesis    Clinic Appointment Request Follow Up    MR breast bilateral w contrast full protocol          RISK PROFILE    Assessment/Plan  Normal breast exam and stable imaging, benign core biopsy no breast surgery, family history breast cancer, heterogeneously dense breast tissue.   Declined endocrine therapy    Patient Discussion/Summary   Your clinical examination and imaging are stable. Please return in July 2026 for mammogram and office visit or sooner if you have any problems or concerns.     High risk breast surveillance care plan:  Yearly mammogram with digital breast tomosynthesis  Twice yearly clinical breast examinations  Breast MRI-  Monthly self breast examinations &/or regular self breast awareness  Vitamin D3 2000 IU/daily (over the counter) unless your PCP recommends you take a specific dose  Exercise 3-4 times per week for 45-60 minutes  Limit alcohol to 3-4 drinks per week if you are menopausal  Eat healthy low-fat diet with lots of vegetable & fruits    Risk model indicates you are eligible for endocrine therapy with Tamoxifen, Raloxifene or Aromatase inhibitor. Endocrine therapy reduces lifetime risk of breast cancer by up to 50% when taken for 5 years. There is an alternative low  dose Tamoxifen which can be taken for only 3 years.      IMPORTANT INFORMATION REGARDING YOUR RESULTS    If you receive medical information from My [facility] Personal Health Record (online chart) your results will be released into your chart. This means you may view or see results of your biopsy or procedure before I contact you directly. If this occurs, please call the office and we will discuss your results over the phone.     You can see your health information, review clinical summaries from office visits & test results online when you follow your health with MY  Chart, a personal health record. To sign up go to [email or web address]. If you need assistance with signing up or trouble getting into your account call [employer] Patient Line 24/7 at [phone].    My office phone number is [phone] if you need to get in touch with me or have additional questions or concerns. Thank you for choosing [facility] and trusting me as your healthcare provider. I look forward to seeing you again at your next office visit. I am honored to be a provider on your health care team and I remain dedicated to helping you achieve your health goals.      Nereida Berkowitz, APRN-CNP

## 2025-07-02 ENCOUNTER — APPOINTMENT (OUTPATIENT)
Dept: NEUROLOGY | Facility: CLINIC | Age: 60
End: 2025-07-02
Payer: COMMERCIAL

## 2025-07-06 DIAGNOSIS — A60.04 HERPES SIMPLEX VULVOVAGINITIS: ICD-10-CM

## 2025-07-07 RX ORDER — VALACYCLOVIR HYDROCHLORIDE 500 MG/1
500 TABLET, FILM COATED ORAL DAILY
Qty: 30 TABLET | Refills: 0 | Status: SHIPPED | OUTPATIENT
Start: 2025-07-07

## 2025-07-08 ENCOUNTER — HOSPITAL ENCOUNTER (OUTPATIENT)
Dept: RADIOLOGY | Facility: CLINIC | Age: 60
Discharge: HOME | End: 2025-07-08
Payer: COMMERCIAL

## 2025-07-08 ENCOUNTER — OFFICE VISIT (OUTPATIENT)
Dept: SURGICAL ONCOLOGY | Facility: CLINIC | Age: 60
End: 2025-07-08
Payer: COMMERCIAL

## 2025-07-08 VITALS
OXYGEN SATURATION: 99 % | HEART RATE: 70 BPM | DIASTOLIC BLOOD PRESSURE: 69 MMHG | WEIGHT: 128 LBS | SYSTOLIC BLOOD PRESSURE: 108 MMHG | BODY MASS INDEX: 25 KG/M2 | TEMPERATURE: 98.1 F

## 2025-07-08 VITALS — HEIGHT: 60 IN | BODY MASS INDEX: 26.4 KG/M2 | WEIGHT: 134.48 LBS

## 2025-07-08 DIAGNOSIS — Z12.39 BREAST CANCER SCREENING, HIGH RISK PATIENT: ICD-10-CM

## 2025-07-08 DIAGNOSIS — R92.8 ABNORMAL FINDING ON BREAST IMAGING: ICD-10-CM

## 2025-07-08 DIAGNOSIS — R92.1 CALCIFICATION OF RIGHT BREAST ON MAMMOGRAPHY: ICD-10-CM

## 2025-07-08 DIAGNOSIS — R92.333 HETEROGENEOUSLY DENSE TISSUE OF BOTH BREASTS ON MAMMOGRAPHY: ICD-10-CM

## 2025-07-08 DIAGNOSIS — Z80.3 FAMILY HISTORY OF BREAST CANCER: ICD-10-CM

## 2025-07-08 PROCEDURE — 3078F DIAST BP <80 MM HG: CPT

## 2025-07-08 PROCEDURE — 77062 BREAST TOMOSYNTHESIS BI: CPT

## 2025-07-08 PROCEDURE — 76642 ULTRASOUND BREAST LIMITED: CPT | Performed by: STUDENT IN AN ORGANIZED HEALTH CARE EDUCATION/TRAINING PROGRAM

## 2025-07-08 PROCEDURE — 99213 OFFICE O/P EST LOW 20 MIN: CPT

## 2025-07-08 PROCEDURE — 3074F SYST BP LT 130 MM HG: CPT

## 2025-07-08 PROCEDURE — 76642 ULTRASOUND BREAST LIMITED: CPT | Mod: LT

## 2025-07-08 PROCEDURE — 76982 USE 1ST TARGET LESION: CPT

## 2025-07-08 PROCEDURE — 1036F TOBACCO NON-USER: CPT

## 2025-07-08 PROCEDURE — 77062 BREAST TOMOSYNTHESIS BI: CPT | Performed by: STUDENT IN AN ORGANIZED HEALTH CARE EDUCATION/TRAINING PROGRAM

## 2025-07-08 PROCEDURE — 77066 DX MAMMO INCL CAD BI: CPT | Performed by: STUDENT IN AN ORGANIZED HEALTH CARE EDUCATION/TRAINING PROGRAM

## 2025-07-08 ASSESSMENT — PAIN SCALES - GENERAL: PAINLEVEL_OUTOF10: 0-NO PAIN

## 2025-07-08 NOTE — PATIENT INSTRUCTIONS
Your clinical examination and imaging are stable. Please return in July 2026 for mammogram and office visit or sooner if you have any problems or concerns.     High risk breast surveillance care plan:  Yearly mammogram with digital breast tomosynthesis  Twice yearly clinical breast examinations  Breast MRI-  Monthly self breast examinations &/or regular self breast awareness  Vitamin D3 2000 IU/daily (over the counter) unless your PCP recommends you take a specific dose  Exercise 3-4 times per week for 45-60 minutes  Limit alcohol to 3-4 drinks per week if you are menopausal  Eat healthy low-fat diet with lots of vegetable & fruits    Risk model indicates you are eligible for endocrine therapy with Tamoxifen, Raloxifene or Aromatase inhibitor. Endocrine therapy reduces lifetime risk of breast cancer by up to 50% when taken for 5 years. There is an alternative low dose Tamoxifen which can be taken for only 3 years.      IMPORTANT INFORMATION REGARDING YOUR RESULTS    If you receive medical information from My [facility] Personal Health Record (online chart) your results will be released into your chart. This means you may view or see results of your biopsy or procedure before I contact you directly. If this occurs, please call the office and we will discuss your results over the phone.     You can see your health information, review clinical summaries from office visits & test results online when you follow your health with MY  Chart, a personal health record. To sign up go to [email or web address]. If you need assistance with signing up or trouble getting into your account call [employer] Patient Line 24/7 at [phone].    My office phone number is [phone] if you need to get in touch with me or have additional questions or concerns. Thank you for choosing [facility] and trusting me as your healthcare provider. I look forward to seeing you again at your next office visit. I am honored to be a  provider on your health care team and I remain dedicated to helping you achieve your health goals.

## 2025-07-09 RX ORDER — CHLORHEXIDINE GLUCONATE ORAL RINSE 1.2 MG/ML
SOLUTION DENTAL
COMMUNITY
Start: 2025-05-20

## 2025-07-10 ENCOUNTER — OFFICE VISIT (OUTPATIENT)
Dept: PRIMARY CARE | Facility: CLINIC | Age: 60
End: 2025-07-10
Payer: COMMERCIAL

## 2025-07-10 VITALS — TEMPERATURE: 97.6 F | BODY MASS INDEX: 24.74 KG/M2 | WEIGHT: 126 LBS | HEIGHT: 60 IN

## 2025-07-10 DIAGNOSIS — J84.10 PULMONARY FIBROSIS, UNSPECIFIED (MULTI): ICD-10-CM

## 2025-07-10 DIAGNOSIS — H61.22 IMPACTED CERUMEN OF LEFT EAR: Primary | ICD-10-CM

## 2025-07-10 DIAGNOSIS — I10 PRIMARY HYPERTENSION: ICD-10-CM

## 2025-07-10 DIAGNOSIS — E78.00 PURE HYPERCHOLESTEROLEMIA: ICD-10-CM

## 2025-07-10 RX ORDER — ATORVASTATIN CALCIUM 80 MG/1
80 TABLET, FILM COATED ORAL DAILY
Qty: 90 TABLET | Refills: 3 | Status: SHIPPED | OUTPATIENT
Start: 2025-07-10

## 2025-07-10 RX ORDER — PROPRANOLOL HYDROCHLORIDE 120 MG/1
120 CAPSULE, EXTENDED RELEASE ORAL DAILY
Qty: 90 CAPSULE | Refills: 3 | Status: SHIPPED | OUTPATIENT
Start: 2025-07-10

## 2025-07-10 ASSESSMENT — ENCOUNTER SYMPTOMS
DIARRHEA: 0
LOSS OF SENSATION IN FEET: 0
WHEEZING: 0
TREMORS: 0
SORE THROAT: 0
CONSTIPATION: 0
ARTHRALGIAS: 0
ABDOMINAL DISTENTION: 0
FACIAL SWELLING: 0
ANAL BLEEDING: 0
ABDOMINAL PAIN: 0
DIZZINESS: 0
FEVER: 0
DEPRESSION: 0
CONFUSION: 0
NERVOUS/ANXIOUS: 0
DIFFICULTY URINATING: 0
FATIGUE: 0
NAUSEA: 0
SLEEP DISTURBANCE: 0
EYE PAIN: 0
FREQUENCY: 0
SHORTNESS OF BREATH: 0
CHEST TIGHTNESS: 0
COUGH: 0
PALPITATIONS: 0
NUMBNESS: 0
COLOR CHANGE: 0
CHOKING: 0
VOMITING: 0
WEAKNESS: 0
APPETITE CHANGE: 0
POLYDIPSIA: 0
JOINT SWELLING: 0
CHILLS: 0
MYALGIAS: 0
HEMATURIA: 0
POLYPHAGIA: 0
OCCASIONAL FEELINGS OF UNSTEADINESS: 0
HEADACHES: 0
EYE DISCHARGE: 0

## 2025-07-10 ASSESSMENT — PAIN SCALES - GENERAL: PAINLEVEL_OUTOF10: 0-NO PAIN

## 2025-07-10 NOTE — PROGRESS NOTES
Subjective   Patient ID: Nereida Conway is a 60 y.o. female with history of anxiety and depression, IBS with diarrhea, ostearthritis of hips b/l s/p right hip replacement, hypertension, hyperlipidemia, migraines, TMJ, s/p TMJ replacement on 5/28/2025, s/p partial hysterectomy who presents for Ear Fullness (left).    HPIThe patient is complaining of left ear fullness and diminished hearing.  She denies any pain or discomfort.  She had TMJ replacement on 5/20/2025.  She is doing well.  She started physical therapy.  Patient ID: Nereida Conway is a 60 y.o. female.    Ear Cerumen Removal    Date/Time: 7/10/2025 12:53 PM    Performed by: Lise Jeff PA-C  Authorized by: Lise Jeff PA-C    Consent:     Consent obtained:  Verbal    Consent given by:  Patient    Risks, benefits, and alternatives were discussed: yes      Risks discussed:  Dizziness, infection, incomplete removal, pain and bleeding    Alternatives discussed:  Alternative treatment  Universal protocol:     Procedure explained and questions answered to patient or proxy's satisfaction: yes      Patient identity confirmed:  Verbally with patient  Procedure details:     Location:  L ear    Procedure type: irrigation      Procedure outcomes: cerumen removed    Post-procedure details:     Inspection:  TM intact    Hearing quality:  Improved    Procedure completion:  Tolerated      Review of Systems   Constitutional:  Negative for appetite change, chills, fatigue and fever.   HENT:  Negative for congestion, ear pain, facial swelling, hearing loss, nosebleeds and sore throat.    Eyes:  Negative for pain, discharge and visual disturbance.   Respiratory:  Negative for cough, choking, chest tightness, shortness of breath and wheezing.    Cardiovascular:  Negative for chest pain, palpitations and leg swelling.   Gastrointestinal:  Negative for abdominal distention, abdominal pain, anal bleeding, constipation, diarrhea, nausea and vomiting.   Endocrine:  Negative for cold intolerance, heat intolerance, polydipsia, polyphagia and polyuria.   Genitourinary:  Negative for difficulty urinating, frequency, hematuria and urgency.   Musculoskeletal:  Negative for arthralgias, gait problem, joint swelling and myalgias.   Skin:  Negative for color change and rash.   Neurological:  Negative for dizziness, tremors, syncope, weakness, numbness and headaches.   Psychiatric/Behavioral:  Negative for behavioral problems, confusion, sleep disturbance and suicidal ideas. The patient is not nervous/anxious.        Objective   Temp 36.4 °C (97.6 °F) (Temporal)   Ht (!) 1.524 m (5')   Wt 57.2 kg (126 lb)   LMP  (LMP Unknown)   Breastfeeding No   BMI 24.61 kg/m²     Physical Exam  Constitutional:       General: She is not in acute distress.     Appearance: Normal appearance.   HENT:      Head: Normocephalic and atraumatic.      Nose: Nose normal.   Eyes:      Extraocular Movements: Extraocular movements intact.      Conjunctiva/sclera: Conjunctivae normal.      Pupils: Pupils are equal, round, and reactive to light.   Cardiovascular:      Rate and Rhythm: Normal rate and regular rhythm.      Pulses: Normal pulses.      Heart sounds: Normal heart sounds.   Pulmonary:      Effort: Pulmonary effort is normal.      Breath sounds: Normal breath sounds.   Abdominal:      General: Bowel sounds are normal.      Palpations: Abdomen is soft.   Musculoskeletal:         General: Normal range of motion.      Cervical back: Normal range of motion and neck supple.   Neurological:      General: No focal deficit present.      Mental Status: She is alert and oriented to person, place, and time.   Psychiatric:         Mood and Affect: Mood normal.         Behavior: Behavior normal.         Thought Content: Thought content normal.         Judgment: Judgment normal.         Assessment/Plan   cerumen impaction of left ear  Removed with lavage  Tolerated well    Left TMJ  S/p TMJ replacement on  5/28/25  Started PT  Follow up with oral surgery Dr. Delgado     HTN  Well-controlled  Continue lisinopril-HCTZ 10-12.5 mg daily  If low, will discontinue hydrochlorothiazide and continue lisinopril  No added salt diet, do not add salt to your food  Try to exercise every other day for 30 minutes    Arthralgia, osteoarthritis of multiple joints   Low back pain, left hip pain, right TMJ, right and left thumb CMC joint pains  s/p hip replacement   S/p right CMC joint arthroplasty   S/p TMJ replacement on 5/20/2025  Failed Neurontin, tramadol, Cymbalta, and indomethacin  Currently on tramadol 50 mg 3 times daily given by Dr. Munguia  Continue physical therapy  Follow-up with orthopedic surgery Dr. Munguia    IBS with diarrhea  Stable  Xifaxan as needed  Take probiotics daily  Colonoscopy February 2024, repeat in 7 years  Follow-up with gastroenterology     Peripheral neuropathy  Started on Gabapentin 100 mg hs   Follow up with neurology Rorick     leg spasms   Improved  Off of magnesium 400 mg  Drink plenty of fluids     Former smoker  quit in 2015  4 mm pulmonary nodule right lung  Repeat CT chest 3/18/2023    Dyslipidemia  Continue Atorvastatin 80 mg at bed time  Continue with the low fat, low cholesterol diet  I recommend Mediterranean diet, which include fish, chicken, vegetables and olive oil  Exercise daily for 30 minutes at least 3 times a week     Migraines  Stable  Continue Amitriptyline 50 mg at bed time   Continue Propranolol 120 mg once daily  Maxalt as needed  Follow-up with neurology     Depression and anxiety  stable  Continue Amitriptyline 50 mg t bed time  Take Citalopram 40 mg once daily     Herpes simplex 2   Take Valacyclovir as needed for flare ups.     Abnormal mammogram 7/5/2024  S/p Biopsy, benign  Mammogram 7/8/25 breast calcifications, repeat in a year   Follows with oncology    Vitamin D deficiency  Continue vitamin D 2000 units daily    Body mass index is 24.61 kg/m².  Patient has lost 13  pounds    [facility] maintenance  Mammogram 7/8/25 breast calcifications, repeat in a year   Colonoscopy 2024, repeat in 7 years    Follow-up in 3 months

## 2025-07-17 ENCOUNTER — APPOINTMENT (OUTPATIENT)
Dept: NEUROLOGY | Facility: CLINIC | Age: 60
End: 2025-07-17
Payer: COMMERCIAL

## 2025-07-21 ENCOUNTER — APPOINTMENT (OUTPATIENT)
Dept: ORTHOPEDIC SURGERY | Facility: CLINIC | Age: 60
End: 2025-07-21
Payer: COMMERCIAL

## 2025-07-21 VITALS — HEIGHT: 60 IN | WEIGHT: 126 LBS | BODY MASS INDEX: 24.74 KG/M2

## 2025-07-21 DIAGNOSIS — M19.049 ARTHRITIS OF CARPOMETACARPAL JOINT: ICD-10-CM

## 2025-07-21 PROCEDURE — 3008F BODY MASS INDEX DOCD: CPT | Performed by: ORTHOPAEDIC SURGERY

## 2025-07-21 PROCEDURE — 99213 OFFICE O/P EST LOW 20 MIN: CPT | Performed by: ORTHOPAEDIC SURGERY

## 2025-07-21 PROCEDURE — 20600 DRAIN/INJ JOINT/BURSA W/O US: CPT | Performed by: ORTHOPAEDIC SURGERY

## 2025-07-21 RX ORDER — TRAMADOL HYDROCHLORIDE 50 MG/1
50 TABLET, FILM COATED ORAL 3 TIMES DAILY PRN
Qty: 50 TABLET | Refills: 4 | Status: SHIPPED | OUTPATIENT
Start: 2025-07-21 | End: 2025-10-12

## 2025-07-21 RX ORDER — LIDOCAINE HYDROCHLORIDE 10 MG/ML
0.5 INJECTION, SOLUTION INFILTRATION; PERINEURAL
Status: COMPLETED | OUTPATIENT
Start: 2025-07-21 | End: 2025-07-21

## 2025-07-21 RX ORDER — TRIAMCINOLONE ACETONIDE 40 MG/ML
20 INJECTION, SUSPENSION INTRA-ARTICULAR; INTRAMUSCULAR
Status: COMPLETED | OUTPATIENT
Start: 2025-07-21 | End: 2025-07-21

## 2025-07-21 RX ADMIN — TRIAMCINOLONE ACETONIDE 20 MG: 40 INJECTION, SUSPENSION INTRA-ARTICULAR; INTRAMUSCULAR at 11:29

## 2025-07-21 RX ADMIN — LIDOCAINE HYDROCHLORIDE 0.5 ML: 10 INJECTION, SOLUTION INFILTRATION; PERINEURAL at 11:29

## 2025-07-21 NOTE — PROGRESS NOTES
[facility]  Hand and Upper Extremity Service  Follow up visit         Follow up for: Left hand     Interval History: She returns for her left hand. She has CMC arthritis that has been managed with intermittent injections and oral tramadol. She received a left thumb CMC injection at last visit and her symptoms have returned. She is here for a repeat injection. She has had surgical discussion and she did well following surgery to her right thumb several years ago but she remains unable to consider surgery at this time due to a variety of issues. She lost her mother recently and her father is recovering from a recent stroke. She also is recovering from left side TMJ reconstruction and is just starting her third week of therapy following that surgery. She lost her job but now has a new job she is about to start and she needs to save up before consideration of surgical reconstruction.               Past medical history, medications, allergies, surgical history and review of systems are reviewed and otherwise unchanged when compared to last visit on 3/3/25         Examination:  Constitutional: Oriented to person, place, and time.  Appears well-developed and well-nourished.  Head: Normocephalic and atraumatic.  Eyes: Pupils are equal, round, and reactive to light.  Cardiovascular: Intact distal pulses.  Pulmonary/Chest/Breast: Effort normal. No respiratory distress.  Neurological: Alert and oriented to person, place, and time.  Skin: Skin is warm and dry.  Psychiatric: normal mood and affect.  Behavior is normal.  Musculoskeletal: Left hand reveals bony prominence at the base of the thumb. Positive thumb CMC grind test without MP joint hyperextension instability. No thenar atrophy. Subjectively normal sensation in all fingers.       Impression: Left thumb CMC joint arthritis       Plan: We gave her a left thumb CMC injection today. I have also sent a prescription refill for Tramadol 50 mg  but I have advised her that I am not a chronic pain management doctor and this has turned into chronic pain because she has been unwilling to undergo surgery. Moving forward, she is going to need to her these medications prescribed to her by her primary care physician or consider a referral to pain management. I will be happy to give her more injections in the future or schedule her for left thumb CMC joint reconstruction when she feels ready.       In Office Procedures Performed: Left thumb CMC injection   S Inj/Asp: L thumb CMC on 7/21/2025 11:29 AM  Indications: pain  Details: 25 G needle, dorsal approach  Medications: 20 mg triamcinolone acetonide 40 mg/mL; 0.5 mL lidocaine 10 mg/mL (1 %)  Outcome: tolerated well, no immediate complications  Procedure, treatment alternatives, risks and benefits explained, specific risks discussed. Consent was given by the patient. Immediately prior to procedure a time out was called to verify the correct patient, procedure, equipment, support staff and site/side marked as required. Patient was prepped and draped in the usual sterile fashion.       Medications Prescribed: tramadol 50 mg       Follow up: As needed             Jurgen Munguia MD  [facility]  Department of Orthopaedic Surgery  Hand and Upper Extremity Reconstruction    Scribe Attestation  By signing my name below, I, Cassi Huerta , Cabrera   attest that this documentation has been prepared under the direction and in the presence of Dr. Jurgen Munguia.    Dictation performed with the use of voice recognition software.  Syntax and grammatical errors may exist.

## 2025-07-21 NOTE — LETTER
July 21, 2025     Lise Jeff PA-C  [address] [facility] [place] [address]    Patient: Nereida Conway   YOB: 1965   Date of Visit: 7/21/2025       Dear Dr. Lise Jeff PA-C:    Thank you for referring Nereida Conway to me for evaluation. Below are my notes for this consultation.  If you have questions, please do not hesitate to call me. I look forward to following your patient along with you.       Sincerely,     Jurgen Munguia MD      CC: No Recipients  ______________________________________________________________________________________    [facility]  Hand and Upper Extremity Service  Follow up visit         Follow up for: Left hand     Interval History: She returns for her left hand. She has CMC arthritis that has been managed with intermittent injections and oral tramadol. She received a left thumb CMC injection at last visit and her symptoms have returned. She is here for a repeat injection. She has had surgical discussion and she did well following surgery to her right thumb several years ago but she remains unable to consider surgery at this time due to a variety of issues. She lost her mother recently and her father is recovering from a recent stroke. She also is recovering from left side TMJ reconstruction and is just starting her third week of therapy following that surgery. She lost her job but now has a new job she is about to start and she needs to save up before consideration of surgical reconstruction.               Past medical history, medications, allergies, surgical history and review of systems are reviewed and otherwise unchanged when compared to last visit on 3/3/25         Examination:  Constitutional: Oriented to person, place, and time.  Appears well-developed and well-nourished.  Head: Normocephalic and atraumatic.  Eyes: Pupils are equal, round, and reactive to light.  Cardiovascular: Intact distal  pulses.  Pulmonary/Chest/Breast: Effort normal. No respiratory distress.  Neurological: Alert and oriented to person, place, and time.  Skin: Skin is warm and dry.  Psychiatric: normal mood and affect.  Behavior is normal.  Musculoskeletal: Left hand reveals bony prominence at the base of the thumb. Positive thumb CMC grind test without MP joint hyperextension instability. No thenar atrophy. Subjectively normal sensation in all fingers.       Impression: Left thumb CMC joint arthritis       Plan: We gave her a left thumb CMC injection today. I have also sent a prescription refill for Tramadol 50 mg but I have advised her that I am not a chronic pain management doctor and this has turned into chronic pain because she has been unwilling to undergo surgery. Moving forward, she is going to need to her these medications prescribed to her by her primary care physician or consider a referral to pain management. I will be happy to give her more injections in the future or schedule her for left thumb CMC joint reconstruction when she feels ready.       In Office Procedures Performed: Left thumb CMC injection   S Inj/Asp: L thumb CMC on 7/21/2025 11:29 AM  Indications: pain  Details: 25 G needle, dorsal approach  Medications: 20 mg triamcinolone acetonide 40 mg/mL; 0.5 mL lidocaine 10 mg/mL (1 %)  Outcome: tolerated well, no immediate complications  Procedure, treatment alternatives, risks and benefits explained, specific risks discussed. Consent was given by the patient. Immediately prior to procedure a time out was called to verify the correct patient, procedure, equipment, support staff and site/side marked as required. Patient was prepped and draped in the usual sterile fashion.       Medications Prescribed: tramadol 50 mg       Follow up: As needed             Jurgen Munguia MD  [facility]  Department of Orthopaedic Surgery  Hand and Upper Extremity Reconstruction    Scrjose  Attestation  By signing my name below, I, Cabrera Kan   attest that this documentation has been prepared under the direction and in the presence of Dr. Jurgen Munguia.    Dictation performed with the use of voice recognition software.  Syntax and grammatical errors may exist.

## 2025-07-27 ASSESSMENT — PROMIS GLOBAL HEALTH SCALE
CARRYOUT_SOCIAL_ACTIVITIES: VERY GOOD
RATE_AVERAGE_FATIGUE: MODERATE
RATE_GENERAL_HEALTH: VERY GOOD
CARRYOUT_PHYSICAL_ACTIVITIES: COMPLETELY
RATE_PHYSICAL_HEALTH: VERY GOOD
RATE_QUALITY_OF_LIFE: EXCELLENT
RATE_MENTAL_HEALTH: GOOD
RATE_AVERAGE_PAIN: 5
RATE_SOCIAL_SATISFACTION: VERY GOOD
EMOTIONAL_PROBLEMS: OFTEN

## 2025-07-28 ENCOUNTER — APPOINTMENT (OUTPATIENT)
Dept: PRIMARY CARE | Facility: CLINIC | Age: 60
End: 2025-07-28
Payer: COMMERCIAL

## 2025-07-28 VITALS
BODY MASS INDEX: 25.32 KG/M2 | WEIGHT: 129 LBS | TEMPERATURE: 97.9 F | SYSTOLIC BLOOD PRESSURE: 100 MMHG | HEIGHT: 60 IN | DIASTOLIC BLOOD PRESSURE: 62 MMHG

## 2025-07-28 DIAGNOSIS — R09.82 POST-NASAL DRIP: ICD-10-CM

## 2025-07-28 DIAGNOSIS — F41.9 ANXIETY: ICD-10-CM

## 2025-07-28 DIAGNOSIS — Z00.00 ROUTINE ADULT HEALTH MAINTENANCE: Primary | ICD-10-CM

## 2025-07-28 DIAGNOSIS — R39.11 URINARY HESITANCY: ICD-10-CM

## 2025-07-28 LAB
ALBUMIN SERPL BCP-MCNC: 4.2 G/DL (ref 3.4–5)
ALP SERPL-CCNC: 112 U/L (ref 45–117)
ALT SERPL W P-5'-P-CCNC: 30 U/L (ref 16–63)
ANION GAP SERPL CALC-SCNC: 13 MMOL/L (ref 10–20)
APPEARANCE UR: CLEAR
AST SERPL W P-5'-P-CCNC: 18 U/L (ref 15–37)
BASOPHILS # BLD AUTO: 0.07 X10*3/UL (ref 0.1–1.6)
BASOPHILS NFR BLD AUTO: 0.62 % (ref 0–0.3)
BILIRUB SERPL-MCNC: 0.5 MG/DL (ref 0.2–1)
BILIRUB UR QL STRIP: NEGATIVE
BUN SERPL-MCNC: 14 MG/DL (ref 7–18)
CALCIUM SERPL-MCNC: 9.6 MG/DL (ref 8.5–10.1)
CHLORIDE SERPL-SCNC: 103 MMOL/L (ref 98–107)
CHOLEST SERPL-MCNC: 139 MG/DL (ref 0–199)
CHOLESTEROL/HDL RATIO: 2.7 (ref 4.2–7)
CO2 SERPL-SCNC: 28 MMOL/L (ref 21–32)
COLOR UR: YELLOW
CREAT SERPL-MCNC: 0.87 MG/DL (ref 0.6–1.1)
EGFRCR SERPLBLD CKD-EPI 2021: 76 ML/MIN/1.73M*2
EOSINOPHIL # BLD AUTO: 0.45 X10*3/UL (ref 0.04–0.5)
EOSINOPHIL NFR BLD AUTO: 4.29 % (ref 0.7–7)
ERYTHROCYTE [DISTWIDTH] IN BLOOD BY AUTOMATED COUNT: 13.3 % (ref 11.5–14.5)
GLUCOSE SERPL-MCNC: 86 MG/DL (ref 74–100)
GLUCOSE UR STRIP-MCNC: NEGATIVE MG/DL
HCT VFR BLD AUTO: 36.3 % (ref 36.6–46.6)
HDLC SERPL-MCNC: 51 MG/DL (ref 40–59)
HGB BLD-MCNC: 11.79 G/DL (ref 12–15.4)
HGB UR QL STRIP: NEGATIVE
IS PATIENT FASTING: YES
KETONES UR STRIP-MCNC: NEGATIVE MG/DL
LDLC SERPL DIRECT ASSAY-MCNC: 62 MG/DL (ref 0–100)
LEUKOCYTE ESTERASE UR QL STRIP: NEGATIVE
LYMPHOCYTES # BLD AUTO: 1.81 X10*3/UL (ref 0–6)
LYMPHOCYTES NFR BLD AUTO: 17.16 % (ref 20.5–51.1)
MCH RBC QN AUTO: 31.7 PG (ref 26–32)
MCHC RBC AUTO-ENTMCNC: 32.5 G/DL (ref 31–38)
MCV RBC AUTO: 97.7 FL (ref 80–96)
MONOCYTES # BLD AUTO: 0.86 X10*3/UL (ref 1.6–24.9)
MONOCYTES NFR BLD AUTO: 8.17 % (ref 1.7–9.3)
NEUTROPHILS # BLD AUTO: 7.37 X10*3/UL (ref 1.4–6.5)
NEUTROPHILS NFR BLD AUTO: 69.76 % (ref 42.2–75.2)
NITRITE UR QL STRIP: NEGATIVE
PH UR STRIP: 6 [PH]
PLATELET # BLD AUTO: 225.6 X10*3/UL (ref 150–450)
PMV BLD AUTO: 8.24 FL (ref 7.8–11)
POTASSIUM SERPL-SCNC: 4.7 MMOL/L (ref 3.5–5.1)
PROT SERPL-MCNC: 6 G/DL (ref 6.4–8.2)
PROT UR STRIP-MCNC: NEGATIVE MG/DL
RBC # BLD AUTO: 3.72 X10*6/UL (ref 3.9–5.3)
SODIUM SERPL-SCNC: 139 MMOL/L (ref 136–145)
SP GR UR STRIP.AUTO: 1.02
TRIGL SERPL-MCNC: 148 MG/DL
UROBILINOGEN UR STRIP-ACNC: 0.2 E.U./DL
WBC # BLD AUTO: 10.56 X10*3/UL (ref 4.5–10.5)

## 2025-07-28 PROCEDURE — 85025 COMPLETE CBC W/AUTO DIFF WBC: CPT | Performed by: PHYSICIAN ASSISTANT

## 2025-07-28 PROCEDURE — 80053 COMPREHEN METABOLIC PANEL: CPT | Performed by: PHYSICIAN ASSISTANT

## 2025-07-28 PROCEDURE — 3078F DIAST BP <80 MM HG: CPT | Performed by: PHYSICIAN ASSISTANT

## 2025-07-28 PROCEDURE — 81003 URINALYSIS AUTO W/O SCOPE: CPT | Performed by: PHYSICIAN ASSISTANT

## 2025-07-28 PROCEDURE — 3074F SYST BP LT 130 MM HG: CPT | Performed by: PHYSICIAN ASSISTANT

## 2025-07-28 PROCEDURE — 3008F BODY MASS INDEX DOCD: CPT | Performed by: PHYSICIAN ASSISTANT

## 2025-07-28 PROCEDURE — 99396 PREV VISIT EST AGE 40-64: CPT | Performed by: PHYSICIAN ASSISTANT

## 2025-07-28 RX ORDER — BUSPIRONE HYDROCHLORIDE 5 MG/1
5 TABLET ORAL 3 TIMES DAILY
Qty: 90 TABLET | Refills: 1 | Status: SHIPPED | OUTPATIENT
Start: 2025-07-28 | End: 2026-07-28

## 2025-07-28 RX ORDER — AZELASTINE 1 MG/ML
2 SPRAY, METERED NASAL DAILY
Qty: 30 ML | Refills: 3 | Status: SHIPPED | OUTPATIENT
Start: 2025-07-28 | End: 2026-07-28

## 2025-07-28 ASSESSMENT — ENCOUNTER SYMPTOMS
SHORTNESS OF BREATH: 0
NUMBNESS: 0
SLEEP DISTURBANCE: 0
POLYDIPSIA: 0
PALPITATIONS: 0
DIZZINESS: 0
CONSTIPATION: 0
FACIAL SWELLING: 0
CHILLS: 0
FREQUENCY: 0
ARTHRALGIAS: 0
FEVER: 0
TREMORS: 0
VOMITING: 0
CONFUSION: 0
SORE THROAT: 0
DIARRHEA: 0
ANAL BLEEDING: 0
EYE PAIN: 0
HEADACHES: 0
CHEST TIGHTNESS: 0
ABDOMINAL DISTENTION: 0
COUGH: 1
FATIGUE: 0
MYALGIAS: 0
WEAKNESS: 0
ABDOMINAL PAIN: 0
NERVOUS/ANXIOUS: 1
COLOR CHANGE: 0
JOINT SWELLING: 0
DIFFICULTY URINATING: 0
POLYPHAGIA: 0
WHEEZING: 0
HEMATURIA: 0
EYE DISCHARGE: 0
APPETITE CHANGE: 0
NAUSEA: 0
CHOKING: 0

## 2025-07-28 ASSESSMENT — PAIN SCALES - GENERAL: PAINLEVEL_OUTOF10: 4

## 2025-07-28 NOTE — PROGRESS NOTES
Subjective   Patient ID: Nereida Conway is a 60 y.o. 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    HPIThe patient is presented for physical exam.  She has been having anxiety and stress.  Stated that sometimes she can find words and has trouble concentrating.  She denies suicidal or homicidal ideations.  She started seeing psychology once a week a month ago.  Also complaining of cough.  Stated she cleans her throat throughout the day which is annoying.  She denies wheezing, shortness of breath or difficulty.  Additionally, she is complaining of urinary hesitancy which she has been having for almost a year. She denies frequency, urgency, or hematuria.  Her migraines are stable. Blood pressure is well-controlled.  The patient does not exercise and is not on any diet.    Review of Systems   Constitutional:  Negative for appetite change, chills, fatigue and fever.   HENT:  Negative for congestion, ear pain, facial swelling, hearing loss, nosebleeds and sore throat.    Eyes:  Negative for pain, discharge and visual disturbance.   Respiratory:  Positive for cough. Negative for choking, chest tightness, shortness of breath and wheezing.    Cardiovascular:  Negative for chest pain, palpitations and leg swelling.   Gastrointestinal:  Negative for abdominal distention, abdominal pain, anal bleeding, constipation, diarrhea, nausea and vomiting.   Endocrine: Negative for cold intolerance, heat intolerance, polydipsia, polyphagia and polyuria.   Genitourinary:  Negative for difficulty urinating, frequency, hematuria and urgency.        Urinary hesitancy   Musculoskeletal:  Negative for arthralgias, gait problem, joint swelling and myalgias.   Skin:  Negative for color change and rash.   Neurological:  Negative for dizziness,  tremors, syncope, weakness, numbness and headaches.   Psychiatric/Behavioral:  Negative for behavioral problems, confusion, sleep disturbance and suicidal ideas. The patient is nervous/anxious.        Objective   /62 (Patient Position: Sitting)   Temp 36.6 °C (97.9 °F) (Temporal)   Ht (!) 1.524 m (5')   Wt 58.5 kg (129 lb)   LMP  (LMP Unknown)   Breastfeeding No   BMI 25.19 kg/m²     Physical Exam  Constitutional:       General: She is not in acute distress.     Appearance: Normal appearance.   HENT:      Head: Normocephalic and atraumatic.      Nose: Nose normal.      Mouth/Throat:      Comments: Postnasal drainage noted    Eyes:      Extraocular Movements: Extraocular movements intact.      Conjunctiva/sclera: Conjunctivae normal.      Pupils: Pupils are equal, round, and reactive to light.       Cardiovascular:      Rate and Rhythm: Normal rate and regular rhythm.      Pulses: Normal pulses.      Heart sounds: Normal heart sounds.   Pulmonary:      Effort: Pulmonary effort is normal.      Breath sounds: Normal breath sounds.   Abdominal:      General: Bowel sounds are normal.      Palpations: Abdomen is soft.     Musculoskeletal:         General: Normal range of motion.      Cervical back: Normal range of motion and neck supple.     Neurological:      General: No focal deficit present.      Mental Status: She is alert and oriented to person, place, and time.     Psychiatric:         Mood and Affect: Mood normal.         Behavior: Behavior normal.         Thought Content: Thought content normal.         Judgment: Judgment normal.         Assessment/Plan   Complete physical exam.  We obtained fasting blood work including CBC, CMP, lipid panel.  Urinalysis  Exercise at least 30 minutes daily  Healthy diet recommended  Follow up with dentist twice a year for dental cleaning     Urinary hesitancy  Urinalysis is negative for UTI  Consult urology, referral is given    Depression and anxiety  Worsening    Start Buspirone 5 mg 1 tablet 3 times a day  May increase to 2 or 3 tablets if not effective  Continue Amitriptyline 50 mg at bed time  Take Citalopram 40 mg once daily  Continue psychotherapy once a week    Cough  Most likely due to postnasal drip  Start azelastine 2 sprays in each nostril at bedtime    Left TMJ  S/p TMJ replacement on 5/28/25  Stable  Continue PT  Follow up with oral surgery Dr. Delgado     HTN  Well-controlled  Continue lisinopril-HCTZ 10-12.5 mg daily  If low, will discontinue hydrochlorothiazide and continue lisinopril  No added salt diet, do not add salt to your food  Try to exercise every other day for 30 minutes    Arthralgia, osteoarthritis of multiple joints   Low back pain, left hip pain, right TMJ, right and left thumb CMC joint pains  s/p hip replacement   S/p right CMC joint arthroplasty   S/p TMJ replacement on 5/20/2025  Failed Neurontin, tramadol, Cymbalta, and indomethacin  Currently on tramadol 50 mg 3 times daily given by Dr. Munguia  Continue physical therapy  Follow-up with orthopedic surgery Dr. Munguia    IBS with diarrhea  Stable  Xifaxan as needed  Take probiotics daily  Colonoscopy February 2024, repeat in 7 years  Follow-up with gastroenterology     Peripheral neuropathy  Started on Gabapentin 100 mg hs   Follow up with neurology [place]     leg spasms   Improved  Off of magnesium 400 mg  Drink plenty of fluids     Former smoker  quit in 2015  4 mm pulmonary nodule right lung  Repeat CT chest 3/18/2023    Dyslipidemia  Continue Atorvastatin 80 mg at bed time  Continue with the low fat, low cholesterol diet  I recommend Mediterranean diet, which include fish, chicken, vegetables and olive oil  Exercise daily for 30 minutes at least 3 times a week     Migraines  Stable  Continue Amitriptyline 50 mg at bed time   Continue Propranolol 120 mg once daily  Maxalt as needed  Follow-up with neurology     Herpes simplex 2   Take Valacyclovir as needed for flare ups.     Abnormal  mammogram 7/5/2024  S/p Biopsy, benign  Mammogram 7/8/25 breast calcifications, repeat in a year   Follows with oncology    Vitamin D deficiency  Continue vitamin D 2000 units daily    Body mass index is 25.19 kg/m².  Patient has lost 13 pounds    Health maintenance  Mammogram 7/8/25 breast calcifications, repeat in a year   Colonoscopy 2024, repeat in 7 years    Follow-up in 4 weeks

## 2025-08-05 ENCOUNTER — TELEPHONE (OUTPATIENT)
Dept: DERMATOLOGY | Facility: CLINIC | Age: 60
End: 2025-08-05
Payer: COMMERCIAL

## 2025-08-05 NOTE — TELEPHONE ENCOUNTER
Pt left message that she wanted her medical records faxed to [phone]--dr matilde weiss office , I returned call to let pt know she needs to sign a release form , she can go to our  website or come into any one of our offices and sign the form ...

## 2025-08-08 DIAGNOSIS — A60.04 HERPES SIMPLEX VULVOVAGINITIS: ICD-10-CM

## 2025-08-08 RX ORDER — VALACYCLOVIR HYDROCHLORIDE 500 MG/1
500 TABLET, FILM COATED ORAL DAILY
Qty: 30 TABLET | Refills: 0 | Status: SHIPPED | OUTPATIENT
Start: 2025-08-08

## 2025-08-11 DIAGNOSIS — F41.9 ANXIETY: Primary | ICD-10-CM

## 2025-08-11 RX ORDER — HYDROXYZINE PAMOATE 25 MG/1
25 CAPSULE ORAL 2 TIMES DAILY
Qty: 60 CAPSULE | Refills: 0 | Status: SHIPPED | OUTPATIENT
Start: 2025-08-11 | End: 2025-09-10

## 2025-08-17 DIAGNOSIS — G43.009 MIGRAINE WITHOUT AURA AND WITHOUT STATUS MIGRAINOSUS, NOT INTRACTABLE: ICD-10-CM

## 2025-08-18 RX ORDER — TOPIRAMATE 25 MG/1
TABLET, FILM COATED ORAL
Qty: 90 TABLET | Refills: 0 | Status: SHIPPED | OUTPATIENT
Start: 2025-08-18

## 2025-08-28 ENCOUNTER — APPOINTMENT (OUTPATIENT)
Dept: PRIMARY CARE | Facility: CLINIC | Age: 60
End: 2025-08-28
Payer: COMMERCIAL

## 2025-09-03 ENCOUNTER — OFFICE VISIT (OUTPATIENT)
Dept: UROLOGY | Facility: HOSPITAL | Age: 60
End: 2025-09-03
Payer: COMMERCIAL

## 2025-09-03 DIAGNOSIS — R39.9 LOWER URINARY TRACT SYMPTOMS (LUTS): Primary | ICD-10-CM

## 2025-09-03 PROCEDURE — 99204 OFFICE O/P NEW MOD 45 MIN: CPT | Performed by: STUDENT IN AN ORGANIZED HEALTH CARE EDUCATION/TRAINING PROGRAM

## 2025-09-03 PROCEDURE — 99212 OFFICE O/P EST SF 10 MIN: CPT

## 2025-09-03 RX ORDER — DOXAZOSIN 2 MG/1
2 TABLET ORAL NIGHTLY
Qty: 30 TABLET | Refills: 11 | Status: SHIPPED | OUTPATIENT
Start: 2025-09-03 | End: 2026-09-03

## 2025-09-05 DIAGNOSIS — A60.04 HERPES SIMPLEX VULVOVAGINITIS: ICD-10-CM

## 2025-09-05 DIAGNOSIS — F41.9 ANXIETY: Primary | ICD-10-CM

## 2025-09-05 RX ORDER — VALACYCLOVIR HYDROCHLORIDE 500 MG/1
500 TABLET, FILM COATED ORAL DAILY
Qty: 30 TABLET | Refills: 3 | Status: SHIPPED | OUTPATIENT
Start: 2025-09-05

## 2025-09-09 ENCOUNTER — APPOINTMENT (OUTPATIENT)
Dept: PAIN MEDICINE | Facility: CLINIC | Age: 60
End: 2025-09-09
Payer: COMMERCIAL

## 2025-09-10 ENCOUNTER — APPOINTMENT (OUTPATIENT)
Dept: RADIOLOGY | Facility: HOSPITAL | Age: 60
End: 2025-09-10
Payer: COMMERCIAL

## 2025-09-22 ENCOUNTER — APPOINTMENT (OUTPATIENT)
Dept: NEUROLOGY | Facility: CLINIC | Age: 60
End: 2025-09-22
Payer: COMMERCIAL

## 2025-09-29 ENCOUNTER — APPOINTMENT (OUTPATIENT)
Dept: OPHTHALMOLOGY | Facility: CLINIC | Age: 60
End: 2025-09-29
Payer: COMMERCIAL

## 2025-09-30 ENCOUNTER — APPOINTMENT (OUTPATIENT)
Dept: OPHTHALMOLOGY | Facility: CLINIC | Age: 60
End: 2025-09-30
Payer: COMMERCIAL

## 2025-10-02 ENCOUNTER — APPOINTMENT (OUTPATIENT)
Dept: PAIN MEDICINE | Facility: CLINIC | Age: 60
End: 2025-10-02
Payer: COMMERCIAL

## 2025-10-22 ENCOUNTER — APPOINTMENT (OUTPATIENT)
Dept: UROLOGY | Facility: HOSPITAL | Age: 60
End: 2025-10-22
Payer: COMMERCIAL

## 2025-10-28 ENCOUNTER — APPOINTMENT (OUTPATIENT)
Dept: PRIMARY CARE | Facility: CLINIC | Age: 60
End: 2025-10-28
Payer: COMMERCIAL

## 2026-01-30 ENCOUNTER — APPOINTMENT (OUTPATIENT)
Dept: DERMATOLOGY | Facility: CLINIC | Age: 61
End: 2026-01-30
Payer: COMMERCIAL

## (undated) DEVICE — SUTURE, SILK, 2-0, 18 IN, BLACK

## (undated) DEVICE — KNIFE, OPHTHALMIC, SLIT, EDGEAHEAD, 40 DEG BEVEL UP, 3.2 MM

## (undated) DEVICE — PREP TRAY, VAGINAL

## (undated) DEVICE — PENCIL, BIPOLAR, STORZ, 18G FINE, STRT, KNURLED HANDLE

## (undated) DEVICE — APPLICATOR, COTTON TIP, 6 IN, 2PK, STERILE

## (undated) DEVICE — APPLIER,  LIGACLIP MULTI CLIP, 30 MED 11 1/2

## (undated) DEVICE — Device

## (undated) DEVICE — TOWEL, SURGICAL, NEURO, O/R, 16 X 26, BLUE, STERILE

## (undated) DEVICE — SYRINGE, 10CC, CONTROL, STERILE

## (undated) DEVICE — POLISHER, CAPSULE, KRATZ, 23GA, .60MM,  BLUE

## (undated) DEVICE — SPONGE, LAP, XRAY DECT, 4IN X 18IN, W/MASTER DMT, STERILE

## (undated) DEVICE — CORD, CAUTERY, BIOPOLAR FORCEP, 12FT

## (undated) DEVICE — DRESSING, GAUZE, FLUFF, 1 PLY, 18 X 36 IN

## (undated) DEVICE — SYRINGE, I ML, TUBERCULIN, 27G X 1/2

## (undated) DEVICE — WOUND SYSTEM, DEBRIDEMENT & CLEANING, O.R DUOPAK

## (undated) DEVICE — SEALER, BIPOLAR, AQUA MANTYS MIS FLEX MINI

## (undated) DEVICE — DRAPE, SHEET, THREE QUARTER, FAN FOLD, 57 X 77 IN

## (undated) DEVICE — DRIVER, HI-TORQUE, Z-DRIVER

## (undated) DEVICE — KNIFE, IQ APEX, 11CM

## (undated) DEVICE — SUTURE, MONOCRYL, 4-0, 18 IN, PS2, UNDYED

## (undated) DEVICE — SHIELD, EYE, UNIVERSAL, CLEAR

## (undated) DEVICE — PHACO PACK, PREMIUM, 1.8 MM ANGLED

## (undated) DEVICE — STRIP, SKIN CLOSURE, STERI STRIP, REINFORCED, 0.5 X 4 IN

## (undated) DEVICE — I/A TIP, 35 DEGREE, ANGLED BENT

## (undated) DEVICE — SUCTION, VERSALIGHT MINI W/EXTENDED FRAZIER TIP

## (undated) DEVICE — SHIELD, COLLAGEN CORNEAL, SOFT, 12HR

## (undated) DEVICE — CANNULA, HYDRODISSECTION, NUCLEUS, 27 GA X 0.87 IN, GRAY, STERILE

## (undated) DEVICE — CYSTOTOME, IRRIGATING, FORMED ANGLED TIP, 27 GA

## (undated) DEVICE — SUTURE, SILK, 2-0, 30 IN, SH, BLACK

## (undated) DEVICE — SYRINGE, 3CC, W/NEEDLE, SAFETY, 22 G X 1.5 IN

## (undated) DEVICE — COVER, TABLE, 44 X 75 IN, DISPOSABLE, LF, STERILE

## (undated) DEVICE — SUTURE, SILK, 3-0, 30 IN, RB-1, BLACK

## (undated) DEVICE — PAD, EYE, OVAL, 1 5/8 X 2 5/8 IN, STERILE

## (undated) DEVICE — STIMULATOR, NERVE LOCATOR, HEAD&NECK

## (undated) DEVICE — COUNTER, NEEDLE, FOAM BLOCK, POP-N-COUNT, W/BLADEGUARD, W/ADHESIVE 40 COUNT, RED

## (undated) DEVICE — DRAPE, MAGENTIC INSTRUMENT, 12X16

## (undated) DEVICE — TRAY, MINOR, SINGLE BASIN, STERILE

## (undated) DEVICE — DRAIN, PENROSE, 1 IN X 18 IN, STERILE, LF

## (undated) DEVICE — STAY SET, SURGICAL, 5MM SHARP HOOK, 8PK

## (undated) DEVICE — DRAPE, INSTRUMENT, W/POUCH, STERI DRAPE, 7 X 11 IN, DISPOSABLE, STERILE

## (undated) DEVICE — FLOSEAL, MATRIX, HEMOSTATIC, FULL STERILE PREP, 5ML

## (undated) DEVICE — GLOVE, SURGICAL, PROTEXIS PI MICRO, 7.5, PF, LF

## (undated) DEVICE — SUTURE, VICRYL, 4-0, 70CM, TAPER SH

## (undated) DEVICE — SUTURE, VICRYL PLUS 3-0, SH, 27IN

## (undated) DEVICE — GOLF PENCIL, LF, STERILE

## (undated) DEVICE — BAG, PRESSURE INFUSER, 3000 CC, LF

## (undated) DEVICE — TUBING, PHACO, CENTURION ACTIVE, 0.9 MICRO SMOOTH, 30 BALANCED

## (undated) DEVICE — DRAPE, SHEET, UTILITY, NON ABSORBENT, 18 X 26 IN, LF

## (undated) DEVICE — DRESSING, TRANSPARENT, TEGADERM, 2-3/8 X 2-3/4 IN

## (undated) DEVICE — DRESSING, NON-ADHERENT, TELFA, OUCHLESS, 3 X 8 IN, STERILE

## (undated) DEVICE — DRESSING, GAUZE, PETROLATUM, PATCH, XEROFORM, 1 X 8 IN, STERILE

## (undated) DEVICE — STAPLER, SKIN PROXIMATE, 35 WIDE

## (undated) DEVICE — CASSETTE, SONOPET IQ IRRIGATION SUCTION

## (undated) DEVICE — CONTAINER, SPECIMEN, 120 ML, STERILE

## (undated) DEVICE — DRAPE, INCISE, ANTIMICROBIAL, IOBAN 2, LARGE, 17 X 23 IN, DISPOSABLE, STERILE

## (undated) DEVICE — ELECTRODE, ELECTROSURGICAL, GUARDED TIP

## (undated) DEVICE — CANNULA, NASAL, OVER-THE-EAR, NONFLARED TIP, ADULT, 7 FT, LF

## (undated) DEVICE — SPONGE GAUZE, XRAY SC+RFID, 4X4 16 PLY, STERILE

## (undated) DEVICE — DRAPE, TOWEL, STERI DRAPE, 17 X 11 IN, PLASTIC, STERILE

## (undated) DEVICE — SUTURE, PLAIN, 5-0, 18 IN, PC1, YELLOW

## (undated) DEVICE — SPONGE, DISSECTOR, PEANUT, 3/8, STERILE 5 FOAM HOLDER"

## (undated) DEVICE — SPEAR, EYE

## (undated) DEVICE — ADHESIVE, SKIN, MASTISOL, 2/3 CC VIAL